# Patient Record
Sex: FEMALE | Race: ASIAN | Employment: OTHER | URBAN - METROPOLITAN AREA
[De-identification: names, ages, dates, MRNs, and addresses within clinical notes are randomized per-mention and may not be internally consistent; named-entity substitution may affect disease eponyms.]

---

## 2018-06-07 ENCOUNTER — TRANSFERRED RECORDS (OUTPATIENT)
Dept: HEALTH INFORMATION MANAGEMENT | Facility: CLINIC | Age: 78
End: 2018-06-07

## 2018-07-17 ENCOUNTER — TRANSFERRED RECORDS (OUTPATIENT)
Dept: HEALTH INFORMATION MANAGEMENT | Facility: CLINIC | Age: 78
End: 2018-07-17

## 2018-08-16 NOTE — PROGRESS NOTES
SUBJECTIVE:   Alena Coronel is a 77 year old female who presents to clinic today for the following health issues:    Patient presents with:  Abdominal Pain: x6 months   Bowel Problems  Health Maintenance: DUE: GAD7, PHQ9, MICROALBUMIN, BMP, FALL RISK, LIPID, DEXA , ADP           Problem list and histories reviewed & adjusted, as indicated.  Additional history: as documented    Patient Active Problem List   Diagnosis     Generalized osteoarthrosis, unspecified site     Irritable bowel syndrome     Adjustment disorder with mixed anxiety and depressed mood     Insomnia     Eczema     Hypertension goal BP (blood pressure) < 140/90     Hyperlipidemia LDL goal <130     Chronic neck pain     Hypokalemia     Osteoporosis     Chest pain     Idiopathic thrombocytopenia (H)     RUQ abdominal pain     Immune to hepatitis A     Dysphagia     GERD (gastroesophageal reflux disease)     Advanced directives, counseling/discussion     Macular drusen     PVD (posterior vitreous detachment)     Venous stasis     Anxiety     Cataract     TMJ (temporomandibular joint syndrome)     Past Surgical History:   Procedure Laterality Date     BIOPSY OF BREAST, INCISIONAL  1985     C APPENDECTOMY  1975     C LIGATE FALLOPIAN TUBE  1975     COLONOSCOPY  11/14/2008       Social History   Substance Use Topics     Smoking status: Never Smoker     Smokeless tobacco: Never Used      Comment: smoke free household.     Alcohol use No      Comment: 1/3 cup of red wine before bedtime     Family History   Problem Relation Age of Onset     GASTROINTESTINAL DISEASE Father      Stomach/Liver Problems     Neurologic Disorder Sister      SANCHEZ     Glaucoma No family hx of      Macular Degeneration No family hx of          Current Outpatient Prescriptions   Medication Sig Dispense Refill     Alendronate Sodium 70 MG TBEF Take by mouth every 7 days       ASMANEX  MCG/ACT inhaler 200 mcg  12     ATORVASTATIN CALCIUM PO Take 20 mg by mouth every morning        Biotin 2500 MCG CAPS Take 5,000 mcg/day by mouth       Calcium Carbonate-Vit D-Min (CALCIUM 1200 PO) Take 1,200 mg by mouth. 1 tab daily       Cholecalciferol (VITAMIN D) 1000 UNITS capsule Take 1 capsule by mouth daily.       CVS OMEGA-3 KRILL  MG CAPS Take  by mouth.       Flaxseed, Linseed, POWD Take 1-2 tsp by mouth daily.       fluocinonide (LIDEX) 0.05 % ointment Apply topically 2 times daily       TRAZODONE HCL PO Take 100 mg by mouth At Bedtime       [DISCONTINUED] OMEGA-3 KRILL OIL PO Take 1 capsule by mouth       Allergies   Allergen Reactions     Penicillins Rash     BP Readings from Last 3 Encounters:   08/21/18 120/70   01/09/14 129/74   08/22/13 132/62    Wt Readings from Last 3 Encounters:   08/21/18 133 lb 9.6 oz (60.6 kg)   01/09/14 136 lb 8 oz (61.9 kg)   08/22/13 136 lb (61.7 kg)                  Labs reviewed in EPIC    Reviewed and updated as needed this visit by clinical staff  Tobacco  Allergies  Meds  Med Hx  Surg Hx  Fam Hx  Soc Hx      Reviewed and updated as needed this visit by Provider         ROS:  This 77 year old female is here today because she has abdomen pain and bloating after eating. She has had 6 pregnancies. Has 5 living adult children. She had colonoscopy 6/7/18 that revealed on adenomatous polyp. Will need another colonoscopy in 5 years. She had esophogram done 7/17/18 which revealed GERD to the level of the mid thoracic esophagus. She was advised to use Maalox, which does help a little. But most of her discomfort is in her abdomen. She finds that there are many foods she can't tolerate eating anymore, such as fried foods.  They just don't sit well in her stomach. All other review of systems are negative  Personal, family, and social history reviewed with patient and revised.         OBJECTIVE:     /70  Pulse 58  Temp 97.6  F (36.4  C) (Oral)  Resp 20  Wt 133 lb 9.6 oz (60.6 kg)  SpO2 99%  BMI 28.91 kg/m2  Body mass index is 28.91  kg/(m^2).  GENERAL: healthy, alert and no distress  NECK: no adenopathy, no asymmetry, masses, or scars and thyroid normal to palpation  RESP: lungs clear to auscultation - no rales, rhonchi or wheezes  CV: regular rate and rhythm, normal S1 S2, no S3 or S4, no murmur, click or rub, no peripheral edema and peripheral pulses strong  ABDOMEN: soft, no hepatosplenomegaly, no masses and bowel sounds normal, she is diffusely tender over her RUQ and her mid abdomen area. Makes me wonder about possible gallstones.   MS: no gross musculoskeletal defects noted, no edema    Diagnostic Test Results:  none     ASSESSMENT/PLAN:              1. Abdominal pain, generalized  As above   - US Abdomen Complete; Future    2. Flatulence, eructation, and gas pain  As above   - US Abdomen Complete; Future    Return to clinic if no improvement     PERLA ASH MD  [facility]

## 2018-08-21 ENCOUNTER — OFFICE VISIT (OUTPATIENT)
Dept: FAMILY MEDICINE | Facility: CLINIC | Age: 78
End: 2018-08-21
Payer: COMMERCIAL

## 2018-08-21 VITALS
WEIGHT: 133.6 LBS | RESPIRATION RATE: 20 BRPM | SYSTOLIC BLOOD PRESSURE: 120 MMHG | BODY MASS INDEX: 28.91 KG/M2 | HEART RATE: 58 BPM | OXYGEN SATURATION: 99 % | DIASTOLIC BLOOD PRESSURE: 70 MMHG | TEMPERATURE: 97.6 F

## 2018-08-21 DIAGNOSIS — R14.2 FLATULENCE, ERUCTATION, AND GAS PAIN: ICD-10-CM

## 2018-08-21 DIAGNOSIS — R14.1 FLATULENCE, ERUCTATION, AND GAS PAIN: ICD-10-CM

## 2018-08-21 DIAGNOSIS — R10.84 ABDOMINAL PAIN, GENERALIZED: Primary | ICD-10-CM

## 2018-08-21 DIAGNOSIS — R14.3 FLATULENCE, ERUCTATION, AND GAS PAIN: ICD-10-CM

## 2018-08-21 PROCEDURE — 99213 OFFICE O/P EST LOW 20 MIN: CPT | Performed by: FAMILY MEDICINE

## 2018-08-21 RX ORDER — MOMETASONE FUROATE 200 UG/1
200 AEROSOL RESPIRATORY (INHALATION)
Refills: 12 | COMMUNITY
Start: 2018-01-18 | End: 2018-11-28

## 2018-08-21 NOTE — PATIENT INSTRUCTIONS
[facility]    If you have any questions regarding to your visit please contact your care team:       Team Purple:   Clinic Hours Telephone Number   Dr. Kristen Hilliard   7am-7pm  Monday - Thursday   7am-5pm  Fridays  [phone]  (Appointment scheduling available 24/7)    Questions about your recent visit?   Team Line:  [phone]   Urgent Care - [place] and [facility] - 11am-9pm Monday-Friday Saturday-Sunday- 9am-5pm   [place] - 5pm-9pm Monday-Friday Saturday-Sunday- 9am-5pm  [phone] - [place]  [phone] [facility]       What options do I have for a visit other than an office visit? We offer electronic visits (e-visits) and telephone visits, when medically appropriate.  Please check with your medical insurance to see if these types of visits are covered, as you will be responsible for any charges that are not paid by your insurance.      You can use [employer] (secure electronic communication) to access to your chart, send your primary care provider a message, or make an appointment. Ask a team member how to get started.     For a price quote for your services, please call our Consumer Price Line at [phone] or our Imaging Cost estimation line at [phone] (for imaging tests).

## 2018-08-21 NOTE — MR AVS SNAPSHOT
After Visit Summary   8/21/2018    Alena Coronel    MRN: [id number]           Patient Information     Date Of Birth          1940        Visit Information        Provider Department      8/21/2018 12:00 PM Kristen Monzon MD [facility]        Today's Diagnoses     Abdominal pain, generalized    -  1    Flatulence, eructation, and gas pain          Care Instructions    [facility]    If you have any questions regarding to your visit please contact your care team:       Team Purple:   Clinic Hours Telephone Number   Dr. Kristen Hilliard   7am-7pm  Monday - Thursday   7am-5pm  Fridays  [phone]  (Appointment scheduling available 24/7)    Questions about your recent visit?   Team Line:  [phone]   Urgent Care - [place] and [facility] - 11am-9pm Monday-Friday Saturday-Sunday- 9am-5pm   [place] - 5pm-9pm Monday-Friday Saturday-Sunday- 9am-5pm  [phone] - [place]  [phone] - [place]       What options do I have for a visit other than an office visit? We offer electronic visits (e-visits) and telephone visits, when medically appropriate.  Please check with your medical insurance to see if these types of visits are covered, as you will be responsible for any charges that are not paid by your insurance.      You can use [email or web address] (secure electronic communication) to access to your chart, send your primary care provider a message, or make an appointment. Ask a team member how to get started.     For a price quote for your services, please call our Consumer Price Line at [phone] or our Imaging Cost estimation line at [phone] (for imaging tests).              Follow-ups after your visit        Your next 10 appointments already scheduled     Sep 20, 2018  8:20 AM CDT   (Arrive by 8:05 AM)   New Patient Visit with Ling Luna MD   [facility] ([facility]    [address]  [address] [place] [address] [phone]              Future tests that were ordered for you today     Open Future Orders        Priority Expected Expires Ordered    US Abdomen Complete Routine  8/21/2019 8/21/2018            Who to contact     If you have questions or need follow up information about today's clinic visit or your schedule please contact [facility] directly at [phone].  Normal or non-critical lab and imaging results will be communicated to you by QuadWranglehart, letter or phone within 4 business days after the clinic has received the results. If you do not hear from us within 7 days, please contact the clinic through MPSTOR or phone. If you have a critical or abnormal lab result, we will notify you by phone as soon as possible.  Submit refill requests through MPSTOR or call your pharmacy and they will forward the refill request to us. Please allow 3 business days for your refill to be completed.          Additional Information About Your Visit        [employer] Information     MPSTOR gives you secure access to your electronic health record. If you see a primary care provider, you can also send messages to your care team and make appointments. If you have questions, please call your primary care clinic.  If you do not have a primary care provider, please call [phone] and they will assist you.        Care EveryWhere ID     This is your Care EveryWhere ID. This could be used by other organizations to access your [facility] medical records  [id number]        Your Vitals Were     Pulse Temperature Respirations Pulse Oximetry BMI (Body Mass Index)       58 97.6  F (36.4  C) (Oral) 20 99% 28.91 kg/m2        Blood Pressure from Last 3 Encounters:   08/21/18 120/70   01/09/14 129/74   08/22/13 132/62    Weight from Last 3 Encounters:   08/21/18 133 lb 9.6 oz (60.6 kg)   01/09/14 136 lb 8 oz (61.9 kg)   08/22/13 136 lb (61.7 kg)                Primary Care Provider Office Phone # Fax #    Kristen Monzon [phone] [address] [address]        Equal Access to Services     ALISTAIR DURAN : Edwina tish stewart ghulamo Sojulyali, waaxda luqadaha, qaybta kaalmada adeegyada, francis carpio laCassiechava huynh. So [facility] [phone].    ATENCIÓN: Si habla español, tiene a robles disposición servicios gratuitos de asistencia lingüística. Llame al [phone].    We comply with applicable federal civil rights laws and [place] laws. We do not discriminate on the basis of race, color, national origin, age, disability, sex, sexual orientation, or gender identity.            Thank you!     Thank you for choosing [facility]  for your care. Our goal is always to provide you with excellent care. Hearing back from our patients is one way we can continue to improve our services. Please take a few minutes to complete the written survey that you may receive in the mail after your visit with us. Thank you!             Your Updated Medication List - Protect others around you: Learn how to safely use, store and throw away your medicines at www.disposemymeds.org.          This list is accurate as of 8/21/18 12:20 PM.  Always use your most recent med list.                   Brand Name Dispense Instructions for use Diagnosis    Alendronate Sodium 70 MG Tbef      Take by mouth every 7 days        ASMANEX  MCG/ACT inhaler   Generic drug:  mometasone furoate      200 mcg        ATORVASTATIN CALCIUM PO      Take 20 mg by mouth every morning        Biotin 2500 MCG Caps      Take 5,000 mcg/day by mouth        CALCIUM 1200 PO      Take 1,200 mg by mouth. 1 tab daily        CVS OMEGA-3 KRILL  MG Caps      Take  by mouth.        Flaxseed (Linseed) Powd      Take 1-2 tsp by mouth daily.        LIDEX 0.05 % ointment   Generic drug:  fluocinonide      Apply topically 2 times daily        TRAZODONE HCL PO      Take  100 mg by mouth At Bedtime        vitamin D 1000 units capsule      Take 1 capsule by mouth daily.

## 2018-08-22 ENCOUNTER — RADIANT APPOINTMENT (OUTPATIENT)
Dept: ULTRASOUND IMAGING | Facility: CLINIC | Age: 78
End: 2018-08-22
Attending: FAMILY MEDICINE
Payer: COMMERCIAL

## 2018-08-22 DIAGNOSIS — R14.2 FLATULENCE, ERUCTATION, AND GAS PAIN: ICD-10-CM

## 2018-08-22 DIAGNOSIS — R14.3 FLATULENCE, ERUCTATION, AND GAS PAIN: ICD-10-CM

## 2018-08-22 DIAGNOSIS — R14.1 FLATULENCE, ERUCTATION, AND GAS PAIN: ICD-10-CM

## 2018-08-22 DIAGNOSIS — R10.84 ABDOMINAL PAIN, GENERALIZED: ICD-10-CM

## 2018-08-22 PROCEDURE — 76700 US EXAM ABDOM COMPLETE: CPT

## 2018-08-23 NOTE — PROGRESS NOTES
Results for orders placed or performed in visit on 08/22/18   US Abdomen Complete    Narrative    ULTRASOUND ABDOMEN COMPLETE  8/22/2018 8:00 AM    HISTORY: Abdominal pain.    COMPARISON: Abdominal ultrasound performed 5/1/2013.    FINDINGS: The liver is unremarkable, without evidence for hepatic mass  or fatty infiltration. The gallbladder is unremarkable, without  evidence of cholelithiasis. Patient is nontender over the gallbladder.  There is mild intra- and extrahepatic biliary dilatation. Common duct  measures up to 1.1 cm in diameter. The pancreatic duct appears mildly  dilated, measuring up to 0.5 cm in diameter where seen. Pancreas is  partially obscured by overlying bowel gas, but appears otherwise  unremarkable where seen. Unremarkable spleen. Small simple cyst in the  upper pole of the right kidney measures 1.6 cm. Both kidneys are  otherwise unremarkable. No hydronephrosis. Abdominal aorta and IVC are  segmentally seen, and are of normal caliber where visualized.      Impression    IMPRESSION:   1. There is mild intra- and extrahepatic biliary dilatation, with no  definite cause identified. Consider CT or MRI for further  characterization.  2. The pancreatic duct also appears mildly dilated at 0.5 cm.   3. Biliary and pancreatic ductal dilatation had a similar appearance  on the prior ultrasound of 5/1/2013.     YULISSA RUIZ MD    will order cat scan of abdomen and pelvis.     PERLA ASH M.D.

## 2018-08-30 ENCOUNTER — RADIANT APPOINTMENT (OUTPATIENT)
Dept: CT IMAGING | Facility: CLINIC | Age: 78
End: 2018-08-30
Attending: FAMILY MEDICINE
Payer: COMMERCIAL

## 2018-08-30 DIAGNOSIS — R14.1 FLATULENCE, ERUCTATION, AND GAS PAIN: ICD-10-CM

## 2018-08-30 DIAGNOSIS — R14.3 FLATULENCE, ERUCTATION, AND GAS PAIN: ICD-10-CM

## 2018-08-30 DIAGNOSIS — R14.2 FLATULENCE, ERUCTATION, AND GAS PAIN: ICD-10-CM

## 2018-08-30 DIAGNOSIS — R10.84 ABDOMINAL PAIN, GENERALIZED: ICD-10-CM

## 2018-08-30 LAB
CREAT BLD-MCNC: 1 MG/DL (ref 0.5–1.2)
GFR SERPL CREATININE-BSD FRML MDRD: 55 ML/MIN/{1.73_M2}
GFRB: 54

## 2018-08-30 PROCEDURE — 74177 CT ABD & PELVIS W/CONTRAST: CPT | Mod: TC

## 2018-08-30 PROCEDURE — 82565 ASSAY OF CREATININE: CPT

## 2018-08-30 RX ORDER — IOPAMIDOL 755 MG/ML
500 INJECTION, SOLUTION INTRAVASCULAR ONCE
Status: COMPLETED | OUTPATIENT
Start: 2018-08-30 | End: 2018-08-30

## 2018-08-30 RX ADMIN — IOPAMIDOL 65 ML: 755 INJECTION, SOLUTION INTRAVASCULAR at 08:08

## 2018-08-30 RX ADMIN — Medication 66 ML: at 08:08

## 2018-09-20 ENCOUNTER — OFFICE VISIT (OUTPATIENT)
Dept: FAMILY MEDICINE | Facility: CLINIC | Age: 78
End: 2018-09-20
Payer: COMMERCIAL

## 2018-09-20 VITALS
TEMPERATURE: 97.5 F | HEART RATE: 58 BPM | OXYGEN SATURATION: 98 % | SYSTOLIC BLOOD PRESSURE: 126 MMHG | WEIGHT: 132.2 LBS | HEIGHT: 58 IN | RESPIRATION RATE: 18 BRPM | DIASTOLIC BLOOD PRESSURE: 70 MMHG | BODY MASS INDEX: 27.75 KG/M2

## 2018-09-20 DIAGNOSIS — L65.9 HAIR LOSS: ICD-10-CM

## 2018-09-20 DIAGNOSIS — I10 HYPERTENSION GOAL BP (BLOOD PRESSURE) < 140/90: Primary | ICD-10-CM

## 2018-09-20 DIAGNOSIS — Z78.0 ASYMPTOMATIC POSTMENOPAUSAL STATUS: ICD-10-CM

## 2018-09-20 DIAGNOSIS — E78.5 HYPERLIPIDEMIA LDL GOAL <130: ICD-10-CM

## 2018-09-20 PROCEDURE — 99214 OFFICE O/P EST MOD 30 MIN: CPT | Performed by: FAMILY MEDICINE

## 2018-09-20 RX ORDER — ASHW RT/MAG BRK/EPMD/THEAN/PHO 200-150 MG
1400 TABLET ORAL DAILY
COMMUNITY
End: 2019-09-19

## 2018-09-20 RX ORDER — AMPICILLIN TRIHYDRATE 250 MG
200 CAPSULE ORAL DAILY
COMMUNITY
End: 2019-09-19

## 2018-09-20 ASSESSMENT — PAIN SCALES - GENERAL: PAINLEVEL: MODERATE PAIN (5)

## 2018-09-20 ASSESSMENT — ANXIETY QUESTIONNAIRES
3. WORRYING TOO MUCH ABOUT DIFFERENT THINGS: SEVERAL DAYS
5. BEING SO RESTLESS THAT IT IS HARD TO SIT STILL: NOT AT ALL
1. FEELING NERVOUS, ANXIOUS, OR ON EDGE: SEVERAL DAYS
7. FEELING AFRAID AS IF SOMETHING AWFUL MIGHT HAPPEN: SEVERAL DAYS
6. BECOMING EASILY ANNOYED OR IRRITABLE: SEVERAL DAYS
2. NOT BEING ABLE TO STOP OR CONTROL WORRYING: SEVERAL DAYS
GAD7 TOTAL SCORE: 5
IF YOU CHECKED OFF ANY PROBLEMS ON THIS QUESTIONNAIRE, HOW DIFFICULT HAVE THESE PROBLEMS MADE IT FOR YOU TO DO YOUR WORK, TAKE CARE OF THINGS AT HOME, OR GET ALONG WITH OTHER PEOPLE: SOMEWHAT DIFFICULT

## 2018-09-20 ASSESSMENT — PATIENT HEALTH QUESTIONNAIRE - PHQ9: 5. POOR APPETITE OR OVEREATING: NOT AT ALL

## 2018-09-20 NOTE — MR AVS SNAPSHOT
After Visit Summary   9/20/2018    Alena Coronel    MRN: [id number]           Patient Information     Date Of Birth          1940        Visit Information        Provider Department      9/20/2018 1:45 PM Kristen Monzon MD [facility]        Today's Diagnoses     Hypertension goal BP (blood pressure) < 140/90    -  1    Hyperlipidemia LDL goal <130        Asymptomatic postmenopausal status        Hair loss          Care Instructions    [facility]    If you have any questions regarding to your visit please contact your care team:       Team Purple:   Clinic Hours Telephone Number   Dr. Kristen Hilliard   7am-7pm  Monday - Thursday   7am-5pm  Fridays  [phone]  (Appointment scheduling available 24/7)   Urgent Care - [place] - 11am-9pm Monday-Friday Saturday-Sunday- 9am-5pm   [place] - 5pm-9pm Monday-Friday Saturday-Sunday- 9am-5pm  [phone] - [place]  [phone] - [place]       What options do I have for a visit other than an office visit? We offer electronic visits (e-visits) and telephone visits, when medically appropriate.  Please check with your medical insurance to see if these types of visits are covered, as you will be responsible for any charges that are not paid by your insurance.      You can use Iddiction (secure electronic communication) to access to your chart, send your primary care provider a message, or make an appointment. Ask a team member how to get started.     For a price quote for your services, please call our Consumer Price Line at [phone] or our Imaging Cost estimation line at [phone] (for imaging tests).    [facility]    If you have any questions regarding to your visit please contact your care team:       Team Purple:   Clinic Hours Telephone Number   Dr. Kristen Hilliard   7am-7pm   Monday - Thursday   7am-5pm  Fridays  [phone]  (Appointment scheduling available 24/7)   Urgent Care - [place] and [place] - 11am-9pm Monday-Friday Saturday-Sunday- 9am-5pm   [place] - 5pm-9pm Monday-Friday Saturday-Sunday- 9am-5pm  [phone] - [place]  [phone] - [place]       What options do I have for a visit other than an office visit? We offer electronic visits (e-visits) and telephone visits, when medically appropriate.  Please check with your medical insurance to see if these types of visits are covered, as you will be responsible for any charges that are not paid by your insurance.      You can use Actimize (secure electronic communication) to access to your chart, send your primary care provider a message, or make an appointment. Ask a team member how to get started.     For a price quote for your services, please call our Consumer Price Line at [phone] or our Imaging Cost estimation line at [phone] (for imaging tests).      CHRISTIANNE Coley            Follow-ups after your visit        Future tests that were ordered for you today     Open Future Orders        Priority Expected Expires Ordered    TSH with free T4 reflex Routine 11/20/2018 9/20/2019 9/20/2018    DEXA HIP/PELVIS/SPINE - Future Routine  11/20/2018 9/20/2018    BASIC METABOLIC PANEL Routine  11/20/2018 9/20/2018    Lipid panel reflex to direct LDL Fasting Routine  11/20/2018 9/20/2018            Who to contact     If you have questions or need follow up information about today's clinic visit or your schedule please contact [facility] directly at [phone].  Normal or non-critical lab and imaging results will be communicated to you by MyChart, letter or phone within 4 business days after the clinic has received the results. If you do not hear from us within 7 days, please contact the clinic through HunterOnhart or phone. If you have a critical or abnormal lab result, we will notify you  "by phone as soon as possible.  Submit refill requests through [employer] or call your pharmacy and they will forward the refill request to us. Please allow 3 business days for your refill to be completed.          Additional Information About Your Visit        Digital Payment Technologieshart Information     [employer] gives you secure access to your electronic health record. If you see a primary care provider, you can also send messages to your care team and make appointments. If you have questions, please call your primary care clinic.  If you do not have a primary care provider, please call [phone] and they will assist you.        Care EveryWhere ID     This is your Care EveryWhere ID. This could be used by other organizations to access your [place] medical records  [id number]        Your Vitals Were     Pulse Temperature Respirations Height Pulse Oximetry BMI (Body Mass Index)    58 97.5  F (36.4  C) (Oral) 18 4' 10.47\" (1.485 m) 98% 27.19 kg/m2       Blood Pressure from Last 3 Encounters:   09/20/18 126/70   08/21/18 120/70   01/09/14 129/74    Weight from Last 3 Encounters:   09/20/18 132 lb 3.2 oz (60 kg)   08/21/18 133 lb 9.6 oz (60.6 kg)   01/09/14 136 lb 8 oz (61.9 kg)               Primary Care Provider Office Phone # Fax #    Kristen Monzon [phone] [address] [address]        Equal Access to Services     [facility] ROGER AH: Hadii tish ku hadasho Soomaali, waaxda luqadaha, qaybta kaalmada ortega, francis mckoy . So [facility] [phone].    ATENCIÓN: Si habla edel, tiene a robels disposición servicios gratuitos de asistencia lingüística. Isela al [phone].    We comply with applicable federal civil rights laws and [place] laws. We do not discriminate on the basis of race, color, national origin, age, disability, sex, sexual orientation, or gender identity.            Thank you!     Thank you for choosing [facility]  for your care. " Our goal is always to provide you with excellent care. Hearing back from our patients is one way we can continue to improve our services. Please take a few minutes to complete the written survey that you may receive in the mail after your visit with us. Thank you!             Your Updated Medication List - Protect others around you: Learn how to safely use, store and throw away your medicines at www.disposemymeds.org.          This list is accurate as of 9/20/18  2:24 PM.  Always use your most recent med list.                   Brand Name Dispense Instructions for use Diagnosis    Alendronate Sodium 70 MG Tbef      Take by mouth every 7 days        ASMANEX  MCG/ACT inhaler   Generic drug:  mometasone furoate      200 mcg        ATORVASTATIN CALCIUM PO      Take 20 mg by mouth every morning        Biotin 2500 MCG Caps      Take 5,000 mcg/day by mouth        CALCIUM 1200 PO      Take 1,200 mg by mouth. 1 tab daily        CoQ10 200 MG Caps      Take 200 mg by mouth daily        CVS NATURAL FISH OIL 1000 MG Caps      Take 1,400 mg by mouth daily        CVS OMEGA-3 KRILL  MG Caps      Take  by mouth.        Flaxseed (Linseed) Powd      Take 1-2 tsp by mouth daily.        LIDEX 0.05 % ointment   Generic drug:  fluocinonide      Apply topically 2 times daily        TRAZODONE HCL PO      Take 100 mg by mouth At Bedtime        vitamin D 1000 units capsule      Take 1 capsule by mouth daily.

## 2018-09-20 NOTE — PATIENT INSTRUCTIONS
[facility]    If you have any questions regarding to your visit please contact your care team:       Team Purple:   Clinic Hours Telephone Number   Dr. Kristen Hilliard   7am-7pm  Monday - Thursday 7am-5pm  Fridays  [phone]  (Appointment scheduling available 24/7)   Urgent Care - [place] - 11am-9pm Monday-Friday Saturday-Sunday- 9am-5pm   [place] - 5pm-9pm Monday-Friday Saturday-Sunday- 9am-5pm  [phone] - [place]  [phone] - [place]       What options do I have for a visit other than an office visit? We offer electronic visits (e-visits) and telephone visits, when medically appropriate.  Please check with your medical insurance to see if these types of visits are covered, as you will be responsible for any charges that are not paid by your insurance.      You can use [employer] (secure electronic communication) to access to your chart, send your primary care provider a message, or make an appointment. Ask a team member how to get started.     For a price quote for your services, please call our Consumer Price Line at [phone] or our Imaging Cost estimation line at [phone] (for imaging tests).    [facility]    If you have any questions regarding to your visit please contact your care team:       Team Purple:   Clinic Hours Telephone Number   Dr. Kristen Hilliard   7am-7pm  Monday - Thursday   7am-5pm  Fridays  [phone]  (Appointment scheduling available 24/7)   Urgent Care - [place] - 11am-9pm Monday-Friday Saturday-Sunday- 9am-5pm   [place] - 5pm-9pm Monday-Friday Saturday-Sunday- 9am-5pm  [phone] - [place]  [phone] - [place]       What options do I have for a visit other than an office visit? We offer electronic visits (e-visits) and telephone visits, when medically appropriate.  Please  check with your medical insurance to see if these types of visits are covered, as you will be responsible for any charges that are not paid by your insurance.      You can use VastPark (secure electronic communication) to access to your chart, send your primary care provider a message, or make an appointment. Ask a team member how to get started.     For a price quote for your services, please call our Consumer Price Line at [phone] or our Imaging Cost estimation line at [phone] (for imaging tests).      CHRISTIANNE Coley

## 2018-09-21 ENCOUNTER — RADIANT APPOINTMENT (OUTPATIENT)
Dept: BONE DENSITY | Facility: CLINIC | Age: 78
End: 2018-09-21
Attending: FAMILY MEDICINE
Payer: COMMERCIAL

## 2018-09-21 DIAGNOSIS — L65.9 HAIR LOSS: ICD-10-CM

## 2018-09-21 DIAGNOSIS — I10 HYPERTENSION GOAL BP (BLOOD PRESSURE) < 140/90: ICD-10-CM

## 2018-09-21 DIAGNOSIS — E78.5 HYPERLIPIDEMIA LDL GOAL <130: ICD-10-CM

## 2018-09-21 DIAGNOSIS — Z78.0 ASYMPTOMATIC POSTMENOPAUSAL STATUS: ICD-10-CM

## 2018-09-21 LAB
ANION GAP SERPL CALCULATED.3IONS-SCNC: 8 MMOL/L (ref 3–14)
BUN SERPL-MCNC: 11 MG/DL (ref 7–30)
CALCIUM SERPL-MCNC: 8.8 MG/DL (ref 8.5–10.1)
CHLORIDE SERPL-SCNC: 107 MMOL/L (ref 94–109)
CHOLEST SERPL-MCNC: 144 MG/DL
CO2 SERPL-SCNC: 25 MMOL/L (ref 20–32)
CREAT SERPL-MCNC: 0.87 MG/DL (ref 0.52–1.04)
GFR SERPL CREATININE-BSD FRML MDRD: 63 ML/MIN/1.7M2
GLUCOSE SERPL-MCNC: 88 MG/DL (ref 70–99)
HDLC SERPL-MCNC: 47 MG/DL
LDLC SERPL CALC-MCNC: 77 MG/DL
NONHDLC SERPL-MCNC: 97 MG/DL
POTASSIUM SERPL-SCNC: 3.9 MMOL/L (ref 3.4–5.3)
SODIUM SERPL-SCNC: 140 MMOL/L (ref 133–144)
TRIGL SERPL-MCNC: 98 MG/DL
TSH SERPL DL<=0.005 MIU/L-ACNC: 0.8 MU/L (ref 0.4–4)

## 2018-09-21 PROCEDURE — 77085 DXA BONE DENSITY AXL VRT FX: CPT | Performed by: INTERNAL MEDICINE

## 2018-09-21 PROCEDURE — 84443 ASSAY THYROID STIM HORMONE: CPT | Performed by: FAMILY MEDICINE

## 2018-09-21 PROCEDURE — 36415 COLL VENOUS BLD VENIPUNCTURE: CPT | Performed by: FAMILY MEDICINE

## 2018-09-21 PROCEDURE — 80061 LIPID PANEL: CPT | Performed by: FAMILY MEDICINE

## 2018-09-21 PROCEDURE — 80048 BASIC METABOLIC PNL TOTAL CA: CPT | Performed by: FAMILY MEDICINE

## 2018-09-21 ASSESSMENT — ANXIETY QUESTIONNAIRES: GAD7 TOTAL SCORE: 5

## 2018-09-21 ASSESSMENT — PATIENT HEALTH QUESTIONNAIRE - PHQ9: SUM OF ALL RESPONSES TO PHQ QUESTIONS 1-9: 12

## 2018-10-29 ENCOUNTER — OFFICE VISIT (OUTPATIENT)
Dept: INTERNAL MEDICINE | Facility: CLINIC | Age: 78
End: 2018-10-29
Payer: COMMERCIAL

## 2018-10-29 VITALS
TEMPERATURE: 97.9 F | SYSTOLIC BLOOD PRESSURE: 128 MMHG | HEART RATE: 62 BPM | DIASTOLIC BLOOD PRESSURE: 74 MMHG | OXYGEN SATURATION: 98 % | RESPIRATION RATE: 20 BRPM | WEIGHT: 135.2 LBS | BODY MASS INDEX: 27.81 KG/M2

## 2018-10-29 DIAGNOSIS — F43.22 ADJUSTMENT DISORDER WITH ANXIOUS MOOD: ICD-10-CM

## 2018-10-29 DIAGNOSIS — E55.9 VITAMIN D DEFICIENCY: ICD-10-CM

## 2018-10-29 DIAGNOSIS — L29.9 CHRONIC PRURITUS: ICD-10-CM

## 2018-10-29 DIAGNOSIS — G47.09 OTHER INSOMNIA: ICD-10-CM

## 2018-10-29 DIAGNOSIS — L30.9 ECZEMA, UNSPECIFIED TYPE: Primary | ICD-10-CM

## 2018-10-29 RX ORDER — TRIAMCINOLONE ACETONIDE 5 MG/G
OINTMENT TOPICAL
Qty: 30 G | Refills: 0 | Status: SHIPPED | OUTPATIENT
Start: 2018-10-29

## 2018-10-29 ASSESSMENT — ENCOUNTER SYMPTOMS
FATIGUE: 1
RECTAL PAIN: 0
DISTURBANCES IN COORDINATION: 0
NAUSEA: 0
WHEEZING: 1
POSTURAL DYSPNEA: 0
HEADACHES: 0
PARALYSIS: 0
EYE PAIN: 0
POLYPHAGIA: 0
NIGHT SWEATS: 0
HEMATURIA: 0
MEMORY LOSS: 1
NAIL CHANGES: 0
HEMOPTYSIS: 0
LOSS OF CONSCIOUSNESS: 0
DYSURIA: 0
FEVER: 1
COUGH: 1
HALLUCINATIONS: 0
SEIZURES: 0
NECK PAIN: 1
ARTHRALGIAS: 1
ABDOMINAL PAIN: 1
POLYDIPSIA: 0
SHORTNESS OF BREATH: 1
POOR WOUND HEALING: 0
BLOOD IN STOOL: 0
NUMBNESS: 1
SPUTUM PRODUCTION: 1
MYALGIAS: 0
SPEECH CHANGE: 0
VOMITING: 0
BACK PAIN: 1
CONSTIPATION: 1
EYE WATERING: 1
DOUBLE VISION: 0
HEARTBURN: 1
DIZZINESS: 0
TINGLING: 1
ALTERED TEMPERATURE REGULATION: 0
COUGH DISTURBING SLEEP: 1
DIARRHEA: 0
SKIN CHANGES: 1

## 2018-10-29 ASSESSMENT — PAIN SCALES - GENERAL: PAINLEVEL: SEVERE PAIN (7)

## 2018-10-29 NOTE — MR AVS SNAPSHOT
After Visit Summary   10/29/2018    Alena Coronel    MRN: [id number]           Patient Information     Date Of Birth          1940        Visit Information        Provider Department      10/29/2018 2:40 PM Ling Luna MD [facility]        Today's Diagnoses     Eczema, unspecified type    -  1    Chronic pruritus        Vitamin D deficiency        Adjustment disorder with anxious mood        Other insomnia          Care Instructions    Primary Care Center Medication Refill Request Information:  * Please contact your pharmacy regarding ANY request for medication refills.  ** [facility] Prescription Fax = [phone]  * Please allow 3 business days for routine medication refills.  * Please allow 5 business days for controlled substance medication refills.     Primary Care Center Test Result notification information:  *You will be notified with in 7-10 days of your appointment day regarding the results of your test.  If you are on MyChart you will be notified as soon as the provider has reviewed the results and signed off on them.    [facility]: [phone]       Insomnia  Insomnia is repeated difficulty going to sleep or staying asleep, or both. Whether you have insomnia is not defined by a specific amount of sleep. Different people need different amounts of sleep, and you may need more or less sleep at different times of your life.  There are 3 major types of insomnia:  short-term, chronic, and  other.   Short-term, or acute insomnia lasts less than 3 months.  The symptoms are temporary and can be linked directly to a stressor, such as the death of a loved one, financial problems, or a new physical problem.  Short-term insomnia stops when the stressor resolves or the person adapts to its presence.  Chronic insomnia occurs at least 3 times a week and lasts longer than 3 months.  Chronic insomnia can occur when either the cause of the sleeping problem is not clear, or  the insomnia does not get better when the stressor is resolved. A number of other criteria are also used to make the diagnosis of chronic insomnia.    Other insomnia  is the third type of insomnia-related sleep disorders.  This description applies to people who have problems getting to sleep or staying asleep, but do not meet all of the factors that describe either short-term or chronic insomnia.    Many things cause insomnia. Different people may have different causes. It can be from an underlying medical or psychological condition, or lifestyle. It can also be primary insomnia, which means no cause can be found.  Causes of insomnia include:    Chronic medical problems- heart disease, gastrointestinal problems, hormonal changes, breathing problems    Anxiety    Stress    Depression    Pain    Work schedule    Sleep apnea    Illegal drugs    Certain medicines  Many different medidcines can affect your sleep, such as stimulants, caffeine, alcohol, some decongestants, and diet pills. Other medicines may include some types of blood pressure pills, steroids, asthma medicines, antihistamines, antidepressants, seizure medicines and statins. Not all of these will affect your sleep, and they shouldn t be stopped without talking to your doctor.  Symptoms of insomnia can include:    Lying awake for long periods at night before falling asleep    Waking up several times during the night    Waking up early in the morning and not being able to get back to sleep    Feeling tired and not refreshed by sleep    Not being able to function properly during the day and finding it hard to concentrate    Irritability    Tiredness and fatigue during the day  Home care    Review your medicines with your doctor or pharmacist to find out if they can cause insomnia. Not all medicines will affect your sleep, but they shouldn't be stopped without reviewing them with your doctor. There may be serious side effects and consequences from suddenly  stopping your medicines. Not taking them may cause strokes, heart attacks, and many other problems.    Caffeine, smoking and alcohol also affect sleep. Limit your daily use and do not use these before bedtime. Alcohol may make you sleepy at first, but as its effects wear off, you may awaken a few hours later and have trouble returning to sleep.    Do not exercise, eat or drink large amounts of liquid within 2 hours of your bedtime.    Improve your sleep habits. Have a fixed bed and wake-up time. Try to keep noise, light and heat in your bedroom at a comfortable level. Try using earplugs or eyeshades if needed.     Avoid watching TV in bed.    If you do not fall asleep within 30 minutes, try to relax by reading or listening to soft music.    Limit daytime napping to one 30 minute period, early in the day.    Get regular exercise. Find other ways to lessen your stress level.    If a medicine was prescribed to help reset your sleep patterns, take it as directed. Sleeping pills are intended for short-term use, only. If taken for too long, the effect wears off while the risk of physical addiction and psychological dependence increases.  Sleep diary  If the cause isn t obvious and it is not improving, try keeping a  sleep diary  for a couple of weeks. Include in it:    The time you go to bed    How long it takes to fall asleep    How many times you wake up    What time you wake up    Your meal times and what you eat    What time you drink alcohol    Your exercise habits and times  Follow-up care  Follow up with your healthcare provider, or as advised. If X-rays or CT scans were done, you will be notified if there is a change in the reading, especially if it affects treatment.  Call 911  Call 911 if any of these occur:    Trouble breathing    Confusion or trouble waking    Fainting or loss of consciousness    Rapid heart rate    New chest, arm, shoulder, neck or upper back pain    Trouble with speech or vision, weakness of  an arm or leg    Trouble walking or talking, loss of balance, numbness or weakness in one side of your body, facial droop  When to seek medical advice  Call your healthcare provider right away if any of these occur:    Extreme restlessness or irritability    Confusion or hallucinations (seeing or hearing things that are not there)    Anxiety, depression    Several days without sleeping  Date Last Reviewed: 11/19/2015 2000-2017 [employer]. [address] [place] [address], [place] [address]. All rights reserved. This information is not intended as a substitute for professional medical care. Always follow your healthcare professional's instructions.    Health Psychology (Dr. Jace Watts) [phone] ([facility] [place], 3rd Floor S, D&T)   Health Psychology (Dr. Aide Moore) [phone]   Health Psychology (Dr. Bashir Beltran) [phone]   Health Psychology (Dr. Rtia King) [phone]   Health Psychology (Dr. Ingris May) [phone]                  Follow-ups after your visit        Additional Services     PSYCHOLOGY ([facility] HEALTH PSYCHOLOGY) REFERRAL       Your provider has referred you to:  [facility]    Please be aware that coverage of these services is subject to the terms and limitations of your health insurance plan.  Call member services at your health plan with any benefit or coverage questions.      Please bring the following to your appointment:    >>   Any x-rays, CTs or MRIs which have been performed.  Contact the facility where they were done to arrange for  prior to your scheduled appointment.   >>   List of current medications   >>   This referral request   >>   Any documents/labs given to you for this referral                  Follow-up notes from your care team     Return in about 4 weeks (around 11/26/2018) for Physical Exam.      Future tests that were ordered for you today     Open Future Orders        Priority Expected Expires Ordered    Vitamin D Deficiency Routine  10/29/2018 10/29/2019 10/29/2018            Who to contact     Please call your clinic at [phone] to:    Ask questions about your health    Make or cancel appointments    Discuss your medicines    Learn about your test results    Speak to your doctor            Additional Information About Your Visit        MyChart Information     [employer] gives you secure access to your electronic health record. If you see a primary care provider, you can also send messages to your care team and make appointments. If you have questions, please call your primary care clinic.  If you do not have a primary care provider, please call [phone] and they will assist you.      [employer] is an electronic gateway that provides easy, online access to your medical records. With [employer], you can request a clinic appointment, read your test results, renew a prescription or communicate with your care team.     To access your existing account, please contact your [facility] or call [phone] for assistance.        Care EveryWhere ID     This is your Care EveryWhere ID. This could be used by other organizations to access your [facility] medical records  [id number]        Your Vitals Were     Pulse Temperature Respirations Pulse Oximetry BMI (Body Mass Index)       62 97.9  F (36.6  C) (Oral) 20 98% 27.81 kg/m2        Blood Pressure from Last 3 Encounters:   10/29/18 128/74   09/20/18 126/70   08/21/18 120/70    Weight from Last 3 Encounters:   10/29/18 61.3 kg (135 lb 3.2 oz)   09/20/18 60 kg (132 lb 3.2 oz)   08/21/18 60.6 kg (133 lb 9.6 oz)              We Performed the Following     PSYCHOLOGY ([facility] HEALTH PSYCHOLOGY) REFERRAL          Today's Medication Changes          These changes are accurate as of 10/29/18  3:40 PM.  If you have any questions, ask your nurse or doctor.               Start taking these medicines.        Dose/Directions    triamcinolone 0.5 % Oint ointment   Commonly known as:  KENALOG    Used for:  Eczema, unspecified type   Started by:  Ling Luna MD        Apply to affected area twice a day, when rash is active   Quantity:  30 g   Refills:  0            Where to get your medicines      These medications were sent to [facility] - [place], [place] - [address] AT [facility] [address]  [address] [facility] [address]     Phone:  [phone]     triamcinolone 0.5 % Oint ointment                Primary Care Provider Office Phone # Fax #    Kristen Monzon [phone] [address] [facility] [address]        Equal Access to Services     ALISTAIR DURAN : Hadii tish parmaro Sola, waaxda luqadaha, qaybta kaalmada adeegyada, francis huynh. So [facility] [phone].    ATENCIÓN: Si habla español, tiene a robles disposición servicios gratuitos de asistencia lingüística. [facility] [phone].    We comply with applicable federal civil rights laws and [place] laws. We do not discriminate on the basis of race, color, national origin, age, disability, sex, sexual orientation, or gender identity.            Thank you!     Thank you for choosing [facility]  for your care. Our goal is always to provide you with excellent care. Hearing back from our patients is one way we can continue to improve our services. Please take a few minutes to complete the written survey that you may receive in the mail after your visit with us. Thank you!             Your Updated Medication List - Protect others around you: Learn how to safely use, store and throw away your medicines at www.disposemymeds.org.          This list is accurate as of 10/29/18  3:40 PM.  Always use your most recent med list.                   Brand Name Dispense Instructions for use Diagnosis    Alendronate Sodium 70 MG Tbef      Take by mouth every 7 days        ASMANEX  MCG/ACT inhaler   Generic drug:  mometasone  furoate      200 mcg        ATORVASTATIN CALCIUM PO      Take 20 mg by mouth every morning        Biotin 2500 MCG Caps      Take 5,000 mcg/day by mouth        CALCIUM 1200 PO      Take 1,000 Units by mouth 1 tab daily        CoQ10 200 MG Caps      Take 200 mg by mouth daily        [employer] NATURAL FISH OIL 1000 MG Caps      Take 1,400 mg by mouth daily        [employer]S OMEGA-3 KRILL  MG Caps      Take  by mouth.        Flaxseed (Linseed) Powd      Take 1-2 tsp by mouth daily.        LIDEX 0.05 % ointment   Generic drug:  fluocinonide      Apply topically 2 times daily        TRAZODONE HCL PO      Take 100 mg by mouth At Bedtime        triamcinolone 0.5 % Oint ointment    KENALOG    30 g    Apply to affected area twice a day, when rash is active    Eczema, unspecified type       TYLENOL PO      Take 325 mg by mouth every 4 hours as needed for mild pain or fever        VENTOLIN IN      2-4 puffs every 4-6 hours as needed.Shannon Dao LPN 3:39 PM on 10/29/2018        vitamin D 1000 units capsule      Take 5,000 Units by mouth daily

## 2018-10-29 NOTE — PATIENT INSTRUCTIONS
[facility] Medication Refill Request Information:  * Please contact your pharmacy regarding ANY request for medication refills.  ** [facility] Prescription Fax = [phone]  * Please allow 3 business days for routine medication refills.  * Please allow 5 business days for controlled substance medication refills.     [facility] Test Result notification information:  *You will be notified with in 7-10 days of your appointment day regarding the results of your test.  If you are on MyChart you will be notified as soon as the provider has reviewed the results and signed off on them.    [facility]: [phone]       Insomnia  Insomnia is repeated difficulty going to sleep or staying asleep, or both. Whether you have insomnia is not defined by a specific amount of sleep. Different people need different amounts of sleep, and you may need more or less sleep at different times of your life.  There are 3 major types of insomnia:  short-term, chronic, and  other.   Short-term, or acute insomnia lasts less than 3 months.  The symptoms are temporary and can be linked directly to a stressor, such as the death of a loved one, financial problems, or a new physical problem.  Short-term insomnia stops when the stressor resolves or the person adapts to its presence.  Chronic insomnia occurs at least 3 times a week and lasts longer than 3 months.  Chronic insomnia can occur when either the cause of the sleeping problem is not clear, or the insomnia does not get better when the stressor is resolved. A number of other criteria are also used to make the diagnosis of chronic insomnia.    Other insomnia  is the third type of insomnia-related sleep disorders.  This description applies to people who have problems getting to sleep or staying asleep, but do not meet all of the factors that describe either short-term or chronic insomnia.    Many things cause insomnia. Different people may have different causes. It can be  from an underlying medical or psychological condition, or lifestyle. It can also be primary insomnia, which means no cause can be found.  Causes of insomnia include:    Chronic medical problems- heart disease, gastrointestinal problems, hormonal changes, breathing problems    Anxiety    Stress    Depression    Pain    Work schedule    Sleep apnea    Illegal drugs    Certain medicines  Many different medidcines can affect your sleep, such as stimulants, caffeine, alcohol, some decongestants, and diet pills. Other medicines may include some types of blood pressure pills, steroids, asthma medicines, antihistamines, antidepressants, seizure medicines and statins. Not all of these will affect your sleep, and they shouldn t be stopped without talking to your doctor.  Symptoms of insomnia can include:    Lying awake for long periods at night before falling asleep    Waking up several times during the night    Waking up early in the morning and not being able to get back to sleep    Feeling tired and not refreshed by sleep    Not being able to function properly during the day and finding it hard to concentrate    Irritability    Tiredness and fatigue during the day  Home care    Review your medicines with your doctor or pharmacist to find out if they can cause insomnia. Not all medicines will affect your sleep, but they shouldn't be stopped without reviewing them with your doctor. There may be serious side effects and consequences from suddenly stopping your medicines. Not taking them may cause strokes, heart attacks, and many other problems.    Caffeine, smoking and alcohol also affect sleep. Limit your daily use and do not use these before bedtime. Alcohol may make you sleepy at first, but as its effects wear off, you may awaken a few hours later and have trouble returning to sleep.    Do not exercise, eat or drink large amounts of liquid within 2 hours of your bedtime.    Improve your sleep habits. Have a fixed bed and  wake-up time. Try to keep noise, light and heat in your bedroom at a comfortable level. Try using earplugs or eyeshades if needed.     Avoid watching TV in bed.    If you do not fall asleep within 30 minutes, try to relax by reading or listening to soft music.    Limit daytime napping to one 30 minute period, early in the day.    Get regular exercise. Find other ways to lessen your stress level.    If a medicine was prescribed to help reset your sleep patterns, take it as directed. Sleeping pills are intended for short-term use, only. If taken for too long, the effect wears off while the risk of physical addiction and psychological dependence increases.  Sleep diary  If the cause isn t obvious and it is not improving, try keeping a  sleep diary  for a couple of weeks. Include in it:    The time you go to bed    How long it takes to fall asleep    How many times you wake up    What time you wake up    Your meal times and what you eat    What time you drink alcohol    Your exercise habits and times  Follow-up care  Follow up with your healthcare provider, or as advised. If X-rays or CT scans were done, you will be notified if there is a change in the reading, especially if it affects treatment.  Call 911  Call 911 if any of these occur:    Trouble breathing    Confusion or trouble waking    Fainting or loss of consciousness    Rapid heart rate    New chest, arm, shoulder, neck or upper back pain    Trouble with speech or vision, weakness of an arm or leg    Trouble walking or talking, loss of balance, numbness or weakness in one side of your body, facial droop  When to seek medical advice  Call your healthcare provider right away if any of these occur:    Extreme restlessness or irritability    Confusion or hallucinations (seeing or hearing things that are not there)    Anxiety, depression    Several days without sleeping  Date Last Reviewed: 11/19/2015 2000-2017 [employer]. [address] [facility],  [place] [address]. All rights reserved. This information is not intended as a substitute for professional medical care. Always follow your healthcare professional's instructions.    Health Psychology (Dr. Jace Watts) [phone] ([facility] [place], 3rd Floor S, D&T)   Health Psychology (Dr. Aide Moore) [phone]   Health Psychology (Dr. Bashir Beltran) [phone]   Health Psychology (Dr. Rita King) [phone]   Health Psychology (Dr. Ingris May) [phone]

## 2018-10-29 NOTE — PROGRESS NOTES
[facility]  Primary Care Center   Ling Luna MD  10/29/2018      Chief Complaint:   Establish Care and Tingling       History of Present Illness:   Alena Coronel is a 78 year old female with a history of hypothyroidism, hyperlipidemia, GERD, and insomnia  who presents alone for evaluation of tingling and to establish care. She was previously seen at [facility] in [place], [place]. She has some paperwork with her today containing some of her medical records. Today we reviewed her entire past medical history and updated her EHR accordingly.    Bones: Her bone density T-score in 2008 was -2.5, indicative of osteoporosis. Her most recent dexa scan was completed on 9/21 which showed osteopenia at the lumbar spine (-2.2). She has been exercising on the treadmill in addition to biking to improve her bone density. She notes she has been eating lots of spinach and kale as well. She does not drink lots of milk because it upsets her stomach. She is taking 1200 mg of calcium daily and 6000 units of Vitamin D3 daily. We discussed how to interpret T-scores in detail.    Anxiety: When she lays down to sleep at night, she will begin to doze off and then wake up suddenly. She also notes having stress and anxiety over caring for her  who has Parkinson's disease. She is treating this by taking 100 mg Trazodone but notes this is not totally effective for managing her sleep. She is open to meeting with a health psychologist to discuss this.     Eczema: She gets intermittent flares of eczema and usually uses a gel to help alleviate her itching. She has not tried to use ointment to treat this in the past.    Arthritis: She reports feels very stiff in her neck when she wakes up in the morning which sometimes extends up to her head. She denies headaches associated with this. She also has very bad arthritis in her hands.     Pain: She notes having lots of aching in her back. She describes this as a throbbing pain which  sometimes radiates down her legs to her feet.     Heart palpitations: She notes she has experienced heart palpitations in the past during the night while she sleeps. She reports that she is no longer experiencing this.     Stomach: feels like a squeezing inside when she is eating sticky food such as sticky rice. She notes the food seems to get stuck in her throat. She reports that she is unable to eat certain foods such as pickles, vegetables, and some meats such as chicken and beef.       Other concerns discussed:  1. Reviewed blood test results   2. Mammogram - outstanding   3. Frequent cough   4. Hair loss - has been thinning over past 10 years      Review of Systems:   Pertinent items are noted in HPI or as in patient entered ROS below, remainder of complete ROS is negative.  Answers for HPI/ROS submitted by the patient on 10/29/2018   General Symptoms: Yes  Skin Symptoms: Yes  HENT Symptoms: No  EYE SYMPTOMS: Yes  HEART SYMPTOMS: No  LUNG SYMPTOMS: Yes  INTESTINAL SYMPTOMS: Yes  URINARY SYMPTOMS: Yes  GYNECOLOGIC SYMPTOMS: No  BREAST SYMPTOMS: No  SKELETAL SYMPTOMS: Yes  BLOOD SYMPTOMS: No  NERVOUS SYSTEM SYMPTOMS: Yes  MENTAL HEALTH SYMPTOMS: Yes  Fever: Yes  Fatigue: Yes  Night sweats: No  Excessive hunger: No  Excessive thirst: No  Feeling hot or cold when others believe the temperature is normal: No  Loss of height: No  Surgical site pain: No  Hallucinations: No  Changes in hair: Yes  Changes in moles/birth marks: Yes  Itching: Yes  Rashes: Yes  Changes in nails: No  Acne: Yes  Hair in places you don't want it: No  Warts: No  Non-healing sores: No  Scarring: No  Flaking of skin: No  Color changes of hands/feet in cold : No  Sun sensitivity: No  Skin thickening: Yes  Eye pain: No  Vision loss: No  Dry eyes: Yes  Watery eyes: Yes  Double vision: No  Spots: No  Cough: Yes  Sputum or phlegm: Yes  Coughing up blood: No  Difficulty breating or shortness of breath: Yes  Wheezing: Yes  Nighttime Cough:  Yes  Difficulty breathing when lying flat: No  Heart burn or indigestion: Yes  Nausea: No  Vomiting: No  Abdominal pain: Yes  Constipation: Yes  Diarrhea: No  Blood in stool: No  Black stools: No  Rectal or Anal pain: No  Vomit with blood: No  Change in stools: No  Trouble holding urine or incontinence: Yes  Pain or burning: No  Increased frequency of urination: Yes  Blood in urine: No  Decreased frequency of urination: No  Back pain: Yes  Muscle aches: No  Neck pain: Yes  Joint pain: Yes  Trouble with coordination: No  Dizziness or trouble with balance: No  Fainting or black-out spells: No  Memory loss: Yes  Headache: No  Seizures: No  Speech problems: No  Tingling: Yes  Paralysis: No  Numbness: Yes      Active Medications:     Acetaminophen (TYLENOL PO), Take 325 mg by mouth every 4 hours as needed for mild pain or fever, Disp: , Rfl:      Albuterol (VENTOLIN IN), 2-4 puffs every 4-6 hours as needed.Shannon Dao LPN 3:39 PM on 10/29/2018, Disp: , Rfl:      Alendronate Sodium 70 MG TBEF, Take by mouth every 7 days, Disp: , Rfl:      ASMANEX  MCG/ACT inhaler, 200 mcg, Disp: , Rfl: 12     ATORVASTATIN CALCIUM PO, Take 20 mg by mouth every morning, Disp: , Rfl:      Biotin 2500 MCG CAPS, Take 5,000 mcg/day by mouth, Disp: , Rfl:      Calcium Carbonate-Vit D-Min (CALCIUM 1200 PO), Take 1,000 Units by mouth 1 tab daily, Disp: , Rfl:      Cholecalciferol (VITAMIN D) 1000 UNITS capsule, Take 5,000 Units by mouth daily , Disp: , Rfl:      Coenzyme Q10 (COQ10) 200 MG CAPS, Take 200 mg by mouth daily, Disp: , Rfl:      CVS OMEGA-3 KRILL  MG CAPS, Take  by mouth., Disp: , Rfl:      Flaxseed, Linseed, POWD, Take 1-2 tsp by mouth daily., Disp: , Rfl:      fluocinonide (LIDEX) 0.05 % ointment, Apply topically 2 times daily, Disp: , Rfl:      Omega-3 Fatty Acids (CVS NATURAL FISH OIL) 1000 MG CAPS, Take 1,400 mg by mouth daily, Disp: , Rfl:      TRAZODONE HCL PO, Take 100 mg by mouth At Bedtime, Disp: , Rfl:       triamcinolone (KENALOG) 0.5 % OINT ointment, Apply to affected area twice a day, when rash is active, Disp: 30 g, Rfl: 0       Allergies:   Penicillins     Past Medical History:  Adjustment disorder with mixed anxiety and depressed mood  Chronic cough  Depression  Anxiety  Chronic neck pain  Lumbago  Gastroesophageal reflux disease (GERD)  Hyperlipidemia  Hypertension  Hypokalemia  Insomnia  Intestinal disaccharidase deficiencies and disaccharide malabsorption   Localized osteoarthrosis, hand  Osteopenia  Osteoporosis  Irritable bowel syndrome   Eczema  Idiopathic thrombocytopenia  Urinary incontinence  Dysphagia  Macular drusen  Posterior vitreous detachment  Venous stasis  Cataract  Temporomandibular joint syndrome (TMJ)    Chronic pruritus   Digestive problems  Low back pain with bilateral sciatica   Palpitations      Past Surgical History:  Biopsy of breast, incisional -   Appendectomy -   Ligate fallopian tube -     Family History:   Gastrointestinal disease - father   in [place], presented as bad headache - sister (Roosevelt)   in childbirth - mother  Mental health - sister (Denilson), brother (Mendy)  Vision loss - sister (Iva)  Diabetes - sister (Eva)     Social History:   The patient is  with 5 children (first child is ), a nonsmoker, and consumes a glass of wine after eating dinner twice a week. She is originally from [facility]. She has been living here since . She is retired. 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.     Physical Exam:   /74 (BP Location: Right arm, Patient Position: Sitting, Cuff Size: Adult Regular)  Pulse 62  Temp 97.9  F (36.6  C) (Oral)  Resp 20  Wt 61.3 kg (135 lb 3.2 oz)  SpO2 98%  BMI 27.81 kg/m2   Physical Examination: deferred      Assessment and Plan:  Eczema, unspecified type, Chronic pruritus   She has intermittent flares of eczema for which she has been treating with a gel. Advised her to discontinue  use of the gel and instead use Kenalog ointment twice daily when the rash is active.   - triamcinolone (KENALOG) 0.5 % OINT ointment  Dispense: 30 g; Refill: 0    Vitamin D deficiency  She is currently taking 6000 units of Vitamin D daily. Will check her level today. We may need to adjust the dosage of her Vitamin D supplement. Pending results will follow up as needed.   - Vitamin D Deficiency    Adjustment disorder with anxious mood  She has been experiencing anxiety associated with caring for her . This anxious feeling will wake her up at night. She is currently treating this with meditation and 100 mg of Trazodone. She does not believe the Trazodone is helping her sleep. I advised her to follow up with one of our health psychologists to further evaluate her anxiety and also to address the stress associated with caring for her .   - PSYCHOLOGY ([facility] HEALTH PSYCHOLOGY) REFERRAL    Other insomnia  See above.   - PSYCHOLOGY ([facility] HEALTH PSYCHOLOGY) REFERRAL       Follow-up: Return in about 4 weeks (around 11/26/2018) for Physical Exam.       Total time spent 45  minutes.  More than 50% of the time spent with Ms. Coronel on counseling / coordinating her care        Scribe Disclosure:   I, Iris Mcduffie, am serving as a scribe to document services personally performed by Ling Luna MD at this visit, based upon the provider's statements to me. All documentation has been reviewed by the aforementioned provider prior to being entered into the official medical record.     Portions of this medical record were completed by a scribe. UPON MY REVIEW AND AUTHENTICATION BY ELECTRONIC SIGNATURE, this confirms (a) I performed the applicable clinical services, and (b) the record is accurate.        Ling Luna M.D.  Internal Medicine  Primary Care Center   pager [phone]

## 2018-10-29 NOTE — NURSING NOTE
Chief Complaint   Patient presents with     Establish Care     establish care/provider     Tingling     tingling in hands, arms and neck   Shannon Dao LPN 2:36 PM on 10/29/2018    Will bring copy of Health Directive/living will to clinic to have scanned into chart.Shannon Dao LPN 2:38 PM on 10/29/2018  Rooming Note  Health Maintenance   Health Maintenance Due   Topic Date Due     MICROALBUMIN Q1 YEAR  02/19/2014     ADVANCE DIRECTIVE PLANNING Q5 YRS  01/19/2017    All health maintenance items discussed and pended.  Shannon Dao LPN 2:41 PM on 10/29/2018  Rooming Note  Blood Pressure   BP Readings from Last 1 Encounters:   10/29/18 167/72    Single BP recheck started, 2:42 PM (4 minutes)  Shannon Dao LPN 2:42 PM on 10/29/2018

## 2018-10-30 LAB — DEPRECATED CALCIDIOL+CALCIFEROL SERPL-MC: 67 UG/L (ref 20–75)

## 2018-11-06 ENCOUNTER — TRANSFERRED RECORDS (OUTPATIENT)
Dept: HEALTH INFORMATION MANAGEMENT | Facility: CLINIC | Age: 78
End: 2018-11-06

## 2018-11-06 DIAGNOSIS — M81.0 OSTEOPOROSIS, UNSPECIFIED OSTEOPOROSIS TYPE, UNSPECIFIED PATHOLOGICAL FRACTURE PRESENCE: Primary | ICD-10-CM

## 2018-11-28 ENCOUNTER — OFFICE VISIT (OUTPATIENT)
Dept: INTERNAL MEDICINE | Facility: CLINIC | Age: 78
End: 2018-11-28
Payer: COMMERCIAL

## 2018-11-28 VITALS
DIASTOLIC BLOOD PRESSURE: 81 MMHG | WEIGHT: 132 LBS | BODY MASS INDEX: 27.71 KG/M2 | OXYGEN SATURATION: 99 % | HEIGHT: 58 IN | SYSTOLIC BLOOD PRESSURE: 164 MMHG | HEART RATE: 61 BPM

## 2018-11-28 DIAGNOSIS — Z00.00 PREVENTATIVE HEALTH CARE: ICD-10-CM

## 2018-11-28 DIAGNOSIS — M94.9 DISORDER OF BONE AND CARTILAGE: ICD-10-CM

## 2018-11-28 DIAGNOSIS — R09.81 CONGESTION OF PARANASAL SINUS: ICD-10-CM

## 2018-11-28 DIAGNOSIS — I10 HYPERTENSION GOAL BP (BLOOD PRESSURE) < 140/90: ICD-10-CM

## 2018-11-28 DIAGNOSIS — J45.30 MILD PERSISTENT ASTHMA WITHOUT COMPLICATION: ICD-10-CM

## 2018-11-28 DIAGNOSIS — E78.5 HYPERLIPIDEMIA LDL GOAL <130: ICD-10-CM

## 2018-11-28 DIAGNOSIS — K21.9 GASTROESOPHAGEAL REFLUX DISEASE WITHOUT ESOPHAGITIS: ICD-10-CM

## 2018-11-28 DIAGNOSIS — D69.3 IDIOPATHIC THROMBOCYTOPENIA (H): ICD-10-CM

## 2018-11-28 DIAGNOSIS — Z23 NEED FOR VACCINATION: ICD-10-CM

## 2018-11-28 DIAGNOSIS — F43.23 ADJUSTMENT DISORDER WITH MIXED ANXIETY AND DEPRESSED MOOD: ICD-10-CM

## 2018-11-28 DIAGNOSIS — Z00.00 ROUTINE HISTORY AND PHYSICAL EXAMINATION OF ADULT: Primary | ICD-10-CM

## 2018-11-28 DIAGNOSIS — M89.9 DISORDER OF BONE AND CARTILAGE: ICD-10-CM

## 2018-11-28 DIAGNOSIS — I10 BENIGN ESSENTIAL HYPERTENSION: ICD-10-CM

## 2018-11-28 LAB
ERYTHROCYTE [DISTWIDTH] IN BLOOD BY AUTOMATED COUNT: 14.3 % (ref 10–15)
HBA1C MFR BLD: 5.7 % (ref 0–5.6)
HCT VFR BLD AUTO: 40.7 % (ref 35–47)
HGB BLD-MCNC: 12.7 G/DL (ref 11.7–15.7)
MCH RBC QN AUTO: 26.3 PG (ref 26.5–33)
MCHC RBC AUTO-ENTMCNC: 31.2 G/DL (ref 31.5–36.5)
MCV RBC AUTO: 84 FL (ref 78–100)
PLATELET # BLD AUTO: 261 10E9/L (ref 150–450)
RBC # BLD AUTO: 4.82 10E12/L (ref 3.8–5.2)
WBC # BLD AUTO: 4.8 10E9/L (ref 4–11)

## 2018-11-28 RX ORDER — OMEGA-3 FATTY ACIDS/FISH OIL 300-1000MG
200 CAPSULE ORAL EVERY 4 HOURS PRN
COMMUNITY
End: 2019-09-19

## 2018-11-28 RX ORDER — ATORVASTATIN CALCIUM 10 MG/1
20 TABLET, FILM COATED ORAL DAILY
Qty: 90 TABLET | Refills: 3 | Status: SHIPPED | OUTPATIENT
Start: 2018-11-28 | End: 2018-11-28

## 2018-11-28 RX ORDER — ALBUTEROL SULFATE 90 UG/1
1-2 AEROSOL, METERED RESPIRATORY (INHALATION) EVERY 6 HOURS PRN
Qty: 3 INHALER | Refills: 3 | Status: CANCELLED | OUTPATIENT
Start: 2018-11-28

## 2018-11-28 RX ORDER — ATORVASTATIN CALCIUM 10 MG/1
40 TABLET, FILM COATED ORAL DAILY
Qty: 90 TABLET | Refills: 3 | Status: SHIPPED | OUTPATIENT
Start: 2018-11-28 | End: 2018-12-04

## 2018-11-28 RX ORDER — FLUTICASONE PROPIONATE 50 MCG
1 SPRAY, SUSPENSION (ML) NASAL DAILY
Qty: 1 BOTTLE | Refills: 3 | Status: SHIPPED | OUTPATIENT
Start: 2018-11-28

## 2018-11-28 ASSESSMENT — ENCOUNTER SYMPTOMS
STIFFNESS: 1
DECREASED CONCENTRATION: 1
MYALGIAS: 0
NUMBNESS: 1
HALLUCINATIONS: 0
INCREASED ENERGY: 0
JAUNDICE: 0
NAIL CHANGES: 0
FEVER: 1
DISTURBANCES IN COORDINATION: 0
NECK PAIN: 1
CONSTIPATION: 1
MUSCLE WEAKNESS: 0
ALTERED TEMPERATURE REGULATION: 0
BLOOD IN STOOL: 0
DIFFICULTY URINATING: 0
ABDOMINAL PAIN: 0
BLOATING: 0
PANIC: 0
HEADACHES: 1
EYE IRRITATION: 1
MUSCLE CRAMPS: 0
EYE REDNESS: 0
JOINT SWELLING: 1
BOWEL INCONTINENCE: 0
HEMATURIA: 0
DOUBLE VISION: 0
SKIN CHANGES: 0
RECTAL PAIN: 0
BACK PAIN: 1
MEMORY LOSS: 1
SPEECH CHANGE: 0
VOMITING: 0
EYE PAIN: 0
DIARRHEA: 0
EYE WATERING: 1
DEPRESSION: 1
NERVOUS/ANXIOUS: 1
TREMORS: 0
ARTHRALGIAS: 1
SEIZURES: 0
WEAKNESS: 0
LOSS OF CONSCIOUSNESS: 0
INSOMNIA: 1
DIZZINESS: 0
POLYDIPSIA: 0
FLANK PAIN: 0
TINGLING: 1
DYSURIA: 0
NAUSEA: 0
HEARTBURN: 1
PARALYSIS: 0

## 2018-11-28 ASSESSMENT — ANXIETY QUESTIONNAIRES
1. FEELING NERVOUS, ANXIOUS, OR ON EDGE: SEVERAL DAYS
5. BEING SO RESTLESS THAT IT IS HARD TO SIT STILL: SEVERAL DAYS
IF YOU CHECKED OFF ANY PROBLEMS ON THIS QUESTIONNAIRE, HOW DIFFICULT HAVE THESE PROBLEMS MADE IT FOR YOU TO DO YOUR WORK, TAKE CARE OF THINGS AT HOME, OR GET ALONG WITH OTHER PEOPLE: SOMEWHAT DIFFICULT
3. WORRYING TOO MUCH ABOUT DIFFERENT THINGS: SEVERAL DAYS
6. BECOMING EASILY ANNOYED OR IRRITABLE: MORE THAN HALF THE DAYS
7. FEELING AFRAID AS IF SOMETHING AWFUL MIGHT HAPPEN: SEVERAL DAYS
2. NOT BEING ABLE TO STOP OR CONTROL WORRYING: MORE THAN HALF THE DAYS
GAD7 TOTAL SCORE: 10

## 2018-11-28 ASSESSMENT — ACTIVITIES OF DAILY LIVING (ADL)
IN_THE_PAST_7_DAYS,_DID_YOU_NEED_HELP_FROM_OTHERS_TO_TAKE_CARE_OF_THINGS_SUCH_AS_LAUNDRY_AND_HOUSEKEEPING,_BANKING,_SHOPPING,_USING_THE_TELEPHONE,_FOOD_PREPARATION,_TRANSPORTATION,_OR_TAKING_YOUR_OWN_MEDICATIONS?: N
IN_THE_PAST_7_DAYS,_DID_YOU_NEED_HELP_FROM_OTHERS_TO_PERFORM_EVERYDAY_ACTIVITIES_SUCH_AS_EATING,_GETTING_DRESSED,_GROOMING,_BATHING,_WALKING,_OR_USING_THE_TOILET: N

## 2018-11-28 ASSESSMENT — PATIENT HEALTH QUESTIONNAIRE - PHQ9
5. POOR APPETITE OR OVEREATING: MORE THAN HALF THE DAYS
SUM OF ALL RESPONSES TO PHQ QUESTIONS 1-9: 7

## 2018-11-28 ASSESSMENT — PAIN SCALES - GENERAL: PAINLEVEL: MODERATE PAIN (4)

## 2018-11-28 NOTE — PROGRESS NOTES
[facility]  Primary Care Center   Ling Luna MD  11/28/2018      Chief Complaint:   No chief complaint on file.       History of Present Illness:   Alena Coronel is a 78 year old female with a history of hypothyroidism, hyperlipidemia, GERD, and isomnia who presents for evaluation of .    Having pain in shoulder to R neck, and in her back. Taking ibuprofen on occasion. Two nights ago having some tingling the past two nights.    Accidentally checked sleep apnea but does not have this.    Using asthma medications as needed, generally twice a week. Sometimes with wheezing.  [ ] BCBS suggested     No falls.  Today has high blood pressure, and says she has a headache.  Question about pneumonia vaccine.    Using fluticasone nasal spray as needed.    Colonoscopy up to date.  DEXA up to date.  Cholesterol levels good    Last visit ***/***/18  Bone density  Anxiety - due to caring for her , currently on 100 mg Trazodone, follow-up with psychology   Eczema - using gel but told to discontinue, instead use Kenalog ointment 2x daiy   Arthritis  Pain  Heart palpitations  stomachpain     The 10-year ASCVD risk score (Media DC Jr, et al., 2013) is: 29.1%    Values used to calculate the score:      Age: 78 years      Sex: Female      Is Non- : No      Diabetic: No      Tobacco smoker: No      Systolic Blood Pressure: 173 mmHg      Is BP treated: No      HDL Cholesterol: 47 mg/dL      Total Cholesterol: 144 mg/dL      Other concerns discussed:  ***     Review of Systems:   Pertinent items are noted in HPI, remainder of complete ROS is negative.      Active Medications:     Current Outpatient Prescriptions:      Acetaminophen (TYLENOL PO), Take 325 mg by mouth every 4 hours as needed for mild pain or fever, Disp: , Rfl:      Albuterol (VENTOLIN IN), 2-4 puffs every 4-6 hours as needed.Shannon Dao LPN 3:39 PM on 10/29/2018, Disp: , Rfl:      Alendronate Sodium 70 MG TBEF, Take 70 mg by mouth  every 7 days, Disp: 12 tablet, Rfl: 3     ASMANEX  MCG/ACT inhaler, 200 mcg, Disp: , Rfl: 12     ATORVASTATIN CALCIUM PO, Take 20 mg by mouth every morning, Disp: , Rfl:      Biotin 2500 MCG CAPS, Take 5,000 mcg/day by mouth, Disp: , Rfl:      Calcium Carbonate-Vit D-Min (CALCIUM 1200 PO), Take 1,000 Units by mouth 1 tab daily, Disp: , Rfl:      Cholecalciferol (VITAMIN D) 1000 UNITS capsule, Take 5,000 Units by mouth daily , Disp: , Rfl:      Coenzyme Q10 (COQ10) 200 MG CAPS, Take 200 mg by mouth daily, Disp: , Rfl:      CVS OMEGA-3 KRILL  MG CAPS, Take  by mouth., Disp: , Rfl:      Flaxseed, Linseed, POWD, Take 1-2 tsp by mouth daily., Disp: , Rfl:      fluocinonide (LIDEX) 0.05 % ointment, Apply topically 2 times daily, Disp: , Rfl:      Omega-3 Fatty Acids (CVS NATURAL FISH OIL) 1000 MG CAPS, Take 1,400 mg by mouth daily, Disp: , Rfl:      TRAZODONE HCL PO, Take 100 mg by mouth At Bedtime, Disp: , Rfl:      triamcinolone (KENALOG) 0.5 % OINT ointment, Apply to affected area twice a day, when rash is active, Disp: 30 g, Rfl: 0      Taking alendronate and atorvastatin, and trazodone.    Allergies:   Penicillins; Shellfish-derived products; and Sulfacetamide      Past Medical History:  Palpitations  Low back pain  Digestive problems  Chronic pruritis  TMJ - temporomandibular joint syndrome  Cataract  Venous stasis  Posterior vitreous detachment  Macular drusen  Urinary incontinence  Idiopathic thrombocytopenia  Eczema  IBS  Osteoporosis  Osteoarthrosis,hand  Intestinal disaccharidase deficiencies  Insomnia  Hypokalemia  Hypertension  Hyperlipidemia  GERD  Chronic neck pain  Anxiety and depression  Chronic cough   Adjustment disorder      Past Surgical History:  Breast biopsy  Appendectomy  Ligate fallopian tube    Family History:   Mother:  during childbirth  Father: GI disease  Sister:  in [place], schizophrenia, paranoia, diabetes   Brother: mental illness       Social History:   The patient is   with 5 children, a nonsmoker, and consumes alcohol.    Only rare wine every 2 or 3 weeks.  Nervous about daughters wedding and honeymoon. And taking care of . Sometimes children are loud.  From [place].       Physical Exam:   There were no vitals taken for this visit.   Physical Examination:  General:  Pleasant, alert, no acute distress  Eyes:  Pupils 2-3 mm, sclera white, EOM's full.    Ears:  TM's normal, EAC's patent, clear of cerumen  Nose:  Nasal passages clear, turbinates not swollen  Throat/Mouth:  No pharyngeal erythema or exudate, oral mucosa and tongue moist, no suspicious oral lesions  Neck:  Full AROM, supple, thyroid smooth, symmetric, not enlarged, no nodules  Lungs:  Clear to auscultation throughout, no wheezes, rhonchi or rales.  C/V:  Regular rate and rhythm, no murmurs, rubs or gallops.  No JVD, no carotid bruits.  No peripheral edema.   Abdomen:  Not distended.  Bowel sounds active.  No tenderness, no hepatosplenomegaly or masses.  No CVA tenderness or masses.  Lymph:  No cervical lymph nodes.  Neuro:  Alert and oriented, face symmetric. No tremor.  Gait steady.    M/S:   No joint deformities noted.  No joint swelling.  Skin:   No rashes or jaundice.  Normal moisture, good skin turgor.  Psych:  Broad range affect.  Not psychomotor slowed.  No signs of anxiety or agitation.      Assessment and Plan:  There are no diagnoses linked to this encounter.     [ ] hoping to check in on trazodone with psychiatris  [ ] Can order more fluticasone   [ ] no A1C on record  [ ] advanced directive    Follow-up: Data Unavailable         Scribe Disclosure:   Lissett WHITTINGTON, am serving as a scribe to document services personally performed by Ling Luna MD at this visit, based upon the provider's statements to me. All documentation has been reviewed by the aforementioned provider prior to being entered into the official medical record.     Portions of this medical record were completed by a  scribe. UPON MY REVIEW AND AUTHENTICATION BY ELECTRONIC SIGNATURE, this confirms (a) I performed the applicable clinical services, and (b) the record is accurate.

## 2018-11-28 NOTE — MR AVS SNAPSHOT
After Visit Summary   11/28/2018    Alena Coronel    MRN: [id number]           Patient Information     Date Of Birth          1940        Visit Information        Provider Department      11/28/2018 9:30 AM Ling Luna MD [facility]        Today's Diagnoses     Routine history and physical examination of adult    -  1    Adjustment disorder with mixed anxiety and depressed mood        Hypertension goal BP (blood pressure) < 140/90        Hyperlipidemia LDL goal <130        Disorder of bone and cartilage        Idiopathic thrombocytopenia (H)        Gastroesophageal reflux disease without esophagitis        Need for vaccination        Mild persistent asthma without complication        Congestion of paranasal sinus        Preventative health care        Benign essential hypertension          Care Instructions    Primary Care Center Medication Refill Request Information:  * Please contact your pharmacy regarding ANY request for medication refills.  ** [facility] Prescription Fax = [phone]  * Please allow 3 business days for routine medication refills.  * Please allow 5 business days for controlled substance medication refills.     Primary Care Center Test Result notification information:  *You will be notified with in 7-10 days of your appointment day regarding the results of your test.  If you are on MyChart you will be notified as soon as the provider has reviewed the results and signed off on them.    [facility]: [phone]       Here is some advice regarding bone health:  1.  Make sure you are getting enough calcium and vitamin D.    Total diet plus supplement recommended intake for calcium is:    1200 mg daily (in divided doses)  Total diet plus supplement recommended intake for Vitamin D is:    5000 units daily (once daily)  2.    Do not take more than the recommended amount of Vitamin D without consulting your provider. Vitamin D toxicity can cause  non-specific symptoms such as anorexia, weight loss, polyuria, and heart arrhythmias. More seriously, it can also raise blood levels of calcium which leads to vascular and tissue calcification, with subsequent damage to the heart, blood vessels, and kidneys.  3.  Do not take more than the recommended amount of Calcium without consulting your provider.  Excessively high levels of calcium in the blood known as hypercalcemia can cause renal insufficiency, vascular and soft tissue calcification, hypercalciuria (high levels of calcium in the urine) and kidney stones.  4.  Calcium Carbonate should be taken with food.  Calcium Citrate can be taken with or without food.  5.  Physical activity is important. Consider activities such as walking, water workouts or   eric chi . Include resistance type exercises.  6.  In order for your skin to manufacture enough Vitamin D, you need some exposure to the sun.  Recommendation is approximately 5-30 minutes of sun exposure between 10 AM and 3 PM at least twice a week to the face, arms, legs, or back without sunscreen.  Do not use tanning beds.  7.  Decrease your risk of falls.  Wear sensible shoes. Review your medications with your provider. See additional recommendations below.   8.  See tables below regarding the content of calcium and Vitamin D in various foods.      Reduce your risk of falls:  Remove home hazards  Take a look around your home. Your living room, kitchen, bedroom, bathroom, hallways and stairways may be filled with hazards. To make your home safer:   Remove boxes, newspapers, electrical cords and phone cords from walkways.   Move coffee tables, magazine racks and plant stands from high-traffic areas.   Secure loose rugs with double-faced tape, tacks or a slip-resistant backing -- or remove loose rugs from your home.   Repair loose, wooden floorboards and carpeting right away.   Store clothing, dishes, food and other necessities within easy reach.   Immediately clean  spilled liquids, grease or food.   Use nonskid floor wax.   Use nonslip mats in your bathtub or shower.   Light up your living space  Keep your home brightly lit to avoid tripping on objects that are hard to see. Also:   Place night lights in your bedroom, bathroom and hallways.   Place a lamp within reach of your bed for middle-of-the-night needs.   Make clear paths to light switches that aren't near room entrances. Consider trading traditional switches for glow-in-the-dark or illuminated switches.   Turn on the lights before going up or down stairs.   Store flashlights in easy-to-find places in case of power outages.   Use assistive devices  Your doctor might recommend using a cane or walker to keep you steady. Other assistive devices can help, too. For example:   Hand rails for both sides of stairways   Nonslip treads for bare-wood steps   A raised toilet seat or one with armrests   Grab bars for the shower or tub   A sturdy plastic seat for the shower or tub -- plus a hand-held shower nozzle for bathing while sitting down   Source: http://www.[facility]/health/fall-prevention/      Calcium Content of Foods:     Food Milligrams (mg)  per serving     Yogurt, plain, low fat, 8 ounces 415    Orange juice, calcium-fortified, 6 ounces 375    Yogurt, fruit, low fat, 8 ounces 338-384    Mozzarella, part skim, 1.5 ounces 333    Sardines, canned in oil, with bones, 3 ounces 325    Cheddar cheese, 1.5 ounces 307    Milk, nonfat, 8 ounces 299    Milk, reduced-fat (2% milk fat), 8 ounces 293    Milk, buttermilk, 8 ounces 282-350    Milk, whole (3.25% milk fat), 8 ounces 276    Tofu, firm, made with calcium sulfate,   cup 253    [place], pink, canned, solids with bone, 3 ounces 181    Cottage cheese, 1% milk fat, 1 cup 138    Tofu, soft, made with calcium sulfate,   cup 138    Instant breakfast drink, various flavors and brands, powder prepared with water, 8 ounces 105-250    Frozen yogurt, vanilla, soft serve,   cup  103   "  Ready-to-eat cereal, calcium-fortified, 1 cup  100-1,000    Turnip greens, fresh, boiled,   cup  99    Kale, fresh, cooked, 1 cup  94    Kale, raw, chopped, 1 cup  90    Ice cream, vanilla,   cup  84    Soy beverage, calcium-fortified, 8 ounces       Chinese cabbage, bok snyder, raw, shredded, 1 cup  74    Bread, white, 1 slice  73    Pudding, chocolate, ready to eat, refrigerated, 4 ounces  55    Tortilla, corn, ready-to-bake/baeza, one 6\" diameter 46    Tortilla, flour, ready-to-bake/baeza, one 6\" diameter  32    Sour cream, reduced fat, cultured, 2 tablespoons  31    Bread, whole-wheat, 1 slice  30    Broccoli, raw,   cup  21    Cheese, cream, regular, 1 tablespoon  14    Source:  http://ods.od.nih.gov/factsheets    Selected Food Sources of Vitamin D [ Food IUs per serving]:    Cod liver oil, 1 tablespoon 1,360  Swordfish, cooked, 3 ounces 566  [place] (sockeye), cooked, 3 ounces 447  Tuna fish, canned in water, drained, 3 ounces 154   Orange juice fortified with vitamin D, 1 cup (check product labels, as amount of added vitamin D varies) 137   Milk, nonfat, reduced fat, and whole, vitamin D-fortified, 1 cup 115-124   Yogurt, fortified with 20% of the DV for vitamin D, 6 ounces (more heavily fortified yogurts provide more of the DV) 80  Margarine, fortified, 1 tablespoon 60   Sardines, canned in oil, drained, 2 sardines 46  Liver, beef, cooked, 3 ounces 42  Egg, 1 large (vitamin D is found in yolk) 41  Ready-to-eat cereal, fortified with 10% of the DV for vitamin D, 0.75-1 cup (more heavily fortified cereals might provide more of the DV) 40   Cheese, Swiss, 1 ounce.  Source:  Http://ods.od.nih.gov/factsheets                Follow-ups after your visit        Additional Services     MENTAL HEALTH REFERRAL  - Adult; Psychiatry and Medication Management; Psychiatry; [facility]: Psychiatry Clinic [phone]; We will contact you to schedule the appointment or please call with any questions       All scheduling is subject " to the client's specific insurance plan & benefits, provider/location availability, and provider clinical specialities.  Please arrive 15 minutes early for your first appointment and bring your completed paperwork.    Please be aware that coverage of these services is subject to the terms and limitations of your health insurance plan.  Call member services at your health plan with any benefit or coverage questions.                            Your next 10 appointments already scheduled     Jan 03, 2019  7:40 AM CST   (Arrive by 7:25 AM)   Return Visit with Ling Luna MD   [facility] ([facility])    [address] [place] [address]  [address] [facility] [address] [phone]              Who to contact     Please call your clinic at [phone] to:    Ask questions about your health    Make or cancel appointments    Discuss your medicines    Learn about your test results    Speak to your doctor            Additional Information About Your Visit        [facility] Information     [facility] [employer] gives you secure access to your electronic health record. If you see a primary care provider, you can also send messages to your care team and make appointments. If you have questions, please call your primary care clinic.  If you do not have a primary care provider, please call [phone] and they will assist you.      [employer] is an electronic gateway that provides easy, online access to your medical records. With [employer], you can request a clinic appointment, read your test results, renew a prescription or communicate with your care team.     To access your existing account, please contact your [facility] or call [phone] for assistance.        Care EveryWhere ID     This is your Care EveryWhere ID. This could be used by other organizations to access your [place] medical records  [id number]        Your Vitals Were     Pulse Height Pulse Oximetry BMI  "(Body Mass Index)          61 1.485 m (4' 10.47\") 99% 27.15 kg/m2         Blood Pressure from Last 3 Encounters:   11/28/18 164/81   10/29/18 128/74   09/20/18 126/70    Weight from Last 3 Encounters:   11/28/18 59.9 kg (132 lb)   10/29/18 61.3 kg (135 lb 3.2 oz)   09/20/18 60 kg (132 lb 3.2 oz)              We Performed the Following     MENTAL HEALTH REFERRAL  - Adult; Psychiatry and Medication Management; Psychiatry; [facility]: Psychiatry Clinic [phone]; We will contact you to schedule the appointment or please call with any questions          Today's Medication Changes          These changes are accurate as of 11/28/18  6:51 PM.  If you have any questions, ask your nurse or doctor.               Start taking these medicines.        Dose/Directions    albuterol 108 (90 Base) MCG/ACT inhaler   Commonly known as:  PROAIR RESPICLICK   Used for:  Mild persistent asthma without complication   Started by:  Ling Luna MD        Dose:  2 puff   Inhale 2 puffs into the lungs every 6 hours as needed for shortness of breath / dyspnea or wheezing   Quantity:  1 Inhaler   Refills:  1       atorvastatin 10 MG tablet   Commonly known as:  LIPITOR   Used for:  Hyperlipidemia LDL goal <130   Started by:  Ling Luna MD        Dose:  40 mg   Take 4 tablets (40 mg) by mouth daily   Quantity:  90 tablet   Refills:  3       Blood Pressure Monitor Kit   Used for:  Benign essential hypertension   Started by:  Ling Luna MD        Dose:  1 Device   1 Device daily   Quantity:  1 kit   Refills:  1       fluticasone 50 MCG/ACT nasal spray   Commonly known as:  FLONASE   Used for:  Congestion of paranasal sinus   Started by:  Ling Luna MD        Dose:  1 spray   Spray 1 spray into both nostrils daily   Quantity:  1 Bottle   Refills:  3         These medicines have changed or have updated prescriptions.        Dose/Directions    mometasone furoate 200 MCG/ACT inhaler   Commonly known as:  ASMANEX HFA   This " may have changed:    - how much to take  - how to take this  - when to take this   Used for:  Mild persistent asthma without complication   Changed by:  Ling Luna MD        Dose:  2 puff   Inhale 2 puffs into the lungs 2 times daily   Quantity:  3 Inhaler   Refills:  3            Where to get your medicines      These medications were sent to [id number] - [place], [place] - [address] [place], [place] [address]E NE AT [facility] [address]  [address] [facility] [facility] [address]     Phone:  [phone]     albuterol 108 (90 Base) MCG/ACT inhaler    atorvastatin 10 MG tablet    Blood Pressure Monitor Kit    fluticasone 50 MCG/ACT nasal spray    mometasone furoate 200 MCG/ACT inhaler                Primary Care Provider Office Phone # Fax #    Kristen Monzon [phone] [address] [facility] [address]        Equal Access to Services     ALISTAIR DURAN AH: Hadii tish ku hadasho Soomaali, waaxda luqadaha, qaybta kaalmada adeegyada, waxay gigiin hayherbn mor mckoy . So [facility] [phone].    ATENCIÓN: Si habla español, tiene a robles disposición servicios gratuitos de asistencia lingüística. Llame al [phone].    We comply with applicable federal civil rights laws and [place] laws. We do not discriminate on the basis of race, color, national origin, age, disability, sex, sexual orientation, or gender identity.            Thank you!     Thank you for choosing [facility]  for your care. Our goal is always to provide you with excellent care. Hearing back from our patients is one way we can continue to improve our services. Please take a few minutes to complete the written survey that you may receive in the mail after your visit with us. Thank you!             Your Updated Medication List - Protect others around you: Learn how to safely use, store and throw away your medicines at www.disposemymeds.org.          This list is accurate as of 11/28/18   6:51 PM.  Always use your most recent med list.                   Brand Name Dispense Instructions for use Diagnosis    albuterol 108 (90 Base) MCG/ACT inhaler    PROAIR RESPICLICK    1 Inhaler    Inhale 2 puffs into the lungs every 6 hours as needed for shortness of breath / dyspnea or wheezing    Mild persistent asthma without complication       Alendronate Sodium 70 MG Tbef     12 tablet    Take 70 mg by mouth every 7 days    Osteoporosis, unspecified osteoporosis type, unspecified pathological fracture presence       atorvastatin 10 MG tablet    LIPITOR    90 tablet    Take 4 tablets (40 mg) by mouth daily    Hyperlipidemia LDL goal <130       Biotin 2500 MCG Caps      Take 5,000 mcg/day by mouth        Blood Pressure Monitor Kit     1 kit    1 Device daily    Benign essential hypertension       CALCIUM 1200 PO      Take 1,000 Units by mouth 1 tab daily        CoQ10 200 MG Caps      Take 200 mg by mouth daily        [employer] NATURAL FISH OIL 1000 MG Caps      Take 1,400 mg by mouth daily        [employer] OMEGA-3 KRILL  MG Caps      Take  by mouth.        Flaxseed (Linseed) Powd      Take 1-2 tsp by mouth daily.        fluticasone 50 MCG/ACT nasal spray    FLONASE    1 Bottle    Spray 1 spray into both nostrils daily    Congestion of paranasal sinus       ibuprofen 200 MG capsule    ADVIL/MOTRIN     Take 200 mg by mouth every 4 hours as needed for fever        LIDEX 0.05 % external ointment   Generic drug:  fluocinonide      Apply topically 2 times daily        mometasone furoate 200 MCG/ACT inhaler    ASMANEX HFA    3 Inhaler    Inhale 2 puffs into the lungs 2 times daily    Mild persistent asthma without complication       TRAZODONE HCL PO      Take 100 mg by mouth At Bedtime        triamcinolone 0.5 % external ointment    KENALOG    30 g    Apply to affected area twice a day, when rash is active    Eczema, unspecified type       TYLENOL PO      Take 325 mg by mouth every 4 hours as needed for mild pain or fever         VENTOLIN IN      2-4 puffs every 4-6 hours as needed.Shannon Dao LPN 3:39 PM on 10/29/2018        vitamin D 1000 units capsule      Take 5,000 Units by mouth daily

## 2018-11-28 NOTE — PROGRESS NOTES
[facility]  Primary Care Center   Ling Luna MD  11/28/2018      Chief Complaint:   Pain and Fever. Routine physical     History of Present Illness:   Alena Coronel is a 78 year old female with a history of hypothyroidism, hyperlipidemia, GERD, and isomnia who presents for evaluation of pain and fever.  According to scheduling, she requested a routine physical .    Overall doing well. Her daughter was  this year, and she has been taking care of her  with Parkinson's.     Today has high blood pressure and says has slight headache. Also having pain in shoulder to R neck, and in her back. Taking ibuprofen on occasion. The last two nights having some tingling in arms when trying to sleep, but no weakness or other changes.    Had what she thinks was a fever 2 nights ago, resolved.  No focal symptoms indicating infection.    Does have anxiety in general and regarding her children and taking care of her  who has Parkinson's. Managed decently as she meditates, goes to [facility], exercises and maintains connections with friends by talking on the phone.    Using asthma medications as needed, generally twice a week. Sometimes with wheezing which will respond to albuterol. [employer] suggested different medication for cost purposes.     Using fluticasone nasal spray as needed for congestion.    Routine health care maintenance reviewed.  Colonoscopy up to date. DEXA up to date (consistent with osteopenia). On alendronate, taking consistently. No falls. Cholesterol levels good in September.    Review of Systems:   Pertinent items are noted in HPI, remainder of complete ROS is negative.      Active Medications:     Current Outpatient Prescriptions:      Acetaminophen (TYLENOL PO), Take 325 mg by mouth every 4 hours as needed for mild pain or fever, Disp: , Rfl:      Albuterol (VENTOLIN IN), 2-4 puffs every 4-6 hours as needed.Shannon Dao LPN 3:39 PM on 10/29/2018, Disp: , Rfl:      Alendronate  Sodium 70 MG TBEF, Take 70 mg by mouth every 7 days, Disp: 12 tablet, Rfl: 3     ASMANEX  MCG/ACT inhaler, 200 mcg, Disp: , Rfl: 12     ATORVASTATIN CALCIUM PO, Take 20 mg by mouth every morning, Disp: , Rfl:      Calcium Carbonate-Vit D-Min (CALCIUM 1200 PO), Take 1,000 Units by mouth 1 tab daily, Disp: , Rfl:      [employer] OMEGA-3 KRILL  MG CAPS, Take  by mouth., Disp: , Rfl:      Flaxseed, Linseed, POWD, Take 1-2 tsp by mouth daily., Disp: , Rfl:      fluocinonide (LIDEX) 0.05 % ointment, Apply topically 2 times daily, Disp: , Rfl:      ibuprofen (ADVIL/MOTRIN) 200 MG capsule, Take 200 mg by mouth every 4 hours as needed for fever, Disp: , Rfl:      Omega-3 Fatty Acids ([facility] NATURAL FISH OIL) 1000 MG CAPS, Take 1,400 mg by mouth daily, Disp: , Rfl:      TRAZODONE HCL PO, Take 100 mg by mouth At Bedtime, Disp: , Rfl:      triamcinolone (KENALOG) 0.5 % OINT ointment, Apply to affected area twice a day, when rash is active, Disp: 30 g, Rfl: 0     Biotin 2500 MCG CAPS, Take 5,000 mcg/day by mouth, Disp: , Rfl:      Cholecalciferol (VITAMIN D) 1000 UNITS capsule, Take 5,000 Units by mouth daily , Disp: , Rfl:      Coenzyme Q10 (COQ10) 200 MG CAPS, Take 200 mg by mouth daily, Disp: , Rfl:       Taking alendronate and atorvastatin, and trazodone.    Allergies:   Penicillins; Shellfish-derived products; and Sulfacetamide      Past Medical History:  Palpitations  Low back pain  Digestive problems  Chronic pruritis  TMJ - temporomandibular joint syndrome  Cataract  Venous stasis  Posterior vitreous detachment  Macular drusen  Urinary incontinence  Idiopathic thrombocytopenia  Eczema  IBS  Osteoporosis  Osteoarthrosis,hand  Intestinal disaccharidase deficiencies  Insomnia  Hypokalemia  Hypertension  Hyperlipidemia  GERD  Chronic neck pain  Anxiety and depression  Chronic cough   Adjustment disorder      Past Surgical History:  Breast biopsy  Appendectomy  Ligate fallopian tube    Family History:   Mother:   "during childbirth  Father: GI disease  Sister:  in [place], schizophrenia, paranoia, diabetes   Brother: mental illness       Social History:   The patient is  with 5 children, a nonsmoker, and consumes alcohol.  Only rare wine every 2 or 3 weeks.  Nervous about daughters wedding and honeymoon. And taking care of . Sometimes children are loud.  From [facility].       Physical Exam:   /81 (BP Location: Right arm, Patient Position: Sitting)  Pulse 61  Ht 1.485 m (4' 10.47\")  Wt 59.9 kg (132 lb)  SpO2 99%  BMI 27.15 kg/m2   Physical Examination:  General:  Pleasant, alert, no acute distress  Eyes:  Pupils 2-3 mm, sclera white, EOM's full.    Ears:  TM's normal, EAC's patent, clear of cerumen  Nose:  Nasal passages clear, turbinates not swollen  Throat/Mouth:  No pharyngeal erythema or exudate, oral mucosa and tongue moist, no suspicious oral lesions  Neck:  Full AROM, supple, thyroid smooth, symmetric, not enlarged, no nodules  Lungs:  Clear to auscultation throughout, no wheezes, rhonchi or rales.  C/V:  Regular rate and rhythm, no murmurs, rubs or gallops.  No JVD, no carotid bruits.  No peripheral edema.   Abdomen:  Not distended.  Bowel sounds active.  No tenderness, no hepatosplenomegaly or masses.  No CVA tenderness or masses.  Lymph:  No cervical lymph nodes.  Neuro:  Alert and oriented, face symmetric. No tremor.  Gait steady.    M/S:   No joint deformities noted.  No joint swelling.  Skin:   No rashes or jaundice.  Normal moisture, good skin turgor.  Psych:  Broad range affect.  Not psychomotor slowed.  No signs of anxiety or agitation.      Assessment and Plan:    Routine physical    Adjustment disorder with mixed anxiety and depressed mood  PHQ-9 and DAVY filled out today. Currently managing well with exercise, meditation, support from friends but would like to speak with psychiatrist if possible.   - Referral to psychiatrist   - Trazodone    Hypertension goal BP (blood pressure) < " 140/90  Hypertensive today at 164 systolic, with intermittent high and normal pressures in recent months. She says she was nervous filling out the forms, so we may have her recheck in one month.   - Return for blood pressure check in one month    Hyperlipidemia LDL goal <130  The 10-year ASCVD risk score ([place] ANG Jr, et al., 2013) is: 26.6%.  - atorvastatin (LIPITOR) 10 MG tablet  Dispense: 90 tablet; Refill: 3  - aspirin (ASA) 81 MG tablet  Dispense: 90 tablet; Refill: 3    Disorder of bone and cartilage  Last DEXA Sept 2018 with osteopenia.   - Continue alendronate   - Continue with getting sufficient calcium ni diet    Gastroesophageal reflux disease without esophagitis  Some systems but overall controlled with diet and exercise.    Mild persistent asthma without complication  Using rescue inhaler around twice per week currently.  - albuterol (VENTOLIN HFA) 108 (90 Base) MCG/ACT inhaler  Dispense: 3 Inhaler; Refill: 3  - mometasone furoate (ASMANEX HFA) 200 MCG/ACT inhaler  Dispense: 3 Inhaler; Refill: 3     Follow-up: Data Unavailable   Answers for HPI/ROS submitted by the patient on 11/28/2018   General Symptoms: Yes  Skin Symptoms: Yes  HENT Symptoms: No  EYE SYMPTOMS: Yes  HEART SYMPTOMS: No  LUNG SYMPTOMS: No  INTESTINAL SYMPTOMS: Yes  URINARY SYMPTOMS: Yes  GYNECOLOGIC SYMPTOMS: No  BREAST SYMPTOMS: No  SKELETAL SYMPTOMS: Yes  BLOOD SYMPTOMS: No  NERVOUS SYSTEM SYMPTOMS: Yes  MENTAL HEALTH SYMPTOMS: Yes  Fever: Yes  Excessive thirst: No  Feeling hot or cold when others believe the temperature is normal: No  Loss of height: No  Post-operative complications: No  Surgical site pain: No  Hallucinations: No  Change in or Loss of Energy: No  Hyperactivity: No  Confusion: Yes  Changes in hair: Yes  Changes in moles/birth marks: No  Itching: Yes  Rashes: No  Changes in nails: No  Acne: No  Hair in places you don't want it: No  Change in facial hair: No  Warts: No  Scarring: No  Flaking of skin: No  Color changes of  hands/feet in cold : No  Eye pain: No  Vision loss: No  Dry eyes: Yes  Watery eyes: Yes  Double vision: No  Flashing of lights: No  Spots: No  Floaters: No  Redness: No  Crossed eyes: No  Tunnel Vision: No  Yellowing of eyes: No  Eye irritation: Yes  Heart burn or indigestion: Yes  Nausea: No  Vomiting: No  Abdominal pain: No  Bloating: No  Constipation: Yes  Diarrhea: No  Blood in stool: No  Black stools: No  Rectal or Anal pain: No  Fecal incontinence: No  Yellowing of skin or eyes: No  Vomit with blood: No  Change in stools: No  Trouble holding urine or incontinence: Yes  Pain or burning: No  Trouble starting or stopping: No  Increased frequency of urination: Yes  Blood in urine: No  Decreased frequency of urination: No  Frequent nighttime urination: No  Flank pain: No  Difficulty emptying bladder: No  Back pain: Yes  Muscle aches: No  Neck pain: Yes  Swollen joints: Yes  Joint pain: Yes  Bone pain: No  Muscle cramps: No  Muscle weakness: No  Joint stiffness: Yes  Bone fracture: No  Trouble with coordination: No  Dizziness or trouble with balance: No  Fainting or black-out spells: No  Memory loss: Yes  Headache: Yes  Seizures: No  Speech problems: No  Tingling: Yes  Tremor: No  Weakness: No  Difficulty walking: No  Paralysis: No  Numbness: Yes  Nervous or Anxious: Yes  Depression: Yes  Trouble sleeping: Yes  Trouble thinking or concentrating: Yes  Mood changes: No  Panic attacks: No    Signed, Dr. Puma Mckenzie, PGYI IM resident, patient seen and discussed with Dr. Luna    Teaching Physician Note:  I was present during the visit and the patient was seen and examined by me.   I discussed the case with the resident and agree with the note as documented by the resident with the following exceptions:  None.    Ling Luna M.D.  Internal Medicine   pager [phone]

## 2018-11-28 NOTE — NURSING NOTE
Chief Complaint   Patient presents with     Pain     right shoulder pain with radiation to neck ongoing per pt     Fever     patient reported fever 2 nights ago, did not take temperature at that time      Belem Dickerson at 9:31 AM on 11/28/2018.

## 2018-11-28 NOTE — PATIENT INSTRUCTIONS
[facility] Medication Refill Request Information:  * Please contact your pharmacy regarding ANY request for medication refills.  ** [facility] Prescription Fax = [phone]  * Please allow 3 business days for routine medication refills.  * Please allow 5 business days for controlled substance medication refills.     [facility] Test Result notification information:  *You will be notified with in 7-10 days of your appointment day regarding the results of your test.  If you are on MyChart you will be notified as soon as the provider has reviewed the results and signed off on them.    [facility]: [phone]       Here is some advice regarding bone health:  1.  Make sure you are getting enough calcium and vitamin D.    Total diet plus supplement recommended intake for calcium is:    1200 mg daily (in divided doses)  Total diet plus supplement recommended intake for Vitamin D is:    5000 units daily (once daily)  2.    Do not take more than the recommended amount of Vitamin D without consulting your provider. Vitamin D toxicity can cause non-specific symptoms such as anorexia, weight loss, polyuria, and heart arrhythmias. More seriously, it can also raise blood levels of calcium which leads to vascular and tissue calcification, with subsequent damage to the heart, blood vessels, and kidneys.  3.  Do not take more than the recommended amount of Calcium without consulting your provider.  Excessively high levels of calcium in the blood known as hypercalcemia can cause renal insufficiency, vascular and soft tissue calcification, hypercalciuria (high levels of calcium in the urine) and kidney stones.  4.  Calcium Carbonate should be taken with food.  Calcium Citrate can be taken with or without food.  5.  Physical activity is important. Consider activities such as walking, water workouts or   eric chi . Include resistance type exercises.  6.  In order for your skin to manufacture enough Vitamin D, you  need some exposure to the sun.  Recommendation is approximately 5-30 minutes of sun exposure between 10 AM and 3 PM at least twice a week to the face, arms, legs, or back without sunscreen.  Do not use tanning beds.  7.  Decrease your risk of falls.  Wear sensible shoes. Review your medications with your provider. See additional recommendations below.   8.  See tables below regarding the content of calcium and Vitamin D in various foods.      Reduce your risk of falls:  Remove home hazards  Take a look around your home. Your living room, kitchen, bedroom, bathroom, hallways and stairways may be filled with hazards. To make your home safer:   Remove boxes, newspapers, electrical cords and phone cords from walkways.   Move coffee tables, magazine racks and plant stands from high-traffic areas.   Secure loose rugs with double-faced tape, tacks or a slip-resistant backing -- or remove loose rugs from your home.   Repair loose, wooden floorboards and carpeting right away.   Store clothing, dishes, food and other necessities within easy reach.   Immediately clean spilled liquids, grease or food.   Use nonskid floor wax.   Use nonslip mats in your bathtub or shower.   Light up your living space  Keep your home brightly lit to avoid tripping on objects that are hard to see. Also:   Place night lights in your bedroom, bathroom and hallways.   Place a lamp within reach of your bed for middle-of-the-night needs.   Make clear paths to light switches that aren't near room entrances. Consider trading traditional switches for glow-in-the-dark or illuminated switches.   Turn on the lights before going up or down stairs.   Store flashlights in easy-to-find places in case of power outages.   Use assistive devices  Your doctor might recommend using a cane or walker to keep you steady. Other assistive devices can help, too. For example:   Hand rails for both sides of stairways   Nonslip treads for bare-wood steps   A raised toilet seat  "or one with armrests   Grab bars for the shower or tub   A sturdy plastic seat for the shower or tub -- plus a hand-held shower nozzle for bathing while sitting down   Source: http://www.[facility]/health/fall-prevention/      Calcium Content of Foods:     Food Milligrams (mg)  per serving     Yogurt, plain, low fat, 8 ounces 415    Orange juice, calcium-fortified, 6 ounces 375    Yogurt, fruit, low fat, 8 ounces 338-384    Mozzarella, part skim, 1.5 ounces 333    Sardines, canned in oil, with bones, 3 ounces 325    Cheddar cheese, 1.5 ounces 307    Milk, nonfat, 8 ounces 299    Milk, reduced-fat (2% milk fat), 8 ounces 293    Milk, buttermilk, 8 ounces 282-350    Milk, whole (3.25% milk fat), 8 ounces 276    Tofu, firm, made with calcium sulfate,   cup 253    Lafferty, pink, canned, solids with bone, 3 ounces 181    Cottage cheese, 1% milk fat, 1 cup 138    Tofu, soft, made with calcium sulfate,   cup 138    Instant breakfast drink, various flavors and brands, powder prepared with water, 8 ounces 105-250    Frozen yogurt, vanilla, soft serve,   cup  103    Ready-to-eat cereal, calcium-fortified, 1 cup  100-1,000    Turnip greens, fresh, boiled,   cup  99    Kale, fresh, cooked, 1 cup  94    Kale, raw, chopped, 1 cup  90    Ice cream, vanilla,   cup  84    Soy beverage, calcium-fortified, 8 ounces       Chinese cabbage, bok snyder, raw, shredded, 1 cup  74    Bread, white, 1 slice  73    Pudding, chocolate, ready to eat, refrigerated, 4 ounces  55    Tortilla, corn, ready-to-bake/baeza, one 6\" diameter 46    Tortilla, flour, ready-to-bake/baeza, one 6\" diameter  32    Sour cream, reduced fat, cultured, 2 tablespoons  31    Bread, whole-wheat, 1 slice  30    Broccoli, raw,   cup  21    Cheese, cream, regular, 1 tablespoon  14    Source:  http://ods.od.nih.gov/factsheets    Selected Food Sources of Vitamin D [ Food IUs per serving]:    Cod liver oil, 1 tablespoon 1,360  Swordfish, cooked, 3 ounces 566  Lafferty " (sockeye), cooked, 3 ounces 447  Tuna fish, canned in water, drained, 3 ounces 154   Orange juice fortified with vitamin D, 1 cup (check product labels, as amount of added vitamin D varies) 137   Milk, nonfat, reduced fat, and whole, vitamin D-fortified, 1 cup 115-124   Yogurt, fortified with 20% of the DV for vitamin D, 6 ounces (more heavily fortified yogurts provide more of the DV) 80  Margarine, fortified, 1 tablespoon 60   Sardines, canned in oil, drained, 2 sardines 46  Liver, beef, cooked, 3 ounces 42  Egg, 1 large (vitamin D is found in yolk) 41  Ready-to-eat cereal, fortified with 10% of the DV for vitamin D, 0.75-1 cup (more heavily fortified cereals might provide more of the DV) 40   Cheese, Swiss, 1 ounce.  Source:  Http://ods.od.nih.gov/factsheets

## 2018-11-28 NOTE — LETTER
My Asthma Action Plan  Name: Alena Coronel   YOB: 1940  Date: 11/28/2018   My doctor: Ling Luna MD   My clinic: [facility]        My Control Medicine: asthmanex (mometasone)  My Rescue Medicine: albuterol   My Asthma Severity:mild, persitent  Avoid your asthma triggers: Potential triggers for asthma may include allergies (trees, plants, mold, pets, dust),  upper respiratory tract infections, exposure cold air, exposure to irritants (smoke, perfumes, exhaust fumes) and exercise.                   GREEN ZONE   Good Control    I feel good    No cough or wheeze    Can work, sleep and play without asthma symptoms       Take your asthma control medicine every day.     1. If exercise triggers your asthma, take your rescue medication    15 minutes before exercise or sports, and    During exercise if you have asthma symptoms  2. Spacer to use with inhaler: If you have a spacer, make sure to use it with your inhaler             YELLOW ZONE Getting Worse  I have ANY of these:    I do not feel good    Cough or wheeze    Chest feels tight    Wake up at night   1. Keep taking your Green Zone medications  2. Start taking your rescue medicine:    every 20 minutes for up to 1 hour. Then every 4 hours for 24-48 hours.  3. If you stay in the Yellow Zone for more than 12-24 hours, contact your doctor.  4. If you do not return to the Green Zone in 12-24 hours or you get worse, start taking your oral steroid medicine if prescribed by your provider.           RED ZONE Medical Alert - Get Help  I have ANY of these:    I feel awful    Medicine is not helping    Breathing getting harder    Trouble walking or talking    Nose opens wide to breathe       1. Take your rescue medicine NOW  2. If your provider has prescribed an oral steroid medicine, start taking it NOW  3. Call your doctor NOW  4. If you are still in the Red Zone after 20 minutes and you have not reached your doctor:    Take your  rescue medicine again and    Call 911 or go to the emergency room right away    See your regular doctor within 2 weeks of an Emergency Room or Urgent Care visit for follow-up treatment.          Annual Reminders:  Meet with Asthma Educator,  Flu Shot in the Fall, consider Pneumonia Vaccination for patients with asthma (aged 19 and older).    Pharmacy:    [id number] [place] [facility] - [place], [place] - [address]  [id number] - [place], [place] - [address] AT [place] [address] [address]                       Asthma Triggers  How To Control Things That Make Your Asthma Worse    Triggers are things that make your asthma worse.  Look at the list below to help you find your triggers and what you can do about them.  You can help prevent asthma flare-ups by staying away from your triggers.      Trigger                                                          What you can do   Cigarette Smoke  Tobacco smoke can make asthma worse. Do not allow smoking in your home, car or around you.  Be sure no one smokes at a child s day care or school.  If you smoke, ask your health care provider for ways to help you quit.  Ask family members to quit too.  Ask your health care provider for a referral to Quit Plan to help you quit smoking, or call 0-126-127-PLAN.     Colds, Flu, Bronchitis  These are common triggers of asthma. Wash your hands often.  Don t touch your eyes, nose or mouth.  Get a flu shot every year.     Dust Mites  These are tiny bugs that live in cloth or carpet. They are too small to see. Wash sheets and blankets in hot water every week.   Encase pillows and mattress in dust mite proof covers.  Avoid having carpet if you can. If you have carpet, vacuum weekly.   Use a dust mask and HEPA vacuum.   Pollen and Outdoor Mold  Some people are allergic to trees, grass, or weed pollen, or molds. Try to keep your windows closed.  Limit time out doors when pollen  count is high.   Ask you health care provider about taking medicine during allergy season.     Animal Dander  Some people are allergic to skin flakes, urine or saliva from pets with fur or feathers. Keep pets with fur or feathers out of your home.    If you can t keep the pet outdoors, then keep the pet out of your bedroom.  Keep the bedroom door closed.  Keep pets off cloth furniture and away from stuffed toys.     Mice, Rats, and Cockroaches  Some people are allergic to the waste from these pests.   Cover food and garbage.  Clean up spills and food crumbs.  Store grease in the refrigerator.   Keep food out of the bedroom.   Indoor Mold  This can be a trigger if your home has high moisture. Fix leaking faucets, pipes, or other sources of water.   Clean moldy surfaces.  Dehumidify basement if it is damp and smelly.   Smoke, Strong Odors, and Sprays  These can reduce air quality. Stay away from strong odors and sprays, such as perfume, powder, hair spray, paints, smoke incense, paint, cleaning products, candles and new carpet.   Exercise or Sports  Some people with asthma have this trigger. Be active!  Ask your doctor about taking medicine before sports or exercise to prevent symptoms.    Warm up for 5-10 minutes before and after sports or exercise.     Other Triggers of Asthma  Cold air:  Cover your nose and mouth with a scarf.  Sometimes laughing or crying can be a trigger.  Some medicines and food can trigger asthma.

## 2018-11-29 ASSESSMENT — ANXIETY QUESTIONNAIRES: GAD7 TOTAL SCORE: 10

## 2018-11-30 ENCOUNTER — TELEPHONE (OUTPATIENT)
Dept: INTERNAL MEDICINE | Facility: CLINIC | Age: 78
End: 2018-11-30

## 2018-11-30 DIAGNOSIS — E78.5 HYPERLIPIDEMIA LDL GOAL <130: ICD-10-CM

## 2018-11-30 NOTE — TELEPHONE ENCOUNTER
Prior Authorization Retail Medication Request    Medication/Dose: Atorvastatin 10 mg tablets  ICD code (if different than what is on RX):  E78.5  Previously Tried and Failed:  unknown  Rationale:  none    Insurance Name:  [facility]  Insurance ID:  [id number]

## 2018-11-30 NOTE — TELEPHONE ENCOUNTER
Sending message to provider/nurse to verify dose on more recent rx - it reads 10mg 4 tablets (40mg), Qty of 90 tabs. Which equals a 22.5 days supply. If 40mg is correct, would a 40mg tab be ok? Or keep it 10mg tabs?

## 2018-12-04 RX ORDER — ATORVASTATIN CALCIUM 40 MG/1
40 TABLET, FILM COATED ORAL DAILY
Qty: 90 TABLET | Refills: 3 | Status: SHIPPED | OUTPATIENT
Start: 2018-12-04 | End: 2019-12-03

## 2018-12-04 NOTE — TELEPHONE ENCOUNTER
I d/c'd her 10mg tabs today and sent another rx for 40 mg tabs, take one daily, #90 with 3 refills.  SB

## 2018-12-08 ENCOUNTER — TELEPHONE (OUTPATIENT)
Dept: PSYCHIATRY | Facility: CLINIC | Age: 78
End: 2018-12-08

## 2018-12-08 NOTE — TELEPHONE ENCOUNTER
PSYCHIATRY CLINIC PHONE INTAKE     SERVICES REQUESTED / INTERESTED IN          Med Management    Presenting Problem and Brief History                              What would you like to be seen for? (brief description):  Patient was diagnosed with anxiety and depression in 1964 after her daughter  at 15 months old. Patient reports she is afraid something will happen to her in the future and that she thinks too much. She has been taking trazodone 100mg at night (sometimes half pill, sometimes more). She says she tries to forget the anxiety but it comes up all the time. Patient is afraid trazodone is affecting her blood and would like to discuss other options.  Have you received a mental health diagnosis? Yes   Which one (s): Adjustment disorder with anxiety and depression  Is there any history of developmental delay?  No   Are you currently seeing a mental health provider?  No            Who / month last seen:  N/A  Do you have mental health records elsewhere?  N/A  Will you sign a release so we can obtain them?  N/A    Have you ever been hospitalized for psychiatric reasons?  No  Describe:      Do you have current thoughts of self-harm?  No    Do you currently have thoughts of harming others?  No       Substance Use History     Do you have any history of alcohol / illicit drug use?  No  Describe:    Have you ever received treatment for this?  No    Describe:       Social History     Does the patient have a guardian?  No    Name / number:   Have you had an ACT team in last 12 months?  No  Describe:    Do you have any current or past legal issues?  No  Describe:    OK to leave a detailed voicemail?  Yes    Medical/ Surgical History                                   Patient Active Problem List   Diagnosis     Generalized osteoarthrosis, unspecified site     Irritable bowel syndrome     Adjustment disorder with mixed anxiety and depressed mood     Insomnia     Eczema     Hypertension goal BP (blood pressure) <  140/90     Hyperlipidemia LDL goal <130     Chronic neck pain     Hypokalemia     Osteoporosis     Chest pain     Idiopathic thrombocytopenia (H)     RUQ abdominal pain     Immune to hepatitis A     Dysphagia     GERD (gastroesophageal reflux disease)     Advanced directives, counseling/discussion     Macular drusen     PVD (posterior vitreous detachment)     Venous stasis     Anxiety     Cataract     TMJ (temporomandibular joint syndrome)     Urinary incontinence     Chronic cough     Digestive problems     Lumbago     Low back pain with bilateral sciatica     Palpitations     Chronic pruritus     Disorder of bone and cartilage          Medications             Current Outpatient Prescriptions   Medication Sig Dispense Refill     Acetaminophen (TYLENOL PO) Take 325 mg by mouth every 4 hours as needed for mild pain or fever       albuterol (PROAIR RESPICLICK) 108 (90 Base) MCG/ACT inhaler Inhale 2 puffs into the lungs every 6 hours as needed for shortness of breath / dyspnea or wheezing 1 Inhaler 1     Albuterol (VENTOLIN IN) 2-4 puffs every 4-6 hours as needed.Shannon Dao LPN 3:39 PM on 10/29/2018       Alendronate Sodium 70 MG TBEF Take 70 mg by mouth every 7 days 12 tablet 3     atorvastatin (LIPITOR) 40 MG tablet Take 1 tablet (40 mg) by mouth daily 90 tablet 3     Biotin 2500 MCG CAPS Take 5,000 mcg/day by mouth       Blood Pressure Monitor KIT 1 Device daily 1 kit 1     Calcium Carbonate-Vit D-Min (CALCIUM 1200 PO) Take 1,000 Units by mouth 1 tab daily       Cholecalciferol (VITAMIN D) 1000 UNITS capsule Take 5,000 Units by mouth daily        Coenzyme Q10 (COQ10) 200 MG CAPS Take 200 mg by mouth daily       [employer] OMEGA-3 KRILL  MG CAPS Take  by mouth.       Flaxseed, Linseed, POWD Take 1-2 tsp by mouth daily.       fluocinonide (LIDEX) 0.05 % ointment Apply topically 2 times daily       fluticasone (FLONASE) 50 MCG/ACT nasal spray Spray 1 spray into both nostrils daily 1 Bottle 3     ibuprofen  (ADVIL/MOTRIN) 200 MG capsule Take 200 mg by mouth every 4 hours as needed for fever       mometasone furoate (ASMANEX HFA) 200 MCG/ACT inhaler Inhale 2 puffs into the lungs 2 times daily 3 Inhaler 3     Omega-3 Fatty Acids ([employer] NATURAL FISH OIL) 1000 MG CAPS Take 1,400 mg by mouth daily       TRAZODONE HCL PO Take 100 mg by mouth At Bedtime       triamcinolone (KENALOG) 0.5 % OINT ointment Apply to affected area twice a day, when rash is active 30 g 0         DISPOSITION      Completed phone screen with patient. Scheduled AGE with YAZAN Downey CNP.    Aleta Krause,

## 2018-12-10 ENCOUNTER — HOSPITAL ENCOUNTER (EMERGENCY)
Facility: CLINIC | Age: 78
Discharge: HOME | End: 2018-12-11
Attending: EMERGENCY MEDICINE | Admitting: EMERGENCY MEDICINE
Payer: MEDICARE

## 2018-12-10 DIAGNOSIS — G44.219 EPISODIC TENSION-TYPE HEADACHE, NOT INTRACTABLE: ICD-10-CM

## 2018-12-10 DIAGNOSIS — I10 HYPERTENSION, UNSPECIFIED TYPE: ICD-10-CM

## 2018-12-10 PROCEDURE — 96374 THER/PROPH/DIAG INJ IV PUSH: CPT

## 2018-12-10 PROCEDURE — 93010 ELECTROCARDIOGRAM REPORT: CPT | Mod: Z6 | Performed by: EMERGENCY MEDICINE

## 2018-12-10 PROCEDURE — 99285 EMERGENCY DEPT VISIT HI MDM: CPT | Mod: 25 | Performed by: EMERGENCY MEDICINE

## 2018-12-10 PROCEDURE — 93005 ELECTROCARDIOGRAM TRACING: CPT

## 2018-12-10 PROCEDURE — 99285 EMERGENCY DEPT VISIT HI MDM: CPT | Mod: 25

## 2018-12-10 RX ORDER — LORAZEPAM 2 MG/ML
0.5 INJECTION INTRAMUSCULAR ONCE
Status: COMPLETED | OUTPATIENT
Start: 2018-12-10 | End: 2018-12-11

## 2018-12-10 ASSESSMENT — ENCOUNTER SYMPTOMS
SHORTNESS OF BREATH: 0
HEADACHES: 1
NAUSEA: 0

## 2018-12-10 ASSESSMENT — MIFFLIN-ST. JEOR: SCORE: 962.63

## 2018-12-10 NOTE — ED AVS SNAPSHOT
[facility], [place], Emergency Department  [address] [place] [id number]  Phone:  [phone]                                    Alena Coronel   MRN: [id number]    Department:  [facility], Emergency Department   Date of Visit:  12/10/2018           After Visit Summary Signature Page    I have received my discharge instructions, and my questions have been answered. I have discussed any challenges I see with this plan with the nurse or doctor.    ..........................................................................................................................................  Patient/Patient Representative Signature      ..........................................................................................................................................  Patient Representative Print Name and Relationship to Patient    ..................................................               ................................................  Date                                   Time    ..........................................................................................................................................  Reviewed by Signature/Title    ...................................................              ..............................................  Date                                               Time          22EPIC Rev 08/18

## 2018-12-11 ENCOUNTER — APPOINTMENT (OUTPATIENT)
Dept: CT IMAGING | Facility: CLINIC | Age: 78
End: 2018-12-11
Attending: EMERGENCY MEDICINE
Payer: MEDICARE

## 2018-12-11 ENCOUNTER — APPOINTMENT (OUTPATIENT)
Dept: GENERAL RADIOLOGY | Facility: CLINIC | Age: 78
End: 2018-12-11
Attending: EMERGENCY MEDICINE
Payer: MEDICARE

## 2018-12-11 ENCOUNTER — OFFICE VISIT (OUTPATIENT)
Dept: INTERNAL MEDICINE | Facility: CLINIC | Age: 78
End: 2018-12-11
Payer: COMMERCIAL

## 2018-12-11 VITALS
TEMPERATURE: 97.5 F | BODY MASS INDEX: 28.48 KG/M2 | OXYGEN SATURATION: 98 % | RESPIRATION RATE: 21 BRPM | WEIGHT: 132 LBS | HEIGHT: 57 IN | DIASTOLIC BLOOD PRESSURE: 70 MMHG | HEART RATE: 70 BPM | SYSTOLIC BLOOD PRESSURE: 169 MMHG

## 2018-12-11 VITALS
BODY MASS INDEX: 28.54 KG/M2 | RESPIRATION RATE: 18 BRPM | HEART RATE: 66 BPM | WEIGHT: 131.9 LBS | SYSTOLIC BLOOD PRESSURE: 132 MMHG | DIASTOLIC BLOOD PRESSURE: 78 MMHG | TEMPERATURE: 97.9 F

## 2018-12-11 DIAGNOSIS — I10 BENIGN ESSENTIAL HYPERTENSION: Primary | ICD-10-CM

## 2018-12-11 DIAGNOSIS — K62.9 ANUS PROBLEMS: ICD-10-CM

## 2018-12-11 DIAGNOSIS — K21.9 GASTROESOPHAGEAL REFLUX DISEASE WITHOUT ESOPHAGITIS: ICD-10-CM

## 2018-12-11 DIAGNOSIS — R19.8 ANAL DISCHARGE: ICD-10-CM

## 2018-12-11 LAB
ALBUMIN SERPL-MCNC: 3.4 G/DL (ref 3.4–5)
ALBUMIN UR-MCNC: NEGATIVE MG/DL
ALP SERPL-CCNC: 44 U/L (ref 40–150)
ALT SERPL W P-5'-P-CCNC: 22 U/L (ref 0–50)
ANION GAP SERPL CALCULATED.3IONS-SCNC: 6 MMOL/L (ref 3–14)
APPEARANCE UR: CLEAR
AST SERPL W P-5'-P-CCNC: 24 U/L (ref 0–45)
BASOPHILS # BLD AUTO: 0 10E9/L (ref 0–0.2)
BASOPHILS NFR BLD AUTO: 0.5 %
BILIRUB SERPL-MCNC: 0.2 MG/DL (ref 0.2–1.3)
BILIRUB UR QL STRIP: NEGATIVE
BUN SERPL-MCNC: 10 MG/DL (ref 7–30)
CALCIUM SERPL-MCNC: 8.8 MG/DL (ref 8.5–10.1)
CHLORIDE SERPL-SCNC: 105 MMOL/L (ref 94–109)
CO2 SERPL-SCNC: 26 MMOL/L (ref 20–32)
COLOR UR AUTO: ABNORMAL
CREAT SERPL-MCNC: 0.94 MG/DL (ref 0.52–1.04)
DIFFERENTIAL METHOD BLD: NORMAL
EOSINOPHIL # BLD AUTO: 0.2 10E9/L (ref 0–0.7)
EOSINOPHIL NFR BLD AUTO: 2.5 %
ERYTHROCYTE [DISTWIDTH] IN BLOOD BY AUTOMATED COUNT: 13.9 % (ref 10–15)
GFR SERPL CREATININE-BSD FRML MDRD: 58 ML/MIN/1.7M2
GLUCOSE SERPL-MCNC: 102 MG/DL (ref 70–99)
GLUCOSE UR STRIP-MCNC: NEGATIVE MG/DL
HCT VFR BLD AUTO: 38.4 % (ref 35–47)
HGB BLD-MCNC: 12.6 G/DL (ref 11.7–15.7)
HGB UR QL STRIP: NEGATIVE
IMM GRANULOCYTES # BLD: 0 10E9/L (ref 0–0.4)
IMM GRANULOCYTES NFR BLD: 0.2 %
INTERPRETATION ECG - MUSE: NORMAL
KETONES UR STRIP-MCNC: NEGATIVE MG/DL
LEUKOCYTE ESTERASE UR QL STRIP: NEGATIVE
LYMPHOCYTES # BLD AUTO: 1.9 10E9/L (ref 0.8–5.3)
LYMPHOCYTES NFR BLD AUTO: 30.4 %
MCH RBC QN AUTO: 27.8 PG (ref 26.5–33)
MCHC RBC AUTO-ENTMCNC: 32.8 G/DL (ref 31.5–36.5)
MCV RBC AUTO: 85 FL (ref 78–100)
MONOCYTES # BLD AUTO: 0.5 10E9/L (ref 0–1.3)
MONOCYTES NFR BLD AUTO: 7.7 %
NEUTROPHILS # BLD AUTO: 3.7 10E9/L (ref 1.6–8.3)
NEUTROPHILS NFR BLD AUTO: 58.7 %
NITRATE UR QL: NEGATIVE
NRBC # BLD AUTO: 0 10*3/UL
NRBC BLD AUTO-RTO: 0 /100
PH UR STRIP: 6.5 PH (ref 5–7)
PLATELET # BLD AUTO: 234 10E9/L (ref 150–450)
POTASSIUM SERPL-SCNC: 3.6 MMOL/L (ref 3.4–5.3)
PROT SERPL-MCNC: 7.8 G/DL (ref 6.8–8.8)
RBC # BLD AUTO: 4.54 10E12/L (ref 3.8–5.2)
SODIUM SERPL-SCNC: 137 MMOL/L (ref 133–144)
SOURCE: ABNORMAL
SP GR UR STRIP: 1 (ref 1–1.03)
TROPONIN I SERPL-MCNC: 0.02 UG/L (ref 0–0.04)
TSH SERPL DL<=0.005 MIU/L-ACNC: 3.32 MU/L (ref 0.4–4)
UROBILINOGEN UR STRIP-MCNC: NORMAL MG/DL (ref 0–2)
WBC # BLD AUTO: 6.3 10E9/L (ref 4–11)

## 2018-12-11 PROCEDURE — 71045 X-RAY EXAM CHEST 1 VIEW: CPT

## 2018-12-11 PROCEDURE — 70450 CT HEAD/BRAIN W/O DYE: CPT

## 2018-12-11 PROCEDURE — 84484 ASSAY OF TROPONIN QUANT: CPT | Performed by: EMERGENCY MEDICINE

## 2018-12-11 PROCEDURE — 96374 THER/PROPH/DIAG INJ IV PUSH: CPT

## 2018-12-11 PROCEDURE — 81003 URINALYSIS AUTO W/O SCOPE: CPT | Performed by: EMERGENCY MEDICINE

## 2018-12-11 PROCEDURE — 84443 ASSAY THYROID STIM HORMONE: CPT | Performed by: EMERGENCY MEDICINE

## 2018-12-11 PROCEDURE — 25000128 H RX IP 250 OP 636: Performed by: EMERGENCY MEDICINE

## 2018-12-11 PROCEDURE — 80053 COMPREHEN METABOLIC PANEL: CPT | Performed by: EMERGENCY MEDICINE

## 2018-12-11 PROCEDURE — 85025 COMPLETE CBC W/AUTO DIFF WBC: CPT | Performed by: EMERGENCY MEDICINE

## 2018-12-11 RX ORDER — METOPROLOL SUCCINATE 25 MG/1
25 TABLET, EXTENDED RELEASE ORAL DAILY
Qty: 90 TABLET | Refills: 3 | Status: SHIPPED | OUTPATIENT
Start: 2018-12-11 | End: 2019-08-09

## 2018-12-11 RX ORDER — ESOMEPRAZOLE MAGNESIUM 40 MG/1
40 CAPSULE, DELAYED RELEASE ORAL
Qty: 90 CAPSULE | Refills: 3 | Status: SHIPPED | OUTPATIENT
Start: 2018-12-11 | End: 2019-09-19

## 2018-12-11 RX ADMIN — LORAZEPAM 0.5 MG: 2 INJECTION, SOLUTION INTRAMUSCULAR; INTRAVENOUS at 00:39

## 2018-12-11 ASSESSMENT — PAIN SCALES - GENERAL: PAINLEVEL: NO PAIN (0)

## 2018-12-11 NOTE — ED TRIAGE NOTES
"Patient presents to triage c/o hypertension and a \"foggy\" feeling in her head. Denies a headache but unable to describe the sensation she is feeling. Blood pressure at home was 200/70. BP now 189/78 at triage. Patient was taken off of hydrochlorothiazide approximately one year ago.  "

## 2018-12-11 NOTE — NURSING NOTE
Chief Complaint   Patient presents with     ER F/U     Patient is here for ER follow up; hypertension       Osiel Coker CMA (AAMA) at 1:21 PM on 12/11/2018

## 2018-12-11 NOTE — ED PROVIDER NOTES
"  History     Chief Complaint   Patient presents with     Hypertension     HPI  Alena Coronel is a 78 year old female with a history of HLD, HTN, hypokalemia, anxiety, and palpitations who presents for evaluation of a headache. Patient reports onset of a diffuse, throbbing headache around 10 PM tonight. She denies chest pain but states her heart feels \"weak\". Patient also denies shortness of breath or nausea. Patient checked her blood pressure tonight and was hypertensive, 200/70, PTA. She admits she stopped taking her HTN medications for the past year. Patient has taken some trazodone tonight, but nothing for her pain.    Past Medical History:   Diagnosis Date     Adjustment disorder with mixed anxiety and depressed mood      Anxiety      Chronic cough      Chronic neck pain 2010     Chronic pruritus      Digestive problems      Dysphagia      GERD (gastroesophageal reflux disease)      Hyperlipidemia LDL goal <130 2010     Hypertension goal BP (blood pressure) < 140/90      Hypokalemia      Insomnia      Intestinal disaccharidase deficiencies and disaccharide malabsorption      Localized osteoarthrosis not specified whether primary or secondary, hand      Low back pain with bilateral sciatica      Osteopenia      Osteoporosis, unspecified      Palpitations      Urinary incontinence        Past Surgical History:   Procedure Laterality Date     BIOPSY OF BREAST, INCISIONAL       C APPENDECTOMY       C LIGATE FALLOPIAN TUBE       COLONOSCOPY  2008       Family History   Problem Relation Age of Onset     Other - See Comments Mother          during childbirth     Gastrointestinal Disease Father         Stomach/Liver Problems     Other - See Comments Sister          in [facility], presented as bad headache     Mental Illness Sister         ?schizophrenia, paranoid     Vision Loss Sister      Mental Illness Brother      Diabetes Sister      Glaucoma No family hx of      Macular " "Degeneration No family hx of        Social History     Tobacco Use     Smoking status: Never Smoker     Smokeless tobacco: Never Used     Tobacco comment: smoke free household.   Substance Use Topics     Alcohol use: Yes     Comment: up to 3 glasses after dinner every week       No current facility-administered medications for this encounter.      Current Outpatient Medications   Medication     Acetaminophen (TYLENOL PO)     albuterol (PROAIR RESPICLICK) 108 (90 Base) MCG/ACT inhaler     Albuterol (VENTOLIN IN)     Alendronate Sodium 70 MG TBEF     atorvastatin (LIPITOR) 40 MG tablet     Biotin 2500 MCG CAPS     Blood Pressure Monitor KIT     Calcium Carbonate-Vit D-Min (CALCIUM 1200 PO)     Cholecalciferol (VITAMIN D) 1000 UNITS capsule     Coenzyme Q10 (COQ10) 200 MG CAPS     CVS OMEGA-3 KRILL  MG CAPS     Flaxseed, Linseed, POWD     fluocinonide (LIDEX) 0.05 % ointment     fluticasone (FLONASE) 50 MCG/ACT nasal spray     ibuprofen (ADVIL/MOTRIN) 200 MG capsule     mometasone furoate (ASMANEX HFA) 200 MCG/ACT inhaler     Omega-3 Fatty Acids ([employer]S NATURAL FISH OIL) 1000 MG CAPS     TRAZODONE HCL PO     triamcinolone (KENALOG) 0.5 % OINT ointment        Allergies   Allergen Reactions     Penicillins Rash     Shellfish-Derived Products Itching and Rash     Sulfacetamide Rash       I have reviewed the Medications, Allergies, Past Medical and Surgical History, and Social History in the Epic system.    Review of Systems   Respiratory: Negative for shortness of breath.    Cardiovascular: Negative for chest pain.   Gastrointestinal: Negative for nausea.   Neurological: Positive for headaches.   All other systems reviewed and are negative.      Physical Exam   BP: 189/78  Pulse: 70  Heart Rate: 55  Temp: 97.5  F (36.4  C)  Resp: 18  Height: 144.8 cm (4' 9\")  Weight: 59.9 kg (132 lb)  SpO2: 96 %      Physical Exam   Constitutional: She is oriented to person, place, and time. She is cooperative.  Non-toxic appearance. " She does not have a sickly appearance. She does not appear ill. No distress.   HENT:   Head: Normocephalic and atraumatic.   Eyes: Conjunctivae and EOM are normal. Pupils are equal, round, and reactive to light. Right eye exhibits normal extraocular motion. Left eye exhibits normal extraocular motion. Right pupil is reactive. Left pupil is reactive.   Neck: Normal range of motion. Neck supple. No Brudzinski's sign and no Kernig's sign noted.   Cardiovascular: Normal rate and regular rhythm. Exam reveals no gallop and no friction rub.   No murmur heard.  Pulmonary/Chest: Effort normal and breath sounds normal. No respiratory distress.   Abdominal: Soft. There is no tenderness.   Musculoskeletal: She exhibits no edema or tenderness.   Neurological: She is alert and oriented to person, place, and time. She is not disoriented. No cranial nerve deficit. GCS eye subscore is 4. GCS verbal subscore is 5. GCS motor subscore is 6.   Skin: Skin is warm.   Psychiatric: She has a normal mood and affect. Her behavior is normal. Judgment and thought content normal.   Nursing note and vitals reviewed.      ED Course   Dictation Disclaimer: These note that this medical chart was completed with an electronic voice recognition dictation system ([email or web address]).  Even though efforts have been made to review and edit this chart, it is possible that some warts may have been transcribed by error in my dictation.  This is simply due to normal deficiencies and variance that may happen while using a voice recognition dictation.    Based on history physical examination and presentation I plan to evaluate for evidence of endorgan damage due to hypertension.  Clinically I feel that her headache is not significant and does not appear like something that would need lumbar puncture and/or MRI I do not suspect that this patient has subarachnoid hemorrhage.  Once the workup is completed patient will be disposition patient and daughter agree  ED Course as of  Dec 11 0142   Tue Dec 11, 2018   0130 Troponin I ES: 0.020   0131 Troponin I ES: 0.020     Procedures   None  11:43 PM  The patient was seen and examined by Dr. Randall in Room 19.          EKG Interpretation:      Interpreted by Jose Randall  Time reviewed: 0200 hrs.  Symptoms at time of EKG: None   Rhythm: sinus bradycardia  Rate: 59 bpm  Axis: Normal  Ectopy: none  Conduction: normal  ST Segments/ T Waves: No acute ischemic changes  Q Waves: none  Comparison to prior: Unchanged from 17 February 2004    Clinical Impression: no acute changes                Critical Care time:  none             Labs Ordered and Resulted from Time of ED Arrival Up to the Time of Departure from the ED   COMPREHENSIVE METABOLIC PANEL - Abnormal; Notable for the following components:       Result Value    Glucose 102 (*)     GFR Estimate 58 (*)     All other components within normal limits   UA MACROSCOPIC WITH REFLEX TO MICRO AND CULTURE - Abnormal; Notable for the following components:    Specific Gravity Urine 1.002 (*)     All other components within normal limits   CBC WITH PLATELETS DIFFERENTIAL   TROPONIN I   TSH   PERIPHERAL IV CATHETER   CARDIAC CONTINUOUS MONITORING       Assessments & Plan (with Medical Decision Making)   78-year-old female with a past medical history of hypertension comes in for evaluation of high blood pressure.  Patient says that she had stopped taking blood pressure medicine due to the fact that the blood pressure was controlled and is been about a years that she is taking her medicine however today she noted the blood pressure was high and she began to get a mild headache have some mild dizziness.  Physical exam was reassuring except for the fact that she did have a high blood pressure and she was bradycardic.  Evaluation of previous EKGs show that patient does have a baseline bradycardia that goes anywhere from the 50 bpm to about 60 bpm.  Initial labs as well as chest x-ray and CT scan are all  within normal limits based on this I plan to discharge this patient to home with follow-up I will patient follow-up with her primary care doctor to determine whether she needs to be restarted on antihypertensives or not.  Patient categorically denies any chest pain or shortness of breath, and to discharged home with follow-up.  Patient and daughter agree    I have reviewed the nursing notes.    I have reviewed the findings, diagnosis, plan and need for follow up with the patient.    Current Discharge Medication List          Final diagnoses:   Hypertension, unspecified type   Episodic tension-type headache, not intractable   I, Pipo Arambula, am serving as a trained medical scribe to document services personally performed by Jose Randall MD, based on the provider's statements to me.   IJose MD, was physically present and have reviewed and verified the accuracy of this note documented by Pipo Arambula.      12/10/2018   [facility], [place], EMERGENCY DEPARTMENT     Jose Randall MD  12/11/18 [id number]

## 2018-12-11 NOTE — DISCHARGE INSTRUCTIONS
It is very important that you follow-up with your primary care doctor in 2 days to make sure that you are getting better and to see if any other test of treatments will be needed.  Please avoid any activity that causes pain.  If fevers nausea vomiting worse symptoms, or if any other concerns develop, please return to the emergency department immediately.  When patients present with symptoms like years follow-up is very important due to the fact that often times a single evaluation in the ER is not sufficient to accurately diagnose the cause of your symptoms.    Please make sure to follow-up with your primary care doctor to see if you need to be restarted on high blood pressure medications please also make sure to repeat your blood pressure this once a day and to keep track of the number so that you may take it to your primary care doctor's office to help your doctor decide whether you need medication for high blood pressure or not.    Please make an appointment to follow up with [facility] (phone: [phone] as soon as possible even if entirely better.

## 2018-12-19 NOTE — PROGRESS NOTES
Alena was seen today for er f/u and gastrointestinal problem.  Total time spent 25 minutes.  More than 50% of the time spent with Ms. Coronel on counseling / coordinating her care and reviewing ER notes with the patient in [facility].   She was seen for a headache and noted to be hypertensive after stopping her usual medications.  She started metoprolol with a good response; I refilled a 90 day supply with three refills today.  She also needs a refill of PPI.    Meanwhile, she reports ongoing problems with anal discharge, a clear to white mucous both before and after bowel movements.  No rectal pain or tenesmus, no change in stool pattern.  Had a recent colonoscopy, 6 months ago which showed a few adenomatous polyps and will need 5 year follow up.    /78 (BP Location: Left arm, Patient Position: Sitting, Cuff Size: Adult Regular)   Pulse 66   Temp 97.9  F (36.6  C) (Oral)   Resp 18   Wt 59.8 kg (131 lb 14.4 oz)   Breastfeeding? No   BMI 28.54 kg/m     Cor RRR without m/r/g  Lungs CTA  Ext no edema      A/P    Benign essential hypertension  -     metoprolol succinate ER (TOPROL-XL) 25 MG 24 hr tablet; Take 1 tablet (25 mg) by mouth daily    Gastroesophageal reflux disease without esophagitis  -     esomeprazole (NEXIUM) 40 MG DR capsule; Take 1 capsule (40 mg) by mouth every morning (before breakfast) Take 30-60 minutes before eating.    Anal discharge  Anus problems  -     GASTROENTEROLOGY ADULT REFERRAL    RTC as previously scheduled with Dr. Cheryl Clemente MD

## 2018-12-28 ENCOUNTER — DOCUMENTATION ONLY (OUTPATIENT)
Dept: INTERNAL MEDICINE | Facility: CLINIC | Age: 78
End: 2018-12-28

## 2019-01-03 ENCOUNTER — OFFICE VISIT (OUTPATIENT)
Dept: INTERNAL MEDICINE | Facility: CLINIC | Age: 79
End: 2019-01-03
Payer: COMMERCIAL

## 2019-01-03 VITALS
BODY MASS INDEX: 28.69 KG/M2 | WEIGHT: 132.6 LBS | OXYGEN SATURATION: 98 % | HEART RATE: 55 BPM | DIASTOLIC BLOOD PRESSURE: 66 MMHG | SYSTOLIC BLOOD PRESSURE: 117 MMHG

## 2019-01-03 DIAGNOSIS — K58.2 IRRITABLE BOWEL SYNDROME WITH BOTH CONSTIPATION AND DIARRHEA: Primary | ICD-10-CM

## 2019-01-03 DIAGNOSIS — F41.9 ANXIETY: ICD-10-CM

## 2019-01-03 DIAGNOSIS — R68.89 MULTIPLE SOMATIC COMPLAINTS: ICD-10-CM

## 2019-01-03 DIAGNOSIS — R21 RASH: ICD-10-CM

## 2019-01-03 RX ORDER — MUPIROCIN 20 MG/G
OINTMENT TOPICAL 3 TIMES DAILY
Qty: 30 G | Refills: 1 | Status: SHIPPED | OUTPATIENT
Start: 2019-01-03 | End: 2019-01-10

## 2019-01-03 NOTE — PROGRESS NOTES
CC: Here for f/u    HPI:  Reviewed ED and Dr. Clemente's notes with patient  Some language barrier today  Now back on medication for hypertension  Did not take esomeprazole every other but stopped it and started taking it every other day due to stools too soft  Also used some Maalox  Still having some upper abdominal pain, has upcoming GI appt in March 4, but does not want to wait that long--see additional history below  Not taking anything else for pain besides PPI which helps some  No vomiting, bu thas some nausea (minor), no weight loss, appetite fine, no dysphagia, gets full easily, no fevers  BM's does have alternating soft versus constipation, releived by BM, colonoscopy up to date, was prescribed Metamucil in the past, stopped because she gets mucus, no blood in bowel movement.  Does have Miralax and it helps with abd pain, avoids using too much because it can cause soft stools, discussed titrating it according to BM consistency  Does have dairy products and I advised avoiding them to see if that helps  Otherwise has stir baeza, several vegetables and fruits white rice (not much), meats--occasionally has beef, fish, seafood (not much), has some snacks including carrot containing carrots, almonds  Has oatmeal every day with umeric powder  Plenty of fluids  Worried about colon   Also has small rash on lower back and gluteal cleft, offand on for a few months, itchy, , not responding to fluocinonide.  Will try bactroban.  Worried about report of bone cyst, but no evidence in our records.  Report says it is benign.  Reassured  Appointment pending in psychiatry on 2/12/18, has not made an appt with psychology yet.  Neck pain relieved by massage and coining    Saw Dr. Clemente 12/11/18 for GI complaints and hypertension  Was seen in ED 12/10/18 for hypertension.  Patient had stopped her medications prior to presentation. No evidence of end organ damage.  Refilled/restarted metoprolol  Refilled PPI for GERD  Anal  discharge, colonoscopy 6 months ago with few adenomatous polyps  Referred to GI    Saw me 11/28/18 for routine physical  Referred to psychiatrist for adjustment disorder with anxiety and depressed mood, also refilled trazodone  Advised f/u one month for recheck BP  Started atorvastatin and ASA  Continued alendronate  Advised reflux precautions  Refilled asthma rx's    Established care with me 10/29/18  Tingling  Addressed eczema and chronic pruritus  Referred to psychology regarding adjustment disorder with anxious mood, insomnia, continued trazodone  F/U 4 weeks    Patient Active Problem List   Diagnosis     Generalized osteoarthrosis, unspecified site     Irritable bowel syndrome     Adjustment disorder with mixed anxiety and depressed mood     Insomnia     Eczema     Hypertension goal BP (blood pressure) < 140/90     Hyperlipidemia LDL goal <130     Chronic neck pain     Hypokalemia     Osteoporosis     Chest pain     Idiopathic thrombocytopenia (H)     RUQ abdominal pain     Immune to hepatitis A     Dysphagia     GERD (gastroesophageal reflux disease)     Advanced directives, counseling/discussion     Macular drusen     PVD (posterior vitreous detachment)     Venous stasis     Anxiety     Cataract     TMJ (temporomandibular joint syndrome)     Urinary incontinence     Chronic cough     Digestive problems     Lumbago     Low back pain with bilateral sciatica     Palpitations     Chronic pruritus     Disorder of bone and cartilage     Current Outpatient Medications   Medication Sig Dispense Refill     Acetaminophen (TYLENOL PO) Take 325 mg by mouth every 4 hours as needed for mild pain or fever       albuterol (PROAIR RESPICLICK) 108 (90 Base) MCG/ACT inhaler Inhale 2 puffs into the lungs every 6 hours as needed for shortness of breath / dyspnea or wheezing 1 Inhaler 1     Albuterol (VENTOLIN IN) 2-4 puffs every 4-6 hours as needed.Shannon Dao LPN 3:39 PM on 10/29/2018       Alendronate Sodium 70 MG TBEF Take  70 mg by mouth every 7 days 12 tablet 3     atorvastatin (LIPITOR) 40 MG tablet Take 1 tablet (40 mg) by mouth daily 90 tablet 3     Biotin 2500 MCG CAPS Take 5,000 mcg/day by mouth       Blood Pressure Monitor KIT 1 Device daily 1 kit 1     Calcium Carbonate-Vit D-Min (CALCIUM 1200 PO) Take 1,000 Units by mouth 1 tab daily       Cholecalciferol (VITAMIN D) 1000 UNITS capsule Take 5,000 Units by mouth daily        Coenzyme Q10 (COQ10) 200 MG CAPS Take 200 mg by mouth daily       [employer] OMEGA-3 KRILL  MG CAPS Take  by mouth.       esomeprazole (NEXIUM) 40 MG DR capsule Take 1 capsule (40 mg) by mouth every morning (before breakfast) Take 30-60 minutes before eating. 90 capsule 3     Flaxseed, Linseed, POWD Take 1-2 tsp by mouth daily.       fluocinonide (LIDEX) 0.05 % ointment Apply topically 2 times daily       fluticasone (FLONASE) 50 MCG/ACT nasal spray Spray 1 spray into both nostrils daily 1 Bottle 3     ibuprofen (ADVIL/MOTRIN) 200 MG capsule Take 200 mg by mouth every 4 hours as needed for fever       metoprolol succinate ER (TOPROL-XL) 25 MG 24 hr tablet Take 1 tablet (25 mg) by mouth daily 90 tablet 3     mometasone furoate (ASMANEX HFA) 200 MCG/ACT inhaler Inhale 2 puffs into the lungs 2 times daily 3 Inhaler 3     mupirocin (BACTROBAN) 2 % external ointment Apply topically 3 times daily for 7 days 30 g 1     Omega-3 Fatty Acids ([facility] NATURAL FISH OIL) 1000 MG CAPS Take 1,400 mg by mouth daily       TRAZODONE HCL PO Take 100 mg by mouth At Bedtime       triamcinolone (KENALOG) 0.5 % OINT ointment Apply to affected area twice a day, when rash is active 30 g 0     Allergies   Allergen Reactions     Penicillins Rash     Shellfish-Derived Products Itching and Rash     Sulfacetamide Rash     /66   Pulse 55   Wt 60.1 kg (132 lb 9.6 oz)   SpO2 98%   BMI 28.69 kg/m    Physical Examination:    General:  Conversant, generally healthy appearing, no acute distress  Head: atraumatic    Abdomen:  Not  distended.  Bowel sounds active.  Minimal, diffuse, deep palpation tenderness w/o rebound or guarding, no hepatosplenomegaly or masses.  No CVA tenderness or masses.  Skin:   Normal temperature., turgor and texture. Scattered brown-pink papules up to 4 mm, no purulence or cellulits, no vesicles, no suspicious lesions,  jaundice or ulcers    Psych:  Alert and oriented to person, place and time. Anxious about multiple somatic complaints.  Not psychomotor slowed.      Alena was seen today for recheck medication, abdominal pain and derm problem.    Diagnoses and all orders for this visit:    Irritable bowel syndrome with both constipation and diarrhea  Info given on IBS.  Avoid dairy products  Keep upcoming GI appt    Rash  -     mupirocin (BACTROBAN) 2 % external ointment; Apply topically 3 times daily for 7 days    Multiple somatic complaints    Anxiety  Keep upcoming psychiatry appt  Previously ordered, but still needs to schedule psychology appt    Total time spent 40 minutes.  More than 50% of the time spent with Ms. Coronel on counseling / coordinating her care    F/U 6 months.    Ling Luna M.D.  Internal Medicine  [facility]   pager [phone]

## 2019-01-03 NOTE — NURSING NOTE
Chief Complaint   Patient presents with     Recheck Medication     pt is here for a follow up regarding blood pressure medication (metoprolol)     Abdominal Pain     Pt c/o of left abdominal pain and rib pain     Derm Problem     rash on right buttock       Krupa Moran, Clinic EMT at 7:22 AM on 1/3/2019

## 2019-01-03 NOTE — PATIENT INSTRUCTIONS
[facility] Phone Number: [phone]   [facility] [place] [facility] Medication Refill Request Information:  * Please contact your pharmacy regarding ANY request for medication refills.  ** [facility] Prescription Fax = [phone]  * Please allow 3 business days for routine medication refills.  * Please allow 5 business days for controlled substance medication refills.     [facility] Test Result notification information:  *You will be notified with in 7-10 days of your appointment day regarding the results of your test.  If you are on MyChart you will be notified as soon as the provider has reviewed the results and signed off on them.    Avoid dairy products  Apply bactroban (mupirocin) to rash, stop flocinonide gel/cream  Here is some general information about irritable bowel syndrome:    Patient information: Irritable bowel syndrome (Beyond the Basics)   Author  Dimas Rich MD  Section   Srinivas Wick MD, PhD  [place]   Valerie Fuchs MD, MPH  All topics are updated as new evidence becomes available and our peer review process is complete.   Literature review current through: May 2015.  This topic last updated: Nov 25, 2013.   IRRITABLE BOWEL SYNDROME OVERVIEW -- Irritable bowel syndrome (IBS) is a chronic condition of the digestive system. Its primary symptoms are abdominal pain and altered bowel habits (eg, constipation and/or diarrhea), but these symptoms have no identifiable cause.  IBS is the most commonly diagnosed gastrointestinal condition and is second only to the common cold as a cause of absence from work. An estimated 10 to 20 percent of people in the general population experience symptoms of IBS, although only about 15 percent of affected people actually seek medical help.  Several treatments and therapies are available for irritable bowel syndrome. These measures help alleviate symptoms, but do not cure the condition. The chronic nature of irritable bowel syndrome and the  "challenge of controlling its symptoms can be frustrating for both patients and healthcare providers.  IRRITABLE BOWEL SYNDROME CAUSES -- There are a number of theories about how and why irritable bowel syndrome (IBS) develops. Despite intensive research, the cause is not clear.  ?One theory suggests that irritable bowel syndrome is caused by abnormal contractions of the colon and intestines (hence the term \"spastic bowel,\" which has sometimes been used to describe irritable bowel syndrome). Vigorous contractions of the intestines can cause severe cramps, providing the rationale for some of the treatments of IBS, such as antispasmodics and fiber (both of which help to regulate the contractions of the colon). However, abnormal contractions do not explain irritable bowel syndrome in all patients, and it is unclear whether the contractions are a symptom or cause of the disorder.  ?Some people develop irritable bowel syndrome after a severe gastrointestinal infection (eg, Salmonella or Campylobacter, or viruses). It is not clear how the infection triggers IBS to develop, and most people with irritable bowel syndrome do not have a history of these infections.  ?People with irritable bowel syndrome who seek medical help are more likely to suffer from anxiety and stress than those who do not seek help. Stress and anxiety are known to affect the intestine; thus, it is likely that anxiety and stress worsen symptoms. However, stress or anxiety is probably not the cause. Some studies have suggested that irritable bowel syndrome is more common in people who have a history of physical, verbal, or sexual abuse.  ?Food intolerances are common in patients with irritable bowel syndrome, raising the possibility that it is caused by food sensitivity or allergy. This theory has been difficult to prove, although it continues to be studied. The best way to detect an association between symptoms of irritable bowel syndrome and food " "sensitivity is to eliminate certain food groups systematically (a process called an elimination diet), which should only be considered for patients in the care of a doctor or nutritionist. Eliminating foods without assistance can lead to omission of important sources of nutrition. In addition, unnecessary dietary restrictions can further worsen a person's quality of life.    A number of foods are known to cause symptoms that mimic or aggravate irritable bowel syndrome, including dairy products (which contain lactose), legumes (such as beans), and cruciferous vegetables (such as broccoli, cauliflower, [place] sprouts, and cabbage). These foods increase intestinal gas, which can cause cramps. Several medications also have effects on the intestines that may contribute to symptoms.  ?Many researchers believe that irritable bowel syndrome is caused by heightened sensitivity of the intestines to normal sensations (so-called \"visceral hyperalgesia\"). This theory proposes that nerves in the bowels are overactive in people with irritable bowel syndrome, so that normal amounts of gas or movement are perceived as excessive and painful. Some patients with severe irritable bowel syndrome feel better when treated with medications that decrease pain perception in the intestine (such as low doses of imipramine or nortriptyline) (see 'Antidepressants' below).  SYMPTOMS OF IRRITABLE BOWEL SYNDROME -- Irritable bowel syndrome (IBS) often begins in young adulthood. Women are twice as likely as men to be diagnosed with irritable bowel syndrome in the [place] and other western countries. In other countries, an equal number of men and women are diagnosed with irritable bowel syndrome. The most common symptom of irritable bowel syndrome is abdominal pain in association with changes in bowel habits (diarrhea and/or constipation).  Abdominal pain -- Abdominal pain is typically crampy and varies in intensity. Some people notice that " "emotional stress and eating worsen the pain, and that having a bowel movement relieves the pain. Some women with irritable bowel syndrome notice an association between pain episodes and their menstrual cycle.  Changes in bowel habits -- Altered bowel habits are a second symptom of irritable bowel syndrome. This can include diarrhea, constipation, or alternating diarrhea and constipation. If diarrhea is the more common pattern, the condition is called diarrhea-predominant irritable bowel syndrome; if constipation is more common, the condition is called constipation-dominant irritable bowel syndrome.  Diarrhea -- A person with irritable bowel syndrome may have frequent loose stools. Bowel movements usually occur during the daytime, and most often in the morning or after meals. Diarrhea is often preceded by a sense of extreme urgency and followed by a feeling of incomplete emptying. About one-half of people with IBS also notice mucous discharge with diarrhea. Diarrhea occurring during sleep is very unusual with IBS. (See \"Patient information: Chronic diarrhea in adults (Beyond the Basics)\".)  Constipation -- The constipation of irritable bowel syndrome can be intermittent and last for days. Stools are often hard and pellet-shaped. You may not feel empty after a bowel movement, even when the rectum is empty. This faulty sensation can lead to straining and sitting on the toilet for prolonged periods of time. (See \"Patient information: Constipation in adults (Beyond the Basics)\".)  Other symptoms -- Other symptoms of irritable bowel syndrome include bloating, gas, and belching.  IRRITABLE BOWEL SYNDROME DIAGNOSIS -- Several intestinal disorders have symptoms that are similar to irritable bowel syndrome. Examples include malabsorption (abnormal absorption of nutrients), inflammatory bowel disease (such as ulcerative colitis and Crohn's disease), celiac disease, and microscopic colitis (uncommon diseases associated with " "intestinal inflammation).  Because there is no single diagnostic test for irritable bowel syndrome, many clinicians compare your symptoms to formal sets of diagnostic criteria. However, these criteria are not accurate in distinguishing irritable bowel syndrome from other conditions in everyone. Thus, a medical history, physical examination, and select tests can help to rule out other medical conditions.  Tests -- Most clinicians order routine blood tests in people with suspected irritable bowel syndrome; these tests are usually normal, but they can help rule out other medical conditions.  Some clinicians also order more invasive tests, such as sigmoidoscopy or colonoscopy, especially in people over age 40 years. (See \"Patient information: Colonoscopy (Beyond the Basics)\" and \"Patient information: Flexible sigmoidoscopy (Beyond the Basics)\".)  IRRITABLE BOWEL SYNDROME TREATMENT -- There are a number of different treatments and therapies for irritable bowel syndrome (IBS) [1]. Treatments are often given to reduce the pain and other symptoms of irritable bowel syndrome, and it may be necessary to try more than one combination of treatments to find the one that is most helpful for you. (See \"Treatment of irritable bowel syndrome in adults\".)  Treatment is usually a long-term process; during this process, it is important to communicate with your healthcare provider about symptoms, concerns, and any stressors or home/work/family problems that develop.  Monitor symptoms -- The first step in treating irritable bowel syndrome is usually to monitor symptoms, daily bowel habits, and any other factors that may affect your bowels. This can help to identify factors that worsen symptoms in some people with IBS, such as lactose or other food intolerances and stress. A daily diary can be helpful (form 1).  Diet changes -- It is reasonable to try eliminating foods that may aggravate irritable bowel syndrome, although this should be " "done with the assistance of a healthcare provider. Eliminating foods without assistance can potentially worsen symptoms or cause new problems if important food groups are omitted.  Lactose -- Many clinicians recommend temporarily eliminating milk products since lactose intolerance is common and can aggravate irritable bowel syndrome or cause symptoms similar to IBS. The greatest concentration of lactose is found in milk and ice cream, although it is present in smaller quantities in yogurt, cottage and other cheeses, and any prepared foods that contain these ingredients (table 1).  All lactose containing products should be eliminated for two weeks. If IBS symptoms improve, it is reasonable to continue avoiding lactose. If symptoms do not improve, you may resume eating lactose-containing foods.   Foods that cause gas -- Many foods are only partially digested in the small intestines. When they reach the colon (large intestine), further digestion takes place, which may cause gas and cramps. Eliminating these foods temporarily is reasonable if gas or bloating is bothersome.  The most common gas-producing foods are legumes (such as beans) and cruciferous vegetables (such as cabbage, [place] sprouts, cauliflower, and broccoli). In addition, some people have trouble with onions, celery, carrots, raisins, bananas, apricots, prunes, sprouts, and wheat. (See \"Patient information: Gas and bloating (Beyond the Basics)\".)  Foods that are easier -- The following table provides a list of foods that may be easier to digest in people with IBS (table 2).  Increasing dietary fiber -- Increasing dietary fiber (either by adding certain foods to the diet or using fiber supplements) may relieve symptoms of IBS, particularly if you have constipation (table 3). By reading the product information panel on the side of the package, you can determine the number of grams of fiber per serving (figure 1). Fiber may also be helpful in some people " "with diarrhea predominant symptoms since it can improve the consistency of stools. (See \"Patient information: High-fiber diet (Beyond the Basics)\".)  A bulk-forming fiber supplement (such as psyllium or methylcellulose) may also be recommended to increase fiber intake since it is difficult to consume enough fiber in the diet. Fiber supplements should be started at a low dose and increased slowly over several weeks to reduce the symptoms of excessive intestinal gas, which can occur in some people when beginning fiber therapy.  Fiber can make some people with irritable bowel syndrome more bloated and uncomfortable. If this happens, it is best to decrease fiber intake and consider other laxative treatments for constipation. (See \"Patient information: Constipation in adults (Beyond the Basics)\".)  Psychosocial therapies -- Stress and anxiety can worsen irritable bowel syndrome in some people. The best approach for reducing stress and anxiety depends upon your situation and the severity of your symptoms. Have an open discussion with your clinician about the possible role that stress and anxiety could be having on your symptoms, and together decide upon the best course of action.  ?Some people benefit from formal counseling, with or without antidepressant or antianxiety medications [2]. Other treatments, such as hypnosis and cognitive behavioral therapy may also be helpful. Hypnosis is a state of altered consciousness that allows you to focus away from your anxiety or stress. Patients who are hypnotized are not sleeping, but are actually in a state of heightened imagination, similar to daydreaming. An expert can hypnotize an individual or you can learn self-hypnosis techniques.    Cognitive behavioral therapy helps you to focus on a particular problem in a limited time period. You learn how your thoughts contribute to anxiety or stress and learn how to change these thoughts.  ?Participation in a support group can also be " "valuable.  ?Many patients find that daily exercise is helpful in maintaining a sense of well-being. Exercise can also have favorable effects on the bowels. (See \"Patient information: Exercise (Beyond the Basics)\".)  Irritable bowel syndrome medications -- Although many drugs are available to treat the symptoms of irritable bowel syndrome, these drugs do not cure the condition. They are primarily used to relieve symptoms. The choice among these medications depends in part upon whether you have diarrhea, constipation, or pain- predominant irritable bowel syndrome.  As a general rule, medications are reserved for people whose symptoms have not adequately responded to more conservative measures such as changes in diet and fiber supplements.  Anticholinergic medications -- Anticholinergic drugs block the nervous system's stimulation of the gastrointestinal tract, helping to reduce severe cramping and irregular contractions of the colon.  Drugs in this category include dicyclomine (Bentyl) and hyoscyamine (Levsin). These drugs may be particularly helpful when taken preventively (ie, before symptoms) and thus are most helpful if you can predict the onset of your symptoms. Common side-effects include dry mouth and eyes and blurred vision.  Antidepressants -- Many tricyclic agents (TCAs) have a pain relieving effect in people with irritable bowel syndrome. The dose of TCAs is typically much lower than that used for treating depression. It is believed that these drugs reduce pain perception when used in low doses, although the exact mechanism of their benefit is unknown.  TCAs commonly used for pain management include amitriptyline, imipramine, desipramine, and nortriptyline. It is common to experience fatigue when starting a TCA; this is not always an undesirable side effect since it can help improve sleep when TCAs are taken in the evening. TCAs are generally started in low doses and increased gradually. Their full effect " "may not be seen for three to four weeks.  TCAs also slow movement of contents through the gastrointestinal tract and may be most helpful in people with diarrhea-predominant irritable bowel syndrome.  Another class of antidepressants, the selective serotonin reuptake inhibitors, may be recommended if you have both irritable bowel syndrome and depression. Common SSRIs include fluoxetine (Prozac), sertraline (Zoloft), paroxetine (Paxil), citalopram (Celexa), and escitalopram (Lexapro) Other antidepressant medications that may be recommended include mirtazapine (Remeron), venlafaxine (Effexor), and duloxetine (Cymbalta). (See \"Patient information: Depression treatment options for adults (Beyond the Basics)\".)  Antidiarrheal drugs -- The drugs loperamide (Imodium) or diphenoxylate with atropine (Lomotil) can help slow the movement of stool through the digestive tract. Loperamide and diphenoxylate/atropine are most helpful if you have diarrhea-predominant irritable bowel syndrome. However, clinicians usually recommend that these drugs should only be used as needed rather than on a continuous basis.  Anti-anxiety drugs -- Anti-anxiety drugs reduce anxiety. Diazepam (Valium), lorazepam (Ativan), and clonazepam (Klonopin) belong to this class of drugs. Anti-anxiety drugs are occasionally prescribed for people with short-term anxiety that is worsening their irritable bowel syndrome symptoms. However, these drugs should only be taken for short periods of time since they can be addictive.  Alosetron -- Alosetron (Lotronex) blocks a hormone that is involved in intestinal contractions and sensations. It is approved to treat women with irritable bowel syndrome whose predominant symptom is diarrhea. However, it was withdrawn from the market soon after its introduction because of concerns related to safety. It was reintroduced and is currently available, although certain prescribing guidelines must be followed. Further information " "is available on the 's web site (www.[email or web address].com).  Lubiprostone -- Lubiprostone (Amitiza) is available for treatment of severe constipation and irritable bowel syndrome in women over 18 years who have not responded to other treatments. It works by increasing intestinal fluid secretion. It is expensive compared to other agents. Further testing is needed to clarify the effectiveness and long-term safety of lubiprostone.  Linaclotide -- Linaclotide (Linzess) has been approved for treatment of constipation and irritable bowel syndrome in persons over 18 years who have not responded to other treatments. It works by increasing intestinal fluid secretion. It is expensive as compared with other agents (except lubiprostone). Further studies are needed to clarify the effectiveness and long-term safety of linaclotide.  Antibiotics -- The role of antibiotics in the treatment of irritable bowel syndrome remains unclear. There are some patients whose irritable bowel syndrome symptoms benefit from antibiotic treatment. However, more research is needed before antibiotics are recommended for treatment of irritable bowel syndrome.  HERBS AND NATURAL THERAPIES FOR IRRITABLE BOWEL SYNDROME -- A number of herbal and natural therapies have been advertised (especially on the internet) for the treatment of irritable bowel syndrome. Unfortunately, there is no evidence supporting their benefit. Although small studies may support some of these therapies, the studies are either too small or have major flaws that make definitive conclusions impossible.  ?Peppermint oil - There is some evidence supporting the use of peppermint oil. Peppermint oil can cause or worsen heartburn.  ?Acidophilus - There is increasing interest in the possible beneficial effects of \"healthy\" bacteria (probiotics) in a variety of intestinal diseases including IBS. Whether supplements containing these bacteria are of any benefit is unproven. (See " "\"Probiotics for gastrointestinal diseases\".)  ?Unproven - Chamomile tea is of unproven benefit in irritable bowel syndrome. Furthermore, chamomile can aggravate allergies in people who tend to be allergic to grasses. Evening primrose oil, a supplement containing gamma linolenic acid, is of unproven benefit. Fennel seeds are of unproven benefit.  ?Potentially unsafe - [place] is of unproven benefit and may be unsafe. [place] oil can cause damage to the nervous system. Comfrey is of unproven benefit and can cause serious liver problems.  IRRITABLE BOWEL SYNDROME PROGNOSIS -- Although irritable bowel syndrome can produce substantial physical discomfort and emotional distress, most people with irritable bowel syndrome do not develop serious long-term health conditions. Furthermore, the vast majority of people with irritable bowel syndrome learn to control their symptoms.  It is important to work with a clinician to monitor symptoms over time. If symptoms change over time, further testing may be recommended. Over time, less than 5 percent of people diagnosed with irritable bowel syndrome will be diagnosed with another gastrointestinal condition.  SUMMARY  ?Irritable bowel syndrome (IBS) is a common gastrointestinal disorder affecting approximately 10 to 20 percent of the population. Although the condition cannot be cured, treatments are available to alleviate symptoms.  ?No single cause of irritable bowel syndrome has been identified, although there are theories that gastrointestinal abnormalities, food intolerance, and psychological issues may be involved. (See 'Irritable bowel syndrome causes' above.)  ?The primary symptoms of irritable bowel syndrome are abdominal pain and changes in bowel habits (eg, diarrhea and/or constipation). Abdominal pain can vary in location and severity. Patients can experience primarily diarrhea, primarily constipation, or an alternating pattern of the two; additional gastrointestinal " symptoms may also occur. (See 'Symptoms of irritable bowel syndrome' above.)  ?There is no single diagnostic test for irritable bowel syndrome, and several other gastrointestinal conditions produce similar symptoms; a patient's history, physical examination, and blood test results are all reviewed to rule out other disorders and establish a diagnosis of IBS. (See 'Irritable bowel syndrome diagnosis' above.)  ?There are many different treatments available to relieve the symptoms of irritable bowel syndrome; these include the monitoring of symptoms and patterns, adjustment of the diet to increase fiber and eliminate foods that can worsen symptoms, psychosocial therapy (since stress may aggravate IBS), and medication. Treatments are often used in combination, and because of the variability of symptoms, different treatments work for different people. (See 'Irritable bowel syndrome treatment' above.)  ?Many herbal and natural therapies have been advertised for the treatment of irritable bowel syndrome; however, these therapies have not been proven effective and they are not recommended. (See 'Herbs and natural therapies for irritable bowel syndrome' above.)  ?Although irritable bowel syndrome can cause pain and stress, the majority of patients are able to control their symptoms and live a normal life without developing serious health problems. (See 'Irritable bowel syndrome prognosis' above.)          Health Psychology (Dr. Jace Watts) [phone] ([facility] [place], 3rd Floor S, D[facility]T)   Health Psychology (Dr. Aide Moore) [phone]   Health Psychology (Dr. Bashir Beltran) [phone]   Health Psychology (Dr. Rita King) [phone]   Health Psychology (Dr. Ingris May) [phone]    [email or web address]

## 2019-01-23 ENCOUNTER — TELEPHONE (OUTPATIENT)
Dept: INTERNAL MEDICINE | Facility: CLINIC | Age: 79
End: 2019-01-23

## 2019-01-23 DIAGNOSIS — M81.0 OSTEOPOROSIS: Primary | ICD-10-CM

## 2019-01-23 DIAGNOSIS — M81.0 OSTEOPOROSIS, UNSPECIFIED OSTEOPOROSIS TYPE, UNSPECIFIED PATHOLOGICAL FRACTURE PRESENCE: ICD-10-CM

## 2019-01-23 RX ORDER — ALENDRONATE SODIUM 70 MG/1
70 TABLET ORAL
Qty: 12 TABLET | Refills: 3 | Status: SHIPPED | OUTPATIENT
Start: 2019-01-23 | End: 2020-01-27

## 2019-01-23 NOTE — TELEPHONE ENCOUNTER
[facility] Call Center    Phone Message    May a detailed message be left on voicemail: yes    Reason for Call: Other: Alendronate Sodium 70 MG TBEF     [id number] - [place], [place] - [address]    Action Taken: Message routed to:  [facility] ([facility]): JOSE ALEJANDRO

## 2019-01-23 NOTE — TELEPHONE ENCOUNTER
[facility] Call Center    Phone Message    May a detailed message be left on voicemail: yes, Natalia the Pharmacist can be contacted at [phone].    Reason for Call: Medication Question or concern regarding medication   Prescription Clarification  Name of Medication: Alendronate Sodium 70 MG TBEF  Prescribing Provider: Ling Mackay   Pharmacy: [facility] #[id number] [address]   What on the order needs clarification? Wanting to know if they meant to prescribe the Alendronate Sodium 70 mg TBEF or if they were wanting to prescribe the Alendronate Sodium 70 mg TABLET. Natalia states Alendronate Sodium 70 mg TBEF is not covered by insurance.        Action Taken: Message routed to:  [facility] ([facility]): JOSE ALEJANDRO

## 2019-02-12 ENCOUNTER — OFFICE VISIT (OUTPATIENT)
Dept: PSYCHIATRY | Facility: CLINIC | Age: 79
End: 2019-02-12
Attending: NURSE PRACTITIONER
Payer: COMMERCIAL

## 2019-02-12 VITALS
WEIGHT: 132 LBS | HEART RATE: 49 BPM | SYSTOLIC BLOOD PRESSURE: 149 MMHG | BODY MASS INDEX: 28.56 KG/M2 | DIASTOLIC BLOOD PRESSURE: 62 MMHG

## 2019-02-12 DIAGNOSIS — F43.23 ADJUSTMENT DISORDER WITH MIXED ANXIETY AND DEPRESSED MOOD: ICD-10-CM

## 2019-02-12 DIAGNOSIS — F41.9 ANXIETY: Primary | ICD-10-CM

## 2019-02-12 PROCEDURE — G0463 HOSPITAL OUTPT CLINIC VISIT: HCPCS | Mod: ZF

## 2019-02-12 ASSESSMENT — PAIN SCALES - GENERAL: PAINLEVEL: NO PAIN (0)

## 2019-02-12 NOTE — PROGRESS NOTES
"  Psychiatry Clinic Medical Diagnostic Assessment               Alena Coronel is a 78 year old female who presents to the clinic to establish psychiatric care.    Therapist: None  PCP: Ling Luna  Other Providers: None  Referred by Cheryl  for evaluation of depression and anxiety.      History was provided by patient who was a limited historian.     Chief Complaint                                                                                                             \" Taking Trazodone for many years.  Might want to try another medication \"     History of Present Illness                                                                                 4, 4       Psych critical item history includes [no critical items] .     Most recent history:  While it appears that Alena was attempting to relay information regarding her psychiatric history, it was difficult to understand due to language issues. Alena endorses she worries too much about \"everything.\"  Her worries include the wellbeing of her family, how others perceive her, using language, and the behavior of her children.  Her children are 55, 52, 51, 49, and 45.   She has difficulty going out to shop due to anxiety and concern about what she read on Facebook about [place] food being poisoned by the Chinese and [place] [employer]. More information is needed regarding her paranoia.  Currently, it appears to be cultural in nature and not delusional. She is currently taking Trazodone 50-100mg to sleep. 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  Her worry is a barrier to her falling asleep.  Alena also reports waking often after she falls asleep which leads to feeling tired the next day.    Alena also reports she \"feels sad inside.\"  Endorses depressed mood, anhedonia, poor appetite, fatigue, feeling bad about herself, and difficulty concentrating.  She does not endorse suicidal ideation currently " "    Pertinent Background: Alena reports she has felt \"sadness\" since she was a child.  Her mother  when she was 21 years and she was left to take care of her siblings.  She did this until  and left the country in .   Alena had a child who  in  at 13 months old.  She believes it may have been due to \"black magic\" after a \"sorceror\" touched her child.  Again, the belief appears to be cultural in nature and not delusional.   There was some difficulty understanding Alena due to language issues, but it appears that she may have experienced and/or withness trauma when she lived in [place] due to the political instability.  Alena reports she continues to think about the death of her child on a daily basis.  Alena and her family moved to the  in .  Her  is sick currently and Aelna is the primary caretaker which contributes to her worry.   She reports taking taking Zoloft several years ago and believes it was helpful in management of worry.  No psychiatric hospitalizations.       Recent Symptoms:   Depression:  depressed mood, anhedonia, low energy, insomnia and poor concentration /memory  Elevated:  none  Psychosis:  none  Anxiety:  excessive worry and nervous/overwhelmed  Panic Attack:  none  Trauma Related:  Endorses no symptoms but lived in [place] during the [place]       Recent Substance Use:  Alcohol- yes, glass of wine a few times per month , Tobacco- no , Caffeine- coffee/ tea [1 cup], Opioids- no    Narcan Kit- N/A , Cannabis- no  and Other Illicit Drugs-none     Substance Use History                                                                 None        Psychiatric History     None      Psychiatric Medication Trials     Zoloft (sertraline)                                                       OR this and delete the other    Drug /  Start Date Dose (mg) Helpful Adverse Effects   DC Reason / Date                          Social/ Family History             "   [per patient report]                                                  1ea, 1ea     FINANCIAL SUPPORT- [employer]  since 2008  CHILDREN- 5 adult children       LIVING SITUATION- Lives with  in house in [place]    LEGAL- None  EARLY HISTORY/ EDUCATION- Grew up in [facility].  Moved to [place] in 1978.  Finished 10th grade  SOCIAL/ SPIRITUAL SUPPORT- self-reliant       TRAUMA HISTORY (self-report)- lived in [place] during wartime  FEELS SAFE AT HOME- Yes  FAMILY HISTORY-  UNKNOWN    Medical / Surgical History                                                                                                                     Patient Active Problem List   Diagnosis     Generalized osteoarthrosis, unspecified site     Irritable bowel syndrome     Adjustment disorder with mixed anxiety and depressed mood     Insomnia     Eczema     Hypertension goal BP (blood pressure) < 140/90     Hyperlipidemia LDL goal <130     Chronic neck pain     Hypokalemia     Osteoporosis     Chest pain     Idiopathic thrombocytopenia (H)     RUQ abdominal pain     Immune to hepatitis A     Dysphagia     GERD (gastroesophageal reflux disease)     Advanced directives, counseling/discussion     Macular drusen     PVD (posterior vitreous detachment)     Venous stasis     Anxiety     Cataract     TMJ (temporomandibular joint syndrome)     Urinary incontinence     Chronic cough     Digestive problems     Lumbago     Low back pain with bilateral sciatica     Palpitations     Chronic pruritus     Disorder of bone and cartilage       Past Surgical History:   Procedure Laterality Date     BIOPSY OF BREAST, INCISIONAL  1985     C APPENDECTOMY  1975     C LIGATE FALLOPIAN TUBE  1975     COLONOSCOPY  11/14/2008        Medical Review of Systems                                                                                                     2, 10     A comprehensive review of systems was performed and is negative other than noted in the  HPI.    Allergy                                Penicillins; Shellfish-derived products; and Sulfacetamide  Current Medications                                                                                                         Current Outpatient Medications   Medication Sig Dispense Refill     Acetaminophen (TYLENOL PO) Take 325 mg by mouth every 4 hours as needed for mild pain or fever       albuterol (PROAIR RESPICLICK) 108 (90 Base) MCG/ACT inhaler Inhale 2 puffs into the lungs every 6 hours as needed for shortness of breath / dyspnea or wheezing 1 Inhaler 1     Albuterol (VENTOLIN IN) 2-4 puffs every 4-6 hours as needed.Shannon Dao LPN 3:39 PM on 10/29/2018       alendronate (FOSAMAX) 70 MG tablet Take 1 tablet (70 mg) by mouth every 7 days 12 tablet 3     Alendronate Sodium 70 MG TBEF Take 70 mg by mouth every 7 days 12 tablet 2     atorvastatin (LIPITOR) 40 MG tablet Take 1 tablet (40 mg) by mouth daily 90 tablet 3     Biotin 2500 MCG CAPS Take 5,000 mcg/day by mouth       Blood Pressure Monitor KIT 1 Device daily 1 kit 1     Calcium Carbonate-Vit D-Min (CALCIUM 1200 PO) Take 1,000 Units by mouth 1 tab daily       Cholecalciferol (VITAMIN D) 1000 UNITS capsule Take 5,000 Units by mouth daily        Coenzyme Q10 (COQ10) 200 MG CAPS Take 200 mg by mouth daily       CVS OMEGA-3 KRILL  MG CAPS Take  by mouth.       esomeprazole (NEXIUM) 40 MG DR capsule Take 1 capsule (40 mg) by mouth every morning (before breakfast) Take 30-60 minutes before eating. 90 capsule 3     Flaxseed, Linseed, POWD Take 1-2 tsp by mouth daily.       fluocinonide (LIDEX) 0.05 % ointment Apply topically 2 times daily       fluticasone (FLONASE) 50 MCG/ACT nasal spray Spray 1 spray into both nostrils daily 1 Bottle 3     ibuprofen (ADVIL/MOTRIN) 200 MG capsule Take 200 mg by mouth every 4 hours as needed for fever       metoprolol succinate ER (TOPROL-XL) 25 MG 24 hr tablet Take 1 tablet (25 mg) by mouth daily 90 tablet 3      "mometasone furoate (ASMANEX HFA) 200 MCG/ACT inhaler Inhale 2 puffs into the lungs 2 times daily 3 Inhaler 3     Omega-3 Fatty Acids (CVS NATURAL FISH OIL) 1000 MG CAPS Take 1,400 mg by mouth daily       TRAZODONE HCL PO Take 100 mg by mouth At Bedtime       triamcinolone (KENALOG) 0.5 % OINT ointment Apply to affected area twice a day, when rash is active 30 g 0     Vitals                                                                                                                         3, 3     /62   Pulse (!) 49   Wt 59.9 kg (132 lb)   BMI 28.56 kg/m       Mental Status Exam                                                                                      9, 14 cog gs     Alertness: alert  and oriented  Appearance: casually groomed  Behavior/Demeanor: cooperative, pleasant and calm, with good  eye contact   Speech: regular rate and rhythm  Language: no obvious problem and english not primary language  Psychomotor: normal or unremarkable  Mood: \"ok\"  Affect: appropriate; was congruent to mood; was congruent to content  Thought Process/Associations: unremarkable  Thought Content:  Reports preoccupations;  Denies suicidal ideation and violent ideation  Perception:  Reports none;  Denies auditory hallucinations and visual hallucinations  Insight: limited  Judgment: adequate for safety  Cognition: (6) does  appear grossly intact; formal cognitive testing was not done  Gait and Station: unremarkable    Labs and Data                                                                                                                     Rating Scales:   PHQ9 and CAGE AIDE 1. Have you ever felt that you outght to cut down on your drinking or drug use?  no  2. Have people annoyed you by criticizing your drinking or drug use? no  3. Have you ever felt bad or guilty about your drinking or drug use?  no  4. Have you ever had a drink or used drugs first thing in the morning to steady your nerves or to get rid of a " hangover?  no    PHQ9 Today:  9  PHQ-9 SCORE 9/14/2012 9/20/2018 11/28/2018   PHQ-9 Total Score 5 - -   PHQ-9 Total Score - 12 7         Recent Labs   Lab Test 12/11/18  0012 09/21/18  0930 05/07/13  1026   CR 0.94 0.87 0.78   GFRESTIMATED 58* 63 73     Recent Labs   Lab Test 12/11/18  0012 04/29/13  1620   AST 24 34   ALT 22 32   ALKPHOS 44 64       Diagnosis and Assessment                                                                             m2, h3     Today the following issues were addressed:    1) Major Depressive Disorder, recurrent, mild-moderate  2) Unspecified Anxiety Disorder    [place] Prescription Monitoring Program [] was not checked today:  not using controlled substances.        Plan                                                                                                                     m2, h3     1) Medication Management  Continue Trazodone 100mg at bedtime   Start Zoloft 25mg for a week then increase to 50mg.     2) Therapy  Culturally sensitive therapist recommended.  Will request resources from Social Work Team      RTC: 1 month    CRISIS NUMBERS:   Provided routinely in AVS.    Treatment Risk Statement:  The patient understands the risks, benefits, adverse effects and alternatives. Agrees to treatment with the capacity to do so. No medical contraindications to treatment. Agrees to call clinic for any problems. The patient understands to call 911 or go to the nearest ED if life threatening or urgent symptoms occur.     WHODAS 2.0  TODAY total score = N/A; [a 12-item WHODAS 2.0 assessment was not completed by the pt today and/or recorded in EPIC].     PROVIDER:  YAZAN Steiner CNP

## 2019-02-12 NOTE — NURSING NOTE
Chief Complaint   Patient presents with     Eval/Assessment     Manual pulse 52.Syl Richards LPN

## 2019-02-12 NOTE — PATIENT INSTRUCTIONS
New Medication:  Zoloft      Take 1/2 tablet (25mg) every morning for a week then increase to 1 tablet (50mg) every morning

## 2019-03-01 NOTE — TELEPHONE ENCOUNTER
FUTURE VISIT INFORMATION      FUTURE VISIT INFORMATION:    Date: 3/4/19    Time: 2:20PM    [place] [facility] [place]FORMATION:    Referring provider:  Dr. Lanette Berger    Referring providers clinic:  [facility]    Reason for visit/diagnosis:  Anal discharge, clear mucous discharge after every bowel movement    NOTES STATUS DETAILS   OFFICE NOTE from referring provider Internal 12/11/18   OFFICE NOTE from other specialist Internal 1/3/19   DISCHARGE SUMMARY from hospital Internal 12/10/18   OPERATIVE REPORT Internal    MEDICATION LIST Internal         ENDOSCOPY  Internal 1/25/12   COLONOSCOPY Care Everywhere 6/4/18, 11/14/08   ERCP N/A    EUS N/A    STOOL TESTING N/A    PERTINENT LABS Internal    PATHOLOGY REPORTS (RELATED) Internal    IMAGING (CT, MRI, EGD) Internal

## 2019-03-04 ENCOUNTER — PRE VISIT (OUTPATIENT)
Dept: GASTROENTEROLOGY | Facility: CLINIC | Age: 79
End: 2019-03-04

## 2019-03-04 ENCOUNTER — OFFICE VISIT (OUTPATIENT)
Dept: GASTROENTEROLOGY | Facility: CLINIC | Age: 79
End: 2019-03-04
Attending: INTERNAL MEDICINE
Payer: COMMERCIAL

## 2019-03-04 VITALS
BODY MASS INDEX: 27.48 KG/M2 | OXYGEN SATURATION: 97 % | HEIGHT: 58 IN | DIASTOLIC BLOOD PRESSURE: 65 MMHG | HEART RATE: 44 BPM | TEMPERATURE: 97.4 F | RESPIRATION RATE: 16 BRPM | WEIGHT: 130.9 LBS | SYSTOLIC BLOOD PRESSURE: 137 MMHG

## 2019-03-04 DIAGNOSIS — K59.00 CONSTIPATION, UNSPECIFIED CONSTIPATION TYPE: Primary | ICD-10-CM

## 2019-03-04 RX ORDER — FLAXSEED OIL
OIL (ML) MISCELLANEOUS
COMMUNITY
End: 2019-09-19

## 2019-03-04 SDOH — HEALTH STABILITY: MENTAL HEALTH: HOW OFTEN DO YOU HAVE A DRINK CONTAINING ALCOHOL?: 2-4 TIMES A MONTH

## 2019-03-04 SDOH — HEALTH STABILITY: MENTAL HEALTH: HOW MANY STANDARD DRINKS CONTAINING ALCOHOL DO YOU HAVE ON A TYPICAL DAY?: 1 OR 2

## 2019-03-04 ASSESSMENT — PAIN SCALES - GENERAL: PAINLEVEL: NO PAIN (0)

## 2019-03-04 ASSESSMENT — MIFFLIN-ST. JEOR: SCORE: 980.89

## 2019-03-04 NOTE — PATIENT INSTRUCTIONS
It was a pleasure taking care of you today.  I've included a brief summary of our discussion and care plan from today's visit below.  Please review this information with your primary care provider.  ______________________________________________________________________    My recommendations are summarized as follows:    --Increase hydration, drink adequate amounts of water  -- Recommend soluble fiber supplementation on a daily basis (Metamucil, citrucel or benefiber).  Start with 1 tablespoon per day and if tolerated, may increase up to 2-3 tablespoons per day.  You may experience some bloating with initiation of fiber supplementation that will improve over the first month.  A good fiber trial to evaluate the effect is 3-6 months.  -- Recommend a trial of Miralax.  This is not a stimulant laxative and is safe to take on a daily basis.  Try 1-2 cap(s) full every day.  You can titrate this dose (increase or decrease) based on stool frequency and consistency.  --Repeat colonoscopy in 5 years       -- For heartburn, try Zantac (ranitdine) 150 mg twice daily as needed  Reflux/heartburn/GERD relief:  1) Prop actual bed up, not just with pillows.  Propping up with just pillows can actually make things worse if it is causing you to bend at the waist while sleeping  2) Avoid alcohol, as this causes relaxation of the sphincter and makes it easier for food to travel up esophagus.  If you do drink alcohol, do not drink before bed or before meals.  3)  Eat small meals. Avoid overeating.  4) Avoid triggers such as fatty foods, processed foods, and high fructose corn syrup (or food that contain these)  5) Weight loss     Return to GI Clinic in 3 months to review your progress.    ______________________________________________________________________    Who do I call with any questions after my visit?  Please be in touch if there are any further questions that arise following today's visit.  There are multiple ways to contact your  gastroenterology care team.        During business hours, you may reach a Gastroenterology nurse at [phone], option 3.       To schedule or reschedule an appointment, please call [phone].       You can always send a secure message through [email or web address].  [email or web address] messages are answered by your nurse or doctor typically within 24 hours.  Please allow extra time on weekends and holidays.        For urgent/emergent questions after business hours, you may reach the on-call GI Fellow by contacting the [facility]  at [phone].      In order for your refill to be processed in a timely fashion, it is your responsibility to ensure you follow the recommendations from your provider regarding your laboratory studies and follow up appointments.       How will I get the results of any tests ordered?    You will receive all of your results.  If you have signed up for [employer], any tests ordered at your visit will be available to you after your physician reviews them.  Typically this takes 1-2 weeks.  If there are urgent results that require a change in your care plan, your physician or nurse will call you to discuss the next steps.      What is [email or web address]?  [email or web address] is a secure way for you to access all of your healthcare records from the [facility].  It is a web based computer program, so you can sign on to it from any location.  It also allows you to send secure messages to your care team.  I recommend signing up for [email or web address] access if you have not already done so and are comfortable with using a computer.      How to I schedule a follow-up visit?  If you did not schedule a follow-up visit today, please call [phone] to schedule a follow-up office visit.        Sincerely,    Urban Pascal PA-C  [facility]  Division of Gastroenterology

## 2019-03-04 NOTE — NURSING NOTE
"Chief Complaint   Patient presents with     Consult     Anal Discharge       Vitals:    03/04/19 1420   BP: 137/65   BP Location: Left arm   Patient Position: Sitting   Cuff Size: Adult Regular   Pulse: (!) 44   Resp: 16   Temp: 97.4  F (36.3  C)   TempSrc: Oral   SpO2: 97%   Weight: 59.4 kg (130 lb 14.4 oz)   Height: 1.485 m (4' 10.47\")       Body mass index is 26.92 kg/m .      Sheila Sanchez LPN                          "

## 2019-03-04 NOTE — PROGRESS NOTES
GI CLINIC VISIT    CC/REFERRING MD:  Lanette TINSLEY*  REASON FOR CONSULTATION: abdominal pain    ASSESSMENT/PLAN:  78-year-old female with past medical history to include hypertension, GERD, anxiety, insomnia among many other variety complaints who presents to the GI clinic for consultation regarding reported mucus discharge from the anus, abdominal discomfort and GERD symptoms.     1.  Abdominal pain: This is associated with excess stool burden and relieved after bowel movement.  Her colonoscopy that was performed June 2018 was unremarkable making it less likely to be attributed to a mass or inflammatory causes.  She is passing firm hard stool so less likely to be an infectious process.  Despite a bowel movement daily, she is passing Lincoln scale 1 stools with excessive straining.  Patient is currently taking Metamucil and MiraLAX which is a great start.  I recommend to titrate these to affect.  She certainly has room to increase her MiraLAX, and we will try twice daily administration in addition to titrating her soluble fiber supplementation.    --Continue Metamucil 1-4 tablespoon daily, increase up to 2-3 tablespoons daily.  Titrate to effect based on stool frequency and consistency  --Continue MiraLAX, increase to 2 capsules 4 daily and again titrate to stool frequency and consistency  --Increase hydration as much as possible  --Increase activity which can also help with constipation    2. GERD: improvement of bowel habits will also improve symptoms.  Trial of ranitidine 150 mg twice daily as needed.  Also encourage GERD lifestyle modifications.    3. Colorectal cancer screening: Colonoscopy in 6/2018 patient shows one 5 mm polyp in the sigmoid colon that was removed and resected.  Normal ileum, exam otherwise normal.  Biopsies consistent with tubular adenoma with recommendations to repeat her colonoscopy in 5 years, 2023.    RTC 3 months    Thank you for this consultation.  It was a pleasure to  participate in the care of this patient; please contact us with any further questions.       Urban Pascal PA-C  Division of Gastroenterology, Hepatology and Nutrition  [facility]      HPI  78-year-old female with past medical history to include hypertension, GERD, anxiety, insomnia among many other variety complaints who presents to the GI clinic for consultation regarding reported mucus discharge from the anus, abdominal discomfort and GERD symptoms.  Patient is present with her daughter and denied an .  Language barriers are certainly present at today's visit.    Regarding abdominal discomfort, this is primarily located in the left lower quadrant.  She feels that this is associated with her bowel movements.  She has a bowel movement daily but is very hard to pass.  She denies any blood in stool.  She reports taking Metamucil 1 teaspoon twice a day.  She also reports taking MiraLAX 1 capful daily.  TSH was checked with her primary care provider and was within normal limits.  She had a colonoscopy in June 2018 that was within normal limits, polyp removed consistent with a tubular adenoma and recommendation to repeat colonoscopy in 5 years.    She also complains of occasional fullness after eating.  Sometimes has overt substernal burning consistent with heartburn otherwise just feeling a sensation of fullness after eating.  No nausea or vomiting.    ROS:    No fevers or chills  No weight loss  No blurry vision, double vision or change in vision  No sore throat  No lymphadenopathy  No headache, paraesthesias, or weakness in a limb  No shortness of breath or wheezing  No chest pain or pressure  No arthralgias or myalgias  No rashes or skin changes  No odynophagia or dysphagia  No BRBPR, hematochezia, melena  No dysuria, frequency or urgency  No hot/cold intolerance or polyria  No anxiety or depression    PROBLEM LIST  Patient Active Problem List    Diagnosis Date Noted     Disorder of bone and  cartilage 11/28/2018     Priority: Medium     Urinary incontinence      Priority: Medium     Chronic cough      Priority: Medium     Digestive problems      Priority: Medium     Lumbago      Priority: Medium     Low back pain with bilateral sciatica      Priority: Medium     Palpitations      Priority: Medium     Chronic pruritus      Priority: Medium     TMJ (temporomandibular joint syndrome) 08/22/2013     Priority: Medium     Cataract 01/31/2013     Priority: Medium     Utility update for deleted IMO code  Im[employer] Update utility       Anxiety      Priority: Medium     Venous stasis 09/14/2012     Priority: Medium     Macular drusen 01/31/2012     Priority: Medium     PVD (posterior vitreous detachment) 01/31/2012     Priority: Medium     Advanced directives, counseling/discussion 01/19/2012     Priority: Medium     Advance Directive Problem List Overview:   Name Relationship Phone    Primary Health Care Agent            Alternative Health Care Agent          Discussed advance care planning with patient; however, patient declined at this time. 1/19/2012            Dysphagia 12/08/2011     Priority: Medium     GERD (gastroesophageal reflux disease) 12/08/2011     Priority: Medium     Immune to hepatitis A 07/25/2011     Priority: Medium     RUQ abdominal pain 07/20/2011     Priority: Medium     Idiopathic thrombocytopenia (H) 07/07/2011     Priority: Medium     may be lab order,rechecking       Chest pain 07/06/2011     Priority: Medium     Osteoporosis 11/09/2010     Priority: Medium     DEXA 2010. Not taking the fosamax since the last 1 month as it was worsening her reflux.       Hypokalemia      Priority: Medium     Chronic neck pain 05/19/2010     Priority: Medium     Hyperlipidemia LDL goal <130 05/09/2010     Priority: Medium     Hypertension goal BP (blood pressure) < 140/90      Priority: Medium     Eczema 07/02/2008     Priority: Medium     Adjustment disorder with mixed anxiety and depressed mood       Priority: Medium     Insomnia      Priority: Medium     Irritable bowel syndrome 02/15/2008     Priority: Medium     Generalized osteoarthrosis, unspecified site 02/04/2008     Priority: Medium       PERTINENT PAST MEDICAL HISTORY:  Past Medical History:   Diagnosis Date     Adjustment disorder with mixed anxiety and depressed mood      Anxiety      Chronic cough      Chronic neck pain 5/19/2010     Chronic pruritus      Digestive problems      Dysphagia      GERD (gastroesophageal reflux disease)      Hyperlipidemia LDL goal <130 5/9/2010     Hypertension goal BP (blood pressure) < 140/90      Hypokalemia      Insomnia      Intestinal disaccharidase deficiencies and disaccharide malabsorption      Localized osteoarthrosis not specified whether primary or secondary, hand 2008     Low back pain with bilateral sciatica      Osteopenia      Osteoporosis, unspecified      Palpitations      Urinary incontinence        PREVIOUS SURGERIES:  Past Surgical History:   Procedure Laterality Date     BIOPSY OF BREAST, INCISIONAL  1985     C APPENDECTOMY  1975     C LIGATE FALLOPIAN TUBE  1975     COLONOSCOPY  11/14/2008     ALLERGIES:     Allergies   Allergen Reactions     Penicillins Rash     Shellfish-Derived Products Itching and Rash     Sulfacetamide Rash       PERTINENT MEDICATIONS:    Current Outpatient Medications:      Acetaminophen (TYLENOL PO), Take 325 mg by mouth every 4 hours as needed for mild pain or fever, Disp: , Rfl:      alendronate (FOSAMAX) 70 MG tablet, Take 1 tablet (70 mg) by mouth every 7 days, Disp: 12 tablet, Rfl: 3     atorvastatin (LIPITOR) 40 MG tablet, Take 1 tablet (40 mg) by mouth daily, Disp: 90 tablet, Rfl: 3     Blood Pressure Monitor KIT, 1 Device daily, Disp: 1 kit, Rfl: 1     Calcium Carbonate-Vit D-Min (CALCIUM 1200 PO), Take 1,000 Units by mouth 1 tab daily, Disp: , Rfl:      Cholecalciferol (VITAMIN D) 1000 UNITS capsule, Take 5,000 Units by mouth daily , Disp: , Rfl:      Coenzyme Q10  (COQ10) 200 MG CAPS, Take 200 mg by mouth daily, Disp: , Rfl:      [employer] OMEGA-3 KRILL  MG CAPS, Take  by mouth., Disp: , Rfl:      Flaxseed Oil (LINSEED OIL) OIL, , Disp: , Rfl:      Flaxseed, Linseed, POWD, Take 1-2 tsp by mouth daily., Disp: , Rfl:      fluocinonide (LIDEX) 0.05 % ointment, Apply topically 2 times daily, Disp: , Rfl:      metoprolol succinate ER (TOPROL-XL) 25 MG 24 hr tablet, Take 1 tablet (25 mg) by mouth daily, Disp: 90 tablet, Rfl: 3     Omega-3 Fatty Acids ([employer] NATURAL FISH OIL) 1000 MG CAPS, Take 1,400 mg by mouth daily, Disp: , Rfl:      sertraline (ZOLOFT) 50 MG tablet, Take 1/2 tablet (25mg) every morning for a week then increase to 1 tablet (50mg) every morning., Disp: 30 tablet, Rfl: 1     TRAZODONE HCL PO, Take 100 mg by mouth At Bedtime, Disp: , Rfl:      triamcinolone (KENALOG) 0.5 % OINT ointment, Apply to affected area twice a day, when rash is active, Disp: 30 g, Rfl: 0     TURMERIC PO, , Disp: , Rfl:      albuterol (PROAIR RESPICLICK) 108 (90 Base) MCG/ACT inhaler, Inhale 2 puffs into the lungs every 6 hours as needed for shortness of breath / dyspnea or wheezing (Patient not taking: Reported on 3/4/2019), Disp: 1 Inhaler, Rfl: 1     Albuterol (VENTOLIN IN), 2-4 puffs every 4-6 hours as needed.Shannon Dao LPN 3:39 PM on 10/29/2018, Disp: , Rfl:      Biotin 2500 MCG CAPS, Take 5,000 mcg/day by mouth, Disp: , Rfl:      esomeprazole (NEXIUM) 40 MG DR capsule, Take 1 capsule (40 mg) by mouth every morning (before breakfast) Take 30-60 minutes before eating. (Patient not taking: Reported on 3/4/2019), Disp: 90 capsule, Rfl: 3     fluticasone (FLONASE) 50 MCG/ACT nasal spray, Spray 1 spray into both nostrils daily (Patient not taking: Reported on 3/4/2019), Disp: 1 Bottle, Rfl: 3     ibuprofen (ADVIL/MOTRIN) 200 MG capsule, Take 200 mg by mouth every 4 hours as needed for fever, Disp: , Rfl:      mometasone furoate (ASMANEX HFA) 200 MCG/ACT inhaler, Inhale 2 puffs into the  lungs 2 times daily (Patient not taking: Reported on 3/4/2019), Disp: 3 Inhaler, Rfl: 3    SOCIAL HISTORY:  Social History     Socioeconomic History     Marital status:      Spouse name: Bonilla     Number of children: 5     Years of education: Not on file     Highest education level: Not on file   Occupational History     Occupation:      Employer: RETIRED     Comment: /social service for [employer]     Employer: [employer]   Social Needs     Financial resource strain: Not on file     Food insecurity:     Worry: Not on file     Inability: Not on file     Transportation needs:     Medical: Not on file     Non-medical: Not on file   Tobacco Use     Smoking status: Never Smoker     Smokeless tobacco: Never Used     Tobacco comment: smoke free household.   Substance and Sexual Activity     Alcohol use: Yes     Alcohol/week: 0.6 - 1.2 oz     Types: 1 - 2 Glasses of wine per week     Frequency: 2-4 times a month     Drinks per session: 1 or 2     Comment: up to 3 glasses after dinner every week     Drug use: No     Sexual activity: Yes     Partners: Male   Lifestyle     Physical activity:     Days per week: Not on file     Minutes per session: Not on file     Stress: Not on file   Relationships     Social connections:     Talks on phone: Not on file     Gets together: Not on file     Attends Congregational service: Not on file     Active member of club or organization: Not on file     Attends meetings of clubs or organizations: Not on file     Relationship status: Not on file     Intimate partner violence:     Fear of current or ex partner: Not on file     Emotionally abused: Not on file     Physically abused: Not on file     Forced sexual activity: Not on file   Other Topics Concern     Parent/sibling w/ CABG, MI or angioplasty before 65F 55M? No      Service Not Asked     Blood Transfusions Not Asked     Caffeine Concern Not Asked     Comment: Occasional Coffee  "    Occupational Exposure Not Asked     Hobby Hazards Not Asked     Sleep Concern Not Asked     Stress Concern Not Asked     Weight Concern Not Asked     Special Diet Not Asked     Back Care Not Asked     Exercise Not Asked     Comment: Biking at home, Treadmill - Everyday     Bike Helmet Not Asked     Seat Belt Not Asked     Self-Exams Not Asked   Social History Narrative    Originally from [place], immigrated . .  5 Children (1st child ). Retired, [employer] clinic helped with mentally ill patients.           FAMILY HISTORY:  FH of CRC: Father, reports was diagnosed with GI cancer ?bowel vs liver? In his 60s.   FH of IBD: None   Family History   Problem Relation Age of Onset     Other - See Comments Mother          during childbirth     Gastrointestinal Disease Father         Stomach/Liver Problems     Other - See Comments Sister          in [place] [facility], presented as bad headache     Mental Illness Sister         ?schizophrenia, paranoid     Vision Loss Sister      Mental Illness Brother      Diabetes Sister      Glaucoma No family hx of      Macular Degeneration No family hx of        Past/family/social history reviewed and no changes    PHYSICAL EXAMINATION:  Constitutional: aaox3, cooperative, pleasant, not dyspneic/diaphoretic, no acute distress  Vitals reviewed: /65 (BP Location: Left arm, Patient Position: Sitting, Cuff Size: Adult Regular)   Pulse (!) 44   Temp 97.4  F (36.3  C) (Oral)   Resp 16   Ht 1.485 m (4' 10.47\")   Wt 59.4 kg (130 lb 14.4 oz)   SpO2 97%   BMI 26.92 kg/m    Wt:   Wt Readings from Last 2 Encounters:   19 59.4 kg (130 lb 14.4 oz)   19 60.1 kg (132 lb 9.6 oz)      Eyes: Sclera anicteric/injected  Ears/nose/mouth/throat: Normal oropharynx without ulcers or exudate, mucus membranes moist, hearing intact  Neck: supple, thyroid normal size  CV: No edema  Respiratory: Unlabored breathing  Lymph: No axillary, submandibular, supraclavicular or inguinal " lymphadenopathy  Abd: Nondistended, +bs, no hepatosplenomegaly, mildly tender to LLQ, no peritoneal signs  Skin: warm, perfused, no jaundice  Psych: Normal affect  MSK: Normal gait      PERTINENT STUDIES:    Orders Only on 11/28/2018   Component Date Value Ref Range Status     WBC 11/28/2018 4.8  4.0 - 11.0 10e9/L Final     RBC Count 11/28/2018 4.82  3.8 - 5.2 10e12/L Final     Hemoglobin 11/28/2018 12.7  11.7 - 15.7 g/dL Final     Hematocrit 11/28/2018 40.7  35.0 - 47.0 % Final     MCV 11/28/2018 84  78 - 100 fl Final     MCH 11/28/2018 26.3* 26.5 - 33.0 pg Final     MCHC 11/28/2018 31.2* 31.5 - 36.5 g/dL Final     RDW 11/28/2018 14.3  10.0 - 15.0 % Final     Platelet Count 11/28/2018 261  150 - 450 10e9/L Final     Hemoglobin A1C 11/28/2018 5.7* 0 - 5.6 % Final

## 2019-03-04 NOTE — LETTER
3/4/2019       RE: Alena Coronel  [address] [facility] [id number]     Dear Colleague,    Thank you for referring your patient, Alena Coronel, to the [facility]. Please see a copy of my visit note below.    GI CLINIC VISIT    CC/REFERRING MD:  Lanette TINSLEY*  REASON FOR CONSULTATION: abdominal pain    ASSESSMENT/PLAN:  78-year-old female with past medical history to include hypertension, GERD, anxiety, insomnia among many other variety complaints who presents to the GI clinic for consultation regarding reported mucus discharge from the anus, abdominal discomfort and GERD symptoms.     1.  Abdominal pain: This is associated with excess stool burden and relieved after bowel movement.  Her colonoscopy that was performed June 2018 was unremarkable making it less likely to be attributed to a mass or inflammatory causes.  She is passing firm hard stool so less likely to be an infectious process.  Despite a bowel movement daily, she is passing Seneca scale 1 stools with excessive straining.  Patient is currently taking Metamucil and MiraLAX which is a great start.  I recommend to titrate these to affect.  She certainly has room to increase her MiraLAX, and we will try twice daily administration in addition to titrating her soluble fiber supplementation.    --Continue Metamucil 1-4 tablespoon daily, increase up to 2-3 tablespoons daily.  Titrate to effect based on stool frequency and consistency  --Continue MiraLAX, increase to 2 capsules 4 daily and again titrate to stool frequency and consistency  --Increase hydration as much as possible  --Increase activity which can also help with constipation    2. GERD: improvement of bowel habits will also improve symptoms.  Trial of ranitidine 150 mg twice daily as needed.  Also encourage GERD lifestyle modifications.    3. Colorectal cancer screening: Colonoscopy in 6/2018  patient shows one 5 mm polyp in the sigmoid colon that was removed and resected.  Normal ileum, exam otherwise normal.  Biopsies consistent with tubular adenoma with recommendations to repeat her colonoscopy in 5 years, 2023.    RTC 3 months    Thank you for this consultation.  It was a pleasure to participate in the care of this patient; please contact us with any further questions.       Urban Pascal PA-C  Division of Gastroenterology, Hepatology and Nutrition  [facility]      HPI  78-year-old female with past medical history to include hypertension, GERD, anxiety, insomnia among many other variety complaints who presents to the GI clinic for consultation regarding reported mucus discharge from the anus, abdominal discomfort and GERD symptoms.  Patient is present with her daughter and denied an .  Language barriers are certainly present at today's visit.    Regarding abdominal discomfort, this is primarily located in the left lower quadrant.  She feels that this is associated with her bowel movements.  She has a bowel movement daily but is very hard to pass.  She denies any blood in stool.  She reports taking Metamucil 1 teaspoon twice a day.  She also reports taking MiraLAX 1 capful daily.  TSH was checked with her primary care provider and was within normal limits.  She had a colonoscopy in June 2018 that was within normal limits, polyp removed consistent with a tubular adenoma and recommendation to repeat colonoscopy in 5 years.    She also complains of occasional fullness after eating.  Sometimes has overt substernal burning consistent with heartburn otherwise just feeling a sensation of fullness after eating.  No nausea or vomiting.    ROS:    No fevers or chills  No weight loss  No blurry vision, double vision or change in vision  No sore throat  No lymphadenopathy  No headache, paraesthesias, or weakness in a limb  No shortness of breath or wheezing  No chest pain or pressure  No  arthralgias or myalgias  No rashes or skin changes  No odynophagia or dysphagia  No BRBPR, hematochezia, melena  No dysuria, frequency or urgency  No hot/cold intolerance or polyria  No anxiety or depression    PROBLEM LIST  Patient Active Problem List    Diagnosis Date Noted     Disorder of bone and cartilage 11/28/2018     Priority: Medium     Urinary incontinence      Priority: Medium     Chronic cough      Priority: Medium     Digestive problems      Priority: Medium     Lumbago      Priority: Medium     Low back pain with bilateral sciatica      Priority: Medium     Palpitations      Priority: Medium     Chronic pruritus      Priority: Medium     TMJ (temporomandibular joint syndrome) 08/22/2013     Priority: Medium     Cataract 01/31/2013     Priority: Medium     Utility update for deleted IMO code  Imo Update utility       Anxiety      Priority: Medium     Venous stasis 09/14/2012     Priority: Medium     Macular drusen 01/31/2012     Priority: Medium     PVD (posterior vitreous detachment) 01/31/2012     Priority: Medium     Advanced directives, counseling/discussion 01/19/2012     Priority: Medium     Advance Directive Problem List Overview:   Name Relationship Phone    Primary Health Care Agent            Alternative Health Care Agent          Discussed advance care planning with patient; however, patient declined at this time. 1/19/2012            Dysphagia 12/08/2011     Priority: Medium     GERD (gastroesophageal reflux disease) 12/08/2011     Priority: Medium     Immune to hepatitis A 07/25/2011     Priority: Medium     RUQ abdominal pain 07/20/2011     Priority: Medium     Idiopathic thrombocytopenia (H) 07/07/2011     Priority: Medium     may be lab order,rechecking       Chest pain 07/06/2011     Priority: Medium     Osteoporosis 11/09/2010     Priority: Medium     DEXA 2010. Not taking the fosamax since the last 1 month as it was worsening her reflux.       Hypokalemia      Priority: Medium      Chronic neck pain 05/19/2010     Priority: Medium     Hyperlipidemia LDL goal <130 05/09/2010     Priority: Medium     Hypertension goal BP (blood pressure) < 140/90      Priority: Medium     Eczema 07/02/2008     Priority: Medium     Adjustment disorder with mixed anxiety and depressed mood      Priority: Medium     Insomnia      Priority: Medium     Irritable bowel syndrome 02/15/2008     Priority: Medium     Generalized osteoarthrosis, unspecified site 02/04/2008     Priority: Medium       PERTINENT PAST MEDICAL HISTORY:  Past Medical History:   Diagnosis Date     Adjustment disorder with mixed anxiety and depressed mood      Anxiety      Chronic cough      Chronic neck pain 5/19/2010     Chronic pruritus      Digestive problems      Dysphagia      GERD (gastroesophageal reflux disease)      Hyperlipidemia LDL goal <130 5/9/2010     Hypertension goal BP (blood pressure) < 140/90      Hypokalemia      Insomnia      Intestinal disaccharidase deficiencies and disaccharide malabsorption      Localized osteoarthrosis not specified whether primary or secondary, hand 2008     Low back pain with bilateral sciatica      Osteopenia      Osteoporosis, unspecified      Palpitations      Urinary incontinence        PREVIOUS SURGERIES:  Past Surgical History:   Procedure Laterality Date     BIOPSY OF BREAST, INCISIONAL  1985     C APPENDECTOMY  1975     C LIGATE FALLOPIAN TUBE  1975     COLONOSCOPY  11/14/2008     ALLERGIES:     Allergies   Allergen Reactions     Penicillins Rash     Shellfish-Derived Products Itching and Rash     Sulfacetamide Rash       PERTINENT MEDICATIONS:    Current Outpatient Medications:      Acetaminophen (TYLENOL PO), Take 325 mg by mouth every 4 hours as needed for mild pain or fever, Disp: , Rfl:      alendronate (FOSAMAX) 70 MG tablet, Take 1 tablet (70 mg) by mouth every 7 days, Disp: 12 tablet, Rfl: 3     atorvastatin (LIPITOR) 40 MG tablet, Take 1 tablet (40 mg) by mouth daily, Disp: 90 tablet,  Rfl: 3     Blood Pressure Monitor KIT, 1 Device daily, Disp: 1 kit, Rfl: 1     Calcium Carbonate-Vit D-Min (CALCIUM 1200 PO), Take 1,000 Units by mouth 1 tab daily, Disp: , Rfl:      Cholecalciferol (VITAMIN D) 1000 UNITS capsule, Take 5,000 Units by mouth daily , Disp: , Rfl:      Coenzyme Q10 (COQ10) 200 MG CAPS, Take 200 mg by mouth daily, Disp: , Rfl:      [employer] OMEGA-3 KRILL  MG CAPS, Take  by mouth., Disp: , Rfl:      Flaxseed Oil (LINSEED OIL) OIL, , Disp: , Rfl:      Flaxseed, Linseed, POWD, Take 1-2 tsp by mouth daily., Disp: , Rfl:      fluocinonide (LIDEX) 0.05 % ointment, Apply topically 2 times daily, Disp: , Rfl:      metoprolol succinate ER (TOPROL-XL) 25 MG 24 hr tablet, Take 1 tablet (25 mg) by mouth daily, Disp: 90 tablet, Rfl: 3     Omega-3 Fatty Acids ([employer] NATURAL FISH OIL) 1000 MG CAPS, Take 1,400 mg by mouth daily, Disp: , Rfl:      sertraline (ZOLOFT) 50 MG tablet, Take 1/2 tablet (25mg) every morning for a week then increase to 1 tablet (50mg) every morning., Disp: 30 tablet, Rfl: 1     TRAZODONE HCL PO, Take 100 mg by mouth At Bedtime, Disp: , Rfl:      triamcinolone (KENALOG) 0.5 % OINT ointment, Apply to affected area twice a day, when rash is active, Disp: 30 g, Rfl: 0     TURMERIC PO, , Disp: , Rfl:      albuterol (PROAIR RESPICLICK) 108 (90 Base) MCG/ACT inhaler, Inhale 2 puffs into the lungs every 6 hours as needed for shortness of breath / dyspnea or wheezing (Patient not taking: Reported on 3/4/2019), Disp: 1 Inhaler, Rfl: 1     Albuterol (VENTOLIN IN), 2-4 puffs every 4-6 hours as needed.Shannon Dao LPN 3:39 PM on 10/29/2018, Disp: , Rfl:      Biotin 2500 MCG CAPS, Take 5,000 mcg/day by mouth, Disp: , Rfl:      esomeprazole (NEXIUM) 40 MG DR capsule, Take 1 capsule (40 mg) by mouth every morning (before breakfast) Take 30-60 minutes before eating. (Patient not taking: Reported on 3/4/2019), Disp: 90 capsule, Rfl: 3     fluticasone (FLONASE) 50 MCG/ACT nasal spray, Spray 1  spray into both nostrils daily (Patient not taking: Reported on 3/4/2019), Disp: 1 Bottle, Rfl: 3     ibuprofen (ADVIL/MOTRIN) 200 MG capsule, Take 200 mg by mouth every 4 hours as needed for fever, Disp: , Rfl:      mometasone furoate (ASMANEX HFA) 200 MCG/ACT inhaler, Inhale 2 puffs into the lungs 2 times daily (Patient not taking: Reported on 3/4/2019), Disp: 3 Inhaler, Rfl: 3    SOCIAL HISTORY:  Social History     Socioeconomic History     Marital status:      Spouse name: Bonilla     Number of children: 5     Years of education: Not on file     Highest education level: Not on file   Occupational History     Occupation:      Employer: RETIRED     Comment: /social service for [employer]     Employer: [employer]   Social Needs     Financial resource strain: Not on file     Food insecurity:     Worry: Not on file     Inability: Not on file     Transportation needs:     Medical: Not on file     Non-medical: Not on file   Tobacco Use     Smoking status: Never Smoker     Smokeless tobacco: Never Used     Tobacco comment: smoke free household.   Substance and Sexual Activity     Alcohol use: Yes     Alcohol/week: 0.6 - 1.2 oz     Types: 1 - 2 Glasses of wine per week     Frequency: 2-4 times a month     Drinks per session: 1 or 2     Comment: up to 3 glasses after dinner every week     Drug use: No     Sexual activity: Yes     Partners: Male   Lifestyle     Physical activity:     Days per week: Not on file     Minutes per session: Not on file     Stress: Not on file   Relationships     Social connections:     Talks on phone: Not on file     Gets together: Not on file     Attends Congregational service: Not on file     Active member of club or organization: Not on file     Attends meetings of clubs or organizations: Not on file     Relationship status: Not on file     Intimate partner violence:     Fear of current or ex partner: Not on file     Emotionally abused: Not on  "file     Physically abused: Not on file     Forced sexual activity: Not on file   Other Topics Concern     Parent/sibling w/ CABG, MI or angioplasty before 65F 55M? No      Service Not Asked     Blood Transfusions Not Asked     Caffeine Concern Not Asked     Comment: Occasional Coffee     Occupational Exposure Not Asked     Hobby Hazards Not Asked     Sleep Concern Not Asked     Stress Concern Not Asked     Weight Concern Not Asked     Special Diet Not Asked     Back Care Not Asked     Exercise Not Asked     Comment: Biking at home, Treadmill - Everyday     Bike Helmet Not Asked     Seat Belt Not Asked     Self-Exams Not Asked   Social History Narrative    Originally from [facility] [place], immigrated . .  5 Children (1st child ). Retired, [employer] clinic helped with mentally ill patients.           FAMILY HISTORY:  FH of CRC: Father, reports was diagnosed with GI cancer ?bowel vs liver? In his 60s.   FH of IBD: None   Family History   Problem Relation Age of Onset     Other - See Comments Mother          during childbirth     Gastrointestinal Disease Father         Stomach/Liver Problems     Other - See Comments Sister          in [facility], presented as bad headache     Mental Illness Sister         ?schizophrenia, paranoid     Vision Loss Sister      Mental Illness Brother      Diabetes Sister      Glaucoma No family hx of      Macular Degeneration No family hx of        Past/family/social history reviewed and no changes    PHYSICAL EXAMINATION:  Constitutional: aaox3, cooperative, pleasant, not dyspneic/diaphoretic, no acute distress  Vitals reviewed: /65 (BP Location: Left arm, Patient Position: Sitting, Cuff Size: Adult Regular)   Pulse (!) 44   Temp 97.4  F (36.3  C) (Oral)   Resp 16   Ht 1.485 m (4' 10.47\")   Wt 59.4 kg (130 lb 14.4 oz)   SpO2 97%   BMI 26.92 kg/m     Wt:   Wt Readings from Last 2 Encounters:   19 59.4 kg (130 lb 14.4 oz)   19 60.1 kg (132 lb 9.6 oz)    "   Eyes: Sclera anicteric/injected  Ears/nose/mouth/throat: Normal oropharynx without ulcers or exudate, mucus membranes moist, hearing intact  Neck: supple, thyroid normal size  CV: No edema  Respiratory: Unlabored breathing  Lymph: No axillary, submandibular, supraclavicular or inguinal lymphadenopathy  Abd: Nondistended, +bs, no hepatosplenomegaly, mildly tender to LLQ, no peritoneal signs  Skin: warm, perfused, no jaundice  Psych: Normal affect  MSK: Normal gait      PERTINENT STUDIES:    Orders Only on 11/28/2018   Component Date Value Ref Range Status     WBC 11/28/2018 4.8  4.0 - 11.0 10e9/L Final     RBC Count 11/28/2018 4.82  3.8 - 5.2 10e12/L Final     Hemoglobin 11/28/2018 12.7  11.7 - 15.7 g/dL Final     Hematocrit 11/28/2018 40.7  35.0 - 47.0 % Final     MCV 11/28/2018 84  78 - 100 fl Final     MCH 11/28/2018 26.3* 26.5 - 33.0 pg Final     MCHC 11/28/2018 31.2* 31.5 - 36.5 g/dL Final     RDW 11/28/2018 14.3  10.0 - 15.0 % Final     Platelet Count 11/28/2018 261  150 - 450 10e9/L Final     Hemoglobin A1C 11/28/2018 5.7* 0 - 5.6 % Final           Again, thank you for allowing me to participate in the care of your patient.      Sincerely,    Urban Pascal PA-C

## 2019-03-06 ASSESSMENT — PATIENT HEALTH QUESTIONNAIRE - PHQ9: SUM OF ALL RESPONSES TO PHQ QUESTIONS 1-9: 9

## 2019-03-08 ENCOUNTER — TELEPHONE (OUTPATIENT)
Dept: INTERNAL MEDICINE | Facility: CLINIC | Age: 79
End: 2019-03-08

## 2019-03-08 NOTE — TELEPHONE ENCOUNTER
[facility] Call Center    Phone Message    May a detailed message be left on voicemail: yes    Reason for Call: Medication Question or concern regarding medication   Prescription Clarification  Name of Medication: OMEGA-3 KRILL  MG CAPS  Prescribing Provider: Ling Luna   What on the order needs clarification? Pts daughter had bought Pt Omega-3 700mg tablets, Pt is wanting to know if Pt is able to take these. Pt would like a call back from Ling Mackay or Nurse      Action Taken: Message routed to:  [facility]   Pt called and informed that it is ok to take the Omega-3 700mg. Clinic numbers given for questions or concerns.  Arin Turner RN 11:20 AM on 3/14/2019.        Will send to Dr Luna for answer.  Arin Turner RN 8:32 AM on 3/11/2019.

## 2019-03-13 ENCOUNTER — OFFICE VISIT (OUTPATIENT)
Dept: PSYCHIATRY | Facility: CLINIC | Age: 79
End: 2019-03-13
Attending: NURSE PRACTITIONER
Payer: COMMERCIAL

## 2019-03-13 ENCOUNTER — ALLIED HEALTH/NURSE VISIT (OUTPATIENT)
Dept: PSYCHIATRY | Facility: CLINIC | Age: 79
End: 2019-03-13
Attending: SOCIAL WORKER
Payer: COMMERCIAL

## 2019-03-13 VITALS
DIASTOLIC BLOOD PRESSURE: 80 MMHG | SYSTOLIC BLOOD PRESSURE: 149 MMHG | BODY MASS INDEX: 26.9 KG/M2 | HEART RATE: 50 BPM | WEIGHT: 130.8 LBS

## 2019-03-13 DIAGNOSIS — Z71.89 COUNSELING AND COORDINATION OF CARE: Primary | ICD-10-CM

## 2019-03-13 DIAGNOSIS — F41.9 ANXIETY: ICD-10-CM

## 2019-03-13 PROCEDURE — G0463 HOSPITAL OUTPT CLINIC VISIT: HCPCS | Mod: ZF

## 2019-03-13 RX ORDER — GABAPENTIN 100 MG/1
100-200 CAPSULE ORAL AT BEDTIME
Qty: 60 CAPSULE | Refills: 0 | Status: SHIPPED | OUTPATIENT
Start: 2019-03-13 | End: 2019-09-12

## 2019-03-13 RX ORDER — SERTRALINE HYDROCHLORIDE 100 MG/1
100 TABLET, FILM COATED ORAL DAILY
Qty: 30 TABLET | Refills: 0 | Status: SHIPPED | OUTPATIENT
Start: 2019-03-13 | End: 2019-04-16

## 2019-03-13 ASSESSMENT — PAIN SCALES - GENERAL: PAINLEVEL: NO PAIN (1)
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
## 2019-03-13 NOTE — PATIENT INSTRUCTIONS
Thank you for coming to the PSYCHIATRY CLINIC.    Lab Testing:  If you had lab testing today and your results are reassuring or normal they will be mailed to you or sent through [employer] within 7 days.   If the lab tests need quick action we will call you with the results.  The phone number we will call with results is # [phone] (home) . If this is not the best number please call our clinic and change the number.    Medication Refills:  If you need any refills please call your pharmacy and they will contact us. Our fax number for refills is [phone]. Please allow three business for refill processing.   If you need to  your refill at a new pharmacy, please contact the new pharmacy directly. The new pharmacy will help you get your medications transferred.     Scheduling:  If you have any concerns about today's visit or wish to schedule another appointment please call our office during normal business hours [phone] (8-5:00 M-F)    Contact Us:  Please call [phone] during business hours (8-5:00 M-F).  If after clinic hours, or on the weekend, please call  [phone].    Financial Assistance [phone]  [facility] Billing [phone]  [facility] Billing [phone]  Medical Records [phone]      MENTAL HEALTH CRISIS NUMBERS:  [facility]:   [facility] - [phone]   Crisis Residence [facility] - [phone]   Walk-In Counseling [facility] [phone]   COPE 24/7 [place] Mobile Team for Adults - [[phone]]; Child - [[phone]]     Crisis Connection - [phone]     [facility]:   [facility] - [phone]   Walk-in counseling [facility] - [phone]   Walk-in counseling [facility] - [phone]   Crisis Residence [facility] - [phone]   Urgent Care Adult Mental Health:   --Drop-in, 24/7 crisis line, and [place] Mobile Team [[phone]]    CRISIS TEXT  LINE: Text [phone] from anywhere, anytime, any crisis 24/7;    OR SEE www.crisistextline.org     Poison Control Center - [phone]    CHILD: [facility] needs assessment team - [phone]     [facility] - [phone]; or [facility] - [phone]    If you have a medical emergency please call 911or go to the nearest ER.                    _____________________________________________    Again thank you for choosing PSYCHIATRY CLINIC and please let us know how we can best partner with you to improve you and your family's health.  You may be receiving a survey in the mail regarding this appointment. We would love to have your feedback, both positive and negative, so please fill out the survey and return it using the provided envelope. The survey is done by an external company, so your answers are anonymous.

## 2019-03-13 NOTE — PROGRESS NOTES
Social Work Consultation  [facility]      Patient Name:  Alena Coronel  /Age:  1940 (78 year old)    Presenting SW Need(s)/ Reason for visit:  Referral from Riky Hess stating patient has requested assistance in contacting the Health Psychology Department and locate an individual therapist.      Collaborated With:    -YAZAN Downey    Intervention:  Writer met with patient, eliciting information and completing needs assessment.  Patient reported she needed assistance with contacting Health Psychology Department.  Patient had contacted previously, however had not heard back.  Writer assisted patient in calling and leaving message for Dr. Rita King at the Health Psychology Department.  In addition patient requested assistance in contacting potential individual therapist.  Writer assisted in locating, calling, and leaving messages with 3 potential therapist in patient's geographical area.    Resources Provided:  -Dr. Rita King, Health Psychology, [phone]   -Delia Pond, therapist, [phone]  -Dr. Jaqueline Garcia, [facility], [phone]    Social Work Assessment:  Patient presented neat and friendly.  Patient was engaged in conversation, answering and asking questions.  She was receptive to assistance from SW Intern.    Plan:  SW Intern will follow-up with patient in 2 weeks to confirm therapist has been secured and to assess for ongoing needs.      Will route to patient's psychiatric provider(s) as an FYI.    ABI Garcia  Social Work Intern    This is a non-billable encounter as it was solely for the purposes of outreach and/or care coordination.

## 2019-03-19 ENCOUNTER — TELEPHONE (OUTPATIENT)
Dept: PSYCHIATRY | Facility: CLINIC | Age: 79
End: 2019-03-19

## 2019-03-19 NOTE — TELEPHONE ENCOUNTER
Viviana Velazquez, Viviana Lemons, RN   Phone Number: [phone]          Previous Messages      ----- Message -----   From: Jana Beckett   Sent: 3/18/2019   2:34 PM   To: Francia Sandhu RN   Subject: Please Call Back                                 Pt would like a call back regarding her medication

## 2019-03-26 ENCOUNTER — TELEPHONE (OUTPATIENT)
Dept: PSYCHIATRY | Facility: CLINIC | Age: 79
End: 2019-03-26

## 2019-03-26 NOTE — TELEPHONE ENCOUNTER
Social Work   Incoming/Outgoing Call  [facility]    Outgoing Call To:  Alena Coronel, patient    Reason for Call:  Follow-up in regards to securing therapist    Response/Plan:  Writer spoke to patient and elicited information in regards to a scheduled appointment with Dr. Rita King, Health Psychology, tomorrow.  Writer will follow-up with patient after appointment to assist patient in determining if the therapist is a good match.    Will route to patient's current psychiatric provider(s) as an FYI.   Please call or EPIC message with any questions or concerns.    Kriss Paulino, ABI  Social Work Intern  [phone]

## 2019-03-27 ENCOUNTER — OFFICE VISIT (OUTPATIENT)
Dept: PSYCHOLOGY | Facility: CLINIC | Age: 79
End: 2019-03-27
Attending: INTERNAL MEDICINE
Payer: COMMERCIAL

## 2019-03-27 DIAGNOSIS — F33.9 MDD (MAJOR DEPRESSIVE DISORDER), RECURRENT EPISODE (H): Primary | ICD-10-CM

## 2019-03-27 DIAGNOSIS — F41.9 ANXIETY DISORDER: ICD-10-CM

## 2019-04-03 ENCOUNTER — TELEPHONE (OUTPATIENT)
Dept: PSYCHIATRY | Facility: CLINIC | Age: 79
End: 2019-04-03

## 2019-04-03 NOTE — TELEPHONE ENCOUNTER
Social Work   Incoming/Outgoing Call  [facility]      Outgoing Call To: francisca Carvajal    Reason for Call:  Follow-up to determine if therapy referral was a good match.    Response/Plan:  Writer elicited information about patient's satisfaction with therapy referral to Dr. Rita King.  Patient disclosed Dr. King was late, however the session went fine.  Writer inquired if patient scheduled next visit and she had not.  Writer offered to assist in making the call to Dr. King, which patient accepted.  Patient left message requesting to schedule another session.  Writer will follow-up next week with patient to ensure therapy was scheduled.      Will route to patient's current psychiatric provider(s) as an FYI.   Please call or EPIC message with any questions or concerns.    ABI Garcia  Social Work Intern  ([phone])

## 2019-04-06 DIAGNOSIS — F41.9 ANXIETY: ICD-10-CM

## 2019-04-08 ASSESSMENT — PATIENT HEALTH QUESTIONNAIRE - PHQ9: SUM OF ALL RESPONSES TO PHQ QUESTIONS 1-9: 10

## 2019-04-08 NOTE — TELEPHONE ENCOUNTER
Medication requested: ZOLOFT 50 MG TAB  Last refilled: 3/10/19  Qty: 30      Last seen: 3/13/19  RTC: 1 MONTH  Cancel: 0  No-show: 0  Next appt: 4/16/19    Refill decision:   Refilled for 30 days per protocol.
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
## 2019-04-16 ENCOUNTER — OFFICE VISIT (OUTPATIENT)
Dept: PSYCHIATRY | Facility: CLINIC | Age: 79
End: 2019-04-16
Attending: NURSE PRACTITIONER
Payer: COMMERCIAL

## 2019-04-16 ENCOUNTER — TELEPHONE (OUTPATIENT)
Dept: PSYCHIATRY | Facility: CLINIC | Age: 79
End: 2019-04-16

## 2019-04-16 VITALS
DIASTOLIC BLOOD PRESSURE: 71 MMHG | WEIGHT: 124.8 LBS | SYSTOLIC BLOOD PRESSURE: 138 MMHG | HEART RATE: 47 BPM | BODY MASS INDEX: 25.67 KG/M2

## 2019-04-16 DIAGNOSIS — F41.9 ANXIETY: ICD-10-CM

## 2019-04-16 PROCEDURE — G0463 HOSPITAL OUTPT CLINIC VISIT: HCPCS | Mod: ZF

## 2019-04-16 RX ORDER — SERTRALINE HYDROCHLORIDE 100 MG/1
100 TABLET, FILM COATED ORAL DAILY
Qty: 30 TABLET | Refills: 0 | Status: SHIPPED | OUTPATIENT
Start: 2019-04-16 | End: 2019-05-14

## 2019-04-16 RX ORDER — TRAZODONE HYDROCHLORIDE 100 MG/1
100 TABLET ORAL AT BEDTIME
Qty: 30 TABLET | Refills: 1 | Status: SHIPPED | OUTPATIENT
Start: 2019-04-16 | End: 2019-05-14

## 2019-04-16 ASSESSMENT — PAIN SCALES - GENERAL: PAINLEVEL: SEVERE PAIN (7)

## 2019-04-16 NOTE — NURSING NOTE
Chief Complaint   Patient presents with     Recheck Medication     Anxiety     Pt reports feeling dizzy.  Provider was alerted of Low Pulse. Isela Sanchez

## 2019-04-16 NOTE — TELEPHONE ENCOUNTER
Social Work   Incoming/Outgoing Call  [facility]      Outgoing Call To: Alena Coronel, patient    Reason for Call: Patient left a message requesting a return call.    Response/Plan:  Writer spoke to patient who expressed frustration with Dr Rita King, as she has not been very responsive to patient's messages and scheduling a follow-up appointment.  Writer encouraged patient and offered to contact Dr. King together, however patient at this time does not want to proceed with therapy.  Writer explained finding a therapist can take a couple of attempts and offered to contact a couple of new therapists.  Patient does not want to pursue options at this time.    Writer encouraged patient to reach out to SW Department if she would like assistance in the future in contacting a therapist or if she has any other needs.      Will route to patient's current psychiatric provider(s) as an FYI.   Please call or EPIC message with any questions or concerns.    ABI Garcia  Social Work Intern  ([phone])
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
## 2019-04-16 NOTE — PATIENT INSTRUCTIONS
Thank you for coming to the PSYCHIATRY CLINIC.    Lab Testing:  If you had lab testing today and your results are reassuring or normal they will be mailed to you or sent through [employer] within 7 days.   If the lab tests need quick action we will call you with the results.  The phone number we will call with results is # [phone] (home) . If this is not the best number please call our clinic and change the number.    Medication Refills:  If you need any refills please call your pharmacy and they will contact us. Our fax number for refills is [phone]. Please allow three business for refill processing.   If you need to  your refill at a new pharmacy, please contact the new pharmacy directly. The new pharmacy will help you get your medications transferred.     Scheduling:  If you have any concerns about today's visit or wish to schedule another appointment please call our office during normal business hours [phone] (8-5:00 M-F)    Contact Us:  Please call [phone] during business hours (8-5:00 M-F).  If after clinic hours, or on the weekend, please call  [phone].    Financial Assistance [phone]  [employer] Billing [phone]  [employer] Billing [phone]  Medical Records [phone]      MENTAL HEALTH CRISIS NUMBERS:  [facility]:   [facility] - [phone]   Crisis Residence [facility] - [phone]   Walk-In Counseling [facility] [phone]   COPE 24/7 [place] Mobile Team for Adults - [[phone]]; Child - [[phone]]        [facility]:   [facility] - [phone]   Walk-in counseling [facility] - [phone]   Walk-in counseling [facility] - [phone]   Crisis Residence [facility] - [phone]   Urgent Care Adult Mental Health:   --Drop-in, 24/7 crisis line, and [employer] Mobile Team [[phone]]    CRISIS TEXT LINE: Text [phone] from anywhere,  anytime, any crisis 24/7;    OR SEE [email or web address].org     Poison Control Center - [phone]    CHILD: [facility] needs assessment team - [phone]     [facility] - [phone]; or [employer] [facility] - [phone]    If you have a medical emergency please call 911or go to the nearest ER.                    _____________________________________________    Again thank you for choosing PSYCHIATRY CLINIC and please let us know how we can best partner with you to improve you and your family's health.  You may be receiving a survey in the mail regarding this appointment. We would love to have your feedback, both positive and negative, so please fill out the survey and return it using the provided envelope. The survey is done by an external company, so your answers are anonymous.

## 2019-04-17 ASSESSMENT — PATIENT HEALTH QUESTIONNAIRE - PHQ9: SUM OF ALL RESPONSES TO PHQ QUESTIONS 1-9: 8

## 2019-04-18 ENCOUNTER — OFFICE VISIT (OUTPATIENT)
Dept: PSYCHOLOGY | Facility: CLINIC | Age: 79
End: 2019-04-18
Payer: COMMERCIAL

## 2019-04-18 DIAGNOSIS — F43.12 CHRONIC POSTTRAUMATIC STRESS DISORDER: Primary | ICD-10-CM
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
## 2019-05-07 ENCOUNTER — OFFICE VISIT (OUTPATIENT)
Dept: INTERNAL MEDICINE | Facility: CLINIC | Age: 79
End: 2019-05-07
Payer: COMMERCIAL

## 2019-05-07 VITALS
HEART RATE: 48 BPM | OXYGEN SATURATION: 99 % | SYSTOLIC BLOOD PRESSURE: 168 MMHG | BODY MASS INDEX: 27.11 KG/M2 | WEIGHT: 131.8 LBS | RESPIRATION RATE: 16 BRPM | DIASTOLIC BLOOD PRESSURE: 76 MMHG

## 2019-05-07 DIAGNOSIS — R68.2 DRY MOUTH: ICD-10-CM

## 2019-05-07 DIAGNOSIS — I10 ESSENTIAL HYPERTENSION: Primary | ICD-10-CM

## 2019-05-07 DIAGNOSIS — R61 NIGHT SWEATS: ICD-10-CM

## 2019-05-07 DIAGNOSIS — F43.23 ADJUSTMENT DISORDER WITH MIXED ANXIETY AND DEPRESSED MOOD: ICD-10-CM

## 2019-05-07 DIAGNOSIS — M81.0 AGE-RELATED OSTEOPOROSIS WITHOUT CURRENT PATHOLOGICAL FRACTURE: ICD-10-CM

## 2019-05-07 LAB
ERYTHROCYTE [DISTWIDTH] IN BLOOD BY AUTOMATED COUNT: 14.8 % (ref 10–15)
HCT VFR BLD AUTO: 40.6 % (ref 35–47)
HGB BLD-MCNC: 13.3 G/DL (ref 11.7–15.7)
MCH RBC QN AUTO: 27.7 PG (ref 26.5–33)
MCHC RBC AUTO-ENTMCNC: 32.8 G/DL (ref 31.5–36.5)
MCV RBC AUTO: 85 FL (ref 78–100)
PLATELET # BLD AUTO: 211 10E9/L (ref 150–450)
RBC # BLD AUTO: 4.8 10E12/L (ref 3.8–5.2)
WBC # BLD AUTO: 6.2 10E9/L (ref 4–11)

## 2019-05-07 RX ORDER — HYDROCHLOROTHIAZIDE 25 MG/1
12.5 TABLET ORAL DAILY
Qty: 35 TABLET | Refills: 1 | Status: SHIPPED | OUTPATIENT
Start: 2019-05-07 | End: 2019-05-07

## 2019-05-07 RX ORDER — HYDROCHLOROTHIAZIDE 25 MG/1
25 TABLET ORAL DAILY
Qty: 35 TABLET | Refills: 1 | Status: SHIPPED | OUTPATIENT
Start: 2019-05-07 | End: 2019-07-07

## 2019-05-07 ASSESSMENT — PAIN SCALES - GENERAL: PAINLEVEL: MODERATE PAIN (5)

## 2019-05-07 NOTE — NURSING NOTE
"Chief Complaint   Patient presents with     Perspiration     pt is here to discuss night sweats x 1 months      Mouth Problem     pt is here to discuss dry mouth x 1 month       Vital signs:      BP: 162/70 Pulse: (!) 48   Resp: 16 SpO2: 99 %       Weight: 59.8 kg (131 lb 12.8 oz)  Estimated body mass index is 27.11 kg/m  as calculated from the following:    Height as of 3/4/19: 1.485 m (4' 10.47\").    Weight as of this encounter: 59.8 kg (131 lb 12.8 oz).      Milana Thomas CMA at 7:49 AM on 5/7/2019    "

## 2019-05-07 NOTE — PATIENT INSTRUCTIONS
Dear Mrs. Coronel,     - Please start taking hydrochlorothiazide 25 mg daily.   - Stop taking metoprolol.   - Please go downstairs for blood work.     Please return to clinic in 3 months so that we can recheck blood pressure.    Joelle Owens MD MPH          Primary Care Center Phone Number [phone]  Primary Care Center Medication Refill Request Information:  * Please contact your pharmacy regarding ANY request for medication refills.  ** [facility] Prescription Fax = [phone]  * Please allow 3 business days for routine medication refills.  * Please allow 5 business days for controlled substance medication refills.     Primary Care Center Test Result notification information:  *You will be notified with in 7-10 days of your appointment day regarding the results of your test.  If you are on MyChart you will be notified as soon as the provider has reviewed the results and signed off on them.               [facility]         Internal Medicine Resident                   Continuity Clinic    Who We Are    Resident Continuity Clinic is a part of the [facility] Primary [facility].  Resident physicians see patients independently and establish a relationship with them over the course of their three-year residency program.  As with the Primary Care Clinic, our Resident Continuity Clinic models a group practice.  If your doctor is not available, you will be able to see another resident physician.  At the end of a resident s training, patients will be transitioned to a new resident physician for ongoing care.     We treat patients with a wide array of medical needs from routine physicals, to acute illnesses, to diabetes and blood pressure management, to complex medical illness.  What is a Resident Physician?    Resident physicians hold medical degrees and are doctors. They are training to become specialists in Internal Medicine. They work under the supervision of board-certified faculty  physicians.  Expectations for Your Care    We strive to provide accessible, quality care at all times.    In order to provide this care, it is best to see your primary care resident doctor consistently rather switching between providers.  In the event you do see another physician, you should schedule a follow-up visit with your usual primary care doctor.    If you are transitioning your care from another clinic, it is helpful to have your records available for your doctor to review.    We do not prescribe controlled substances, such as ADD medications or narcotic pain medications, on your first visit.  We will review your health records and concerns prior to devising a treatment plan with you in order to provide the best care.      Clinic Services     Extended clinic hours; patient  to help navigate your visit;  parking; laboratory and imaging services with evening and weekend hours    Multiple medical and surgical specialties in one building    Complementary services, including Nutrition, Integrative Medicine, Pharmacy consultations, Mental and Behavioral Health, Sports Medicine and Physical Therapy    Thank You    We would like to thank you for choosing the [facility] Internal Medicine Resident Continuity Clinic for your primary care. You are making a priceless contribution to the training of the next generation of health care practitioners.     Contact us at [phone] for appointments or questions.    Resident Clinic Hours are Tuesdays and Thursdays, 7:30am-5:00pm    Residents   Brodie Plummer MD  (Male)   Liya Wolfe MD (Female)  Milana Harper MD   (Female)   Sydnee Truong MD   (Female)   Ayo Santiago MD  (Male)   Jacobo Odonnell MD  (Male)   Zuleika Ibarra MD    (Female)   Diogenes Gresham MD  (Male)   Yasmany Randolph MD  (Male)    Joelle Jaramillo MD  (Female)   Chilango Nguyen MD  (Male)   Kindra Gaytan MD  (Female)   Jenny Henry MD   (Female)   Joe Ansari,  MD  (Male)   Puma Mckenzie MD  (Male)   Jacobo Schwab MD (Male)   Arvin Calzada MD  (Male)   Arianna Solis MD (Female)    Alis Kim MD (Female)   Marline Sr MD  (Male)   Viki Carson MD    (Female)   Josie Oliva MD  (Female)    Supervising Physicians   Ling Luna, MD Nicky Meyer, MD Thien Locke, MD Krzysztof Pham, MD Dia Balderrama, MD Krupa Gonzales, MD Haja Branch, MD Lanette Berger, MD Shirley Ferris MD

## 2019-05-07 NOTE — PROGRESS NOTES
PRIMARY CARE CENTER       SUBJECTIVE:  Alena Coronel is a 78 year old female with a PMHx of history of depression, anxiety, hypertension, hyperlipidemia, chronic back pain, osteoporosis, and insomnia, who returns for follow up. Chief complaint today is dry mouth and night sweats.     Issues reviewed:  Stress/ Anxiety/ Depression: Patient reports stress at home. Patient's  has advanced dementia and she has stress with her 's behaviors at home.  interferes with her household chores and meal preparation.  had a recent fall in the home and is still recovering from that. He was evaluated in ED s/p fall and cleared to go home. She states she is safe. She has support from her adult children. She has been meeting with a health psychologist in regards to depression and anxiety, and has been taking sertraline 150 mg daily, dose last increased 4/16/19.     Bones: Her bone density T-score in 2008 was -2.5, indicative of osteoporosis. Her most recent dexa scan was completed on 9/21 which showed osteopenia at the lumbar spine (-2.2). She does her best to get exercise, most activity is walking and household chores. Is not able to eat dairy as she is lactose intolerant. She is taking Alendronate, 1200 mg of calcium daily, and 6000 units of Vitamin D3 daily.      Insomnia: Is taking 100 mg Trazodone but notes this is not totally effective for managing her sleep. Has been meeting with a health psychologist to discuss this.        Dry mouth: For several weeks. Reports adequate PO intake throughout the day. Denies excessive caffeine.     Night sweats: For the past several weeks. Does not wake up with clothes drenched in sweat. Menopause was at age 61.       Medications and allergies reviewed by me today.       ROS:   14 point ROS is negative except for that noted in HPI    OBJECTIVE:    /76   Pulse (!) 48   Resp 16   Wt 59.8 kg (131 lb 12.8 oz)   SpO2 99%   BMI 27.11 kg/m      Wt Readings from Last 1 Encounters:   05/07/19 59.8 kg (131 lb 12.8 oz)       GENERAL APPEARANCE: healthy, alert and no distress     EYES: EOMI, PERRL     HENT: moist mucous membranes. ear canals and TM's normal and nose and mouth without ulcers or lesions     NECK: no enlarged submandibular, axillary, supraclavicular, or inguinal lymph nodes, no asymmetry, masses, or scars and thyroid normal      RESP: lungs clear to auscultation - no rales, rhonchi or wheezes     CV: regular rates and rhythm, normal S1 S2, no S3 or S4 and no murmur, click or rub     ABDOMEN:  soft, nontender, no HSM or masses and bowel sounds normal     MS: extremities normal- no gross deformities noted, no evidence of inflammation in joints, FROM in all extremities.     SKIN: no suspicious lesions or rashes     NEURO: Normal strength and tone, sensory exam grossly normal, mentation intact and speech normal     PSYCH: mentation appears normal. and affect normal/bright     LYMPHATICS: No cervical adenopathy     ASSESSMENT/PLAN:  Alena Coronel is a 78 year old female with a PMHx of history of depression, anxiety, hypertension, hyperlipidemia, chronic back pain, osteoporosis, and insomnia, who returns for follow up. Chief complaint today is dry mouth and night sweats.     Diagnoses and all orders for this visit:    Hypertension  Bradycardia  Blood pressure is 168/76 today. Patient was previously on hydrochlorothiazide, but on chart review this was discontinued one year ago and she was started on metoprolol 25 mg daily, which patient is currently taking. She does not recall why hydrochlorothiazide was discontinued and instead metoprolol was started in 12/2018. She was seen by a Cardiologist at [facility] on 6/1/18. She had a Holter monitor in July of 2016 which showed heart rates varying between 35 and 94 with the average heart rate of 50, and has a diagnosis of bradycardia. No mention of metoprolol in 6/1/18 cardiology note. Pulse today in  clinic is 48.   -     Stop taking metoprolol   -     hydrochlorothiazide (HYDRODIURIL) 25 MG tablet; Take 1 tablets (25 mg) by mouth daily    Night sweats  Dry mouth  For several weeks duration, mild, does not wake up drenched in sweat. No other associated symptoms or infectious sx. No hx of positive PPD. On physical exam, no enlarged submandibular, axillary, supraclavicular, or inguinal lymph nodes. Dry mouth of same duration as night sweats. Potentially related to serotonin specific reuptake inhibitor; on 4/16 dose of sertraline was increased from 100 mg to 150 mg, patient has confirmed she is taking the higher dose.   -     CBC normal  -     Follow up in 2-3 months and further workup if persistent symptoms  -     Encouraged adequate hydration    Health maintenance        -    Hyperlipidemia: continue atorvastatin. Recheck lipids at next visit in 3 months/ September 2019        -    Had first in series of shingles vaccines, will get 2nd in October         -    Depression: continue sertraline        -    Insomnia: continue gabapentin        -    Next Tdap 2022        -    Next colonoscopy 2023        -    Osteoporosis: continue alendronate, 1200 mg of calcium daily, and 6000 units of Vitamin D3 daily       Pt should return to clinic for f/u within 2-3 months.     Joelle Jaramillo MD MPH  PGY-4 Internal Medicine  May 7, 2019    Pt was seen and plan of care discussed with Dr. BLANCA Luna     Teaching Physician Note:  I was present during the visit and the patient was seen and examined by me.   I discussed the case with the resident and agree with the note as documented by the resident with the following exceptions:  None.    Ling Luna M.D.  Internal Medicine   pager [phone]

## 2019-05-14 ENCOUNTER — OFFICE VISIT (OUTPATIENT)
Dept: PSYCHIATRY | Facility: CLINIC | Age: 79
End: 2019-05-14
Attending: NURSE PRACTITIONER
Payer: COMMERCIAL

## 2019-05-14 VITALS
HEART RATE: 68 BPM | WEIGHT: 130 LBS | SYSTOLIC BLOOD PRESSURE: 155 MMHG | DIASTOLIC BLOOD PRESSURE: 79 MMHG | BODY MASS INDEX: 26.74 KG/M2

## 2019-05-14 DIAGNOSIS — F41.9 ANXIETY: ICD-10-CM

## 2019-05-14 PROCEDURE — G0463 HOSPITAL OUTPT CLINIC VISIT: HCPCS | Mod: ZF

## 2019-05-14 RX ORDER — TRAZODONE HYDROCHLORIDE 100 MG/1
100 TABLET ORAL AT BEDTIME
Qty: 90 TABLET | Refills: 0 | Status: SHIPPED | OUTPATIENT
Start: 2019-05-14 | End: 2019-08-23

## 2019-05-14 ASSESSMENT — PAIN SCALES - GENERAL: PAINLEVEL: MODERATE PAIN (4)
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
## 2019-05-14 NOTE — PATIENT INSTRUCTIONS
Thank you for coming to the PSYCHIATRY CLINIC.    Lab Testing:  If you had lab testing today and your results are reassuring or normal they will be mailed to you or sent through [employer] within 7 days.   If the lab tests need quick action we will call you with the results.  The phone number we will call with results is # [phone] (home) . If this is not the best number please call our clinic and change the number.    Medication Refills:  If you need any refills please call your pharmacy and they will contact us. Our fax number for refills is [phone]. Please allow three business for refill processing.   If you need to  your refill at a new pharmacy, please contact the new pharmacy directly. The new pharmacy will help you get your medications transferred.     Scheduling:  If you have any concerns about today's visit or wish to schedule another appointment please call our office during normal business hours [phone] (8-5:00 M-F)    Contact Us:  Please call [phone] during business hours (8-5:00 M-F).  If after clinic hours, or on the weekend, please call  [phone].    Financial Assistance [phone]  [facility] Billing [phone]  Self-A-r-T Billing [phone]  Medical Records [phone]      MENTAL HEALTH CRISIS NUMBERS:  [facility]:   [facility] - [phone]   Crisis Residence [facility] - [phone]   Walk-In Counseling [facility] [phone]   COPE 24/7 [place] Mobile Team for Adults - [[phone]]; Child - [[phone]]        [facility]:   [facility] - [phone]   Walk-in counseling [facility] - [phone]   Walk-in counseling [facility] - [phone]   Crisis Residence [facility] - [phone]   Urgent Care Adult Mental Health:   --Drop-in, 24/7 crisis line, and [place] Mobile Team [[phone]]    CRISIS TEXT LINE: Text [phone] from anywhere,  anytime, any crisis 24/7;    OR SEE www.crisistextline.org     Poison Control Center - [phone]    CHILD: [facility] needs assessment team - [phone]     [facility] - [phone]; or [facility] - [phone]    If you have a medical emergency please call 911or go to the nearest ER.                    _____________________________________________    Again thank you for choosing PSYCHIATRY CLINIC and please let us know how we can best partner with you to improve you and your family's health.  You may be receiving a survey in the mail regarding this appointment. We would love to have your feedback, both positive and negative, so please fill out the survey and return it using the provided envelope. The survey is done by an external company, so your answers are anonymous.

## 2019-05-20 ENCOUNTER — TELEPHONE (OUTPATIENT)
Dept: INTERNAL MEDICINE | Facility: CLINIC | Age: 79
End: 2019-05-20

## 2019-05-20 NOTE — TELEPHONE ENCOUNTER
Patient is going to an urgent care.  Tried to assist her with finding one. She will call the [facility] . Anahi Benson on 5/20/2019 at 4:58 PM

## 2019-05-20 NOTE — TELEPHONE ENCOUNTER
[facility] Call Center    Phone Message    May a detailed message be left on voicemail: yes    Reason for Call: Other: Per Pt, is wanting a sooner apt with [facility] before 6/13/2019, Pt has a scheduled apt on 5/30/2019, but is wanting to be seen before that.  Pt states she has a cough at night that keps her up at night. Pt is wanting a call back to schedule for an apt sooner.     Action Taken: Message routed to:  [facility] ([facility] parul

## 2019-05-29 NOTE — PROGRESS NOTES
"[facility] [place]   Norma NUÑEZSu Ma, APRN CNP  05/30/2019      Chief Complaint:   Cough       History of Present Illness:   Alena Coronel is a 78 year old female with a history of anxiety, GERD, hypertension, and IBS who presents alone for evaluation of a cough. She has had a cough for a month, which is mild during the day and worse at night. Her throat has been itchy and dry and feels irritated. She endorses night sweats for quite some time, work-up for this previously negative. She also endorses wheezing and some difficulty hearing but denies ear pain. She denies fevers or shortness of breath. She denies history of tuberculosis. She has been taking dextromethorphan for the cough, as suggested by the pharmacist, which has been helping moderately. She has also been using a homeopathic \"cough and bronchial\" syrup. She uses her albuterol inhaler once a day and does not know if it helps. She is not using her mometasone inhaler. She is still taking omeprazole, but only as needed.   She is feeling some burning in her throat, not consistently using PPI.    Other concerns discussed:  1. Two days ago, she developed pain in her finger joints which became red and swollen  2. Arthritis pain - is taking calcium and vitamin D     Review of Systems:   Pertinent items are noted in HPI, remainder of complete ROS is negative.      Active Medications:      Acetaminophen (TYLENOL PO), Take 325 mg by mouth every 4 hours as needed for mild pain or fever, Disp: , Rfl:      albuterol (PROAIR RESPICLICK) 108 (90 Base) MCG/ACT inhaler, Inhale 2 puffs into the lungs every 6 hours as needed for shortness of breath / dyspnea or wheezing, Disp: 1 Inhaler, Rfl: 1     Albuterol (VENTOLIN IN), 2-4 puffs every 4-6 hours as needed.Shannon Dao LPN 3:39 PM on 10/29/2018, Disp: , Rfl:      alendronate (FOSAMAX) 70 MG tablet, Take 1 tablet (70 mg) by mouth every 7 days, Disp: 12 tablet, Rfl: 3     atorvastatin (LIPITOR) 40 MG " tablet, Take 1 tablet (40 mg) by mouth daily, Disp: 90 tablet, Rfl: 3     Biotin 2500 MCG CAPS, Take 5,000 mcg/day by mouth, Disp: , Rfl:      Blood Pressure Monitor KIT, 1 Device daily, Disp: 1 kit, Rfl: 1     Calcium Carbonate-Vit D-Min (CALCIUM 1200 PO), Take 1,000 Units by mouth 1 tab daily, Disp: , Rfl:      Cholecalciferol (VITAMIN D) 1000 UNITS capsule, Take 5,000 Units by mouth daily , Disp: , Rfl:      Coenzyme Q10 (COQ10) 200 MG CAPS, Take 200 mg by mouth daily, Disp: , Rfl:      CVS OMEGA-3 KRILL  MG CAPS, Take  by mouth., Disp: , Rfl:      esomeprazole (NEXIUM) 40 MG DR capsule, Take 1 capsule (40 mg) by mouth every morning (before breakfast) Take 30-60 minutes before eating., Disp: 90 capsule, Rfl: 3     Flaxseed Oil (LINSEED OIL) OIL, , Disp: , Rfl:      Flaxseed, Linseed, POWD, Take 1-2 tsp by mouth daily., Disp: , Rfl:      fluocinonide (LIDEX) 0.05 % ointment, Apply topically 2 times daily, Disp: , Rfl:      fluticasone (FLONASE) 50 MCG/ACT nasal spray, Spray 1 spray into both nostrils daily, Disp: 1 Bottle, Rfl: 3     gabapentin (NEURONTIN) 100 MG capsule, Take 1-2 capsules (100-200 mg) by mouth At Bedtime, Disp: 60 capsule, Rfl: 0     hydrochlorothiazide (HYDRODIURIL) 25 MG tablet, Take 1 tablet (25 mg) by mouth daily, Disp: 35 tablet, Rfl: 1     ibuprofen (ADVIL/MOTRIN) 200 MG capsule, Take 200 mg by mouth every 4 hours as needed for fever, Disp: , Rfl:      metoprolol succinate ER (TOPROL-XL) 25 MG 24 hr tablet, Take 1 tablet (25 mg) by mouth daily, Disp: 90 tablet, Rfl: 3     mometasone furoate (ASMANEX HFA) 200 MCG/ACT inhaler, Inhale 2 puffs into the lungs 2 times daily, Disp: 3 Inhaler, Rfl: 3     Omega-3 Fatty Acids (CVS NATURAL FISH OIL) 1000 MG CAPS, Take 1,400 mg by mouth daily, Disp: , Rfl:      Probiotic Product (PROBIOTIC PO), , Disp: , Rfl:      sertraline (ZOLOFT) 100 MG tablet, , Disp: , Rfl: 0     sertraline (ZOLOFT) 50 MG tablet, Take 1.5 tablets (75 mg) by mouth daily,  Disp: 135 tablet, Rfl: 0     traZODone (DESYREL) 100 MG tablet, Take 1 tablet (100 mg) by mouth At Bedtime, Disp: 90 tablet, Rfl: 0     triamcinolone (KENALOG) 0.5 % OINT ointment, Apply to affected area twice a day, when rash is active, Disp: 30 g, Rfl: 0     TURMERIC PO, , Disp: , Rfl:       Allergies:   Penicillins  Shellfish-derived products  Sulfacetamide      Past Medical History:  Adjustment disorder   Anxiety  Chronic cough  Chronic neck pain  Chronic pruitus   Dysphagia  Gastroesophageal reflux disease  Hypertension   Hypokalemia  Insomnia  Intestinal disaccharidase deficiencies and disaccharide malabsorption  Osteoarthritis  Palpitations  Urinary incontinence   Irritable bowel syndrome   Eczema  Idiopathic thrombocytopenia  Immune to hepatitis A  Macular drusen   Posterior vitreous detachment   Venous stasis  Temporomandibular joint syndrome  Lumbago     Past Surgical History:  Biopsy of breast, incisional -   Appendectomy -   Ligate fallopian tube -     Family History:   Stomach/liver problems - father  Mental illness - sister (schizophrenia, paranoid), brother  Visual loss - sister  Mother  during childbirth  Father  in [facility], presented as bad headache      Social History:   The patient is , a nonsmoker, and does consume alcohol (up to 3 glasses of wine after dinner every week).      Physical Exam:   /52 (BP Location: Right arm, Patient Position: Sitting, Cuff Size: Adult Regular)   Pulse 59   Temp 97.4  F (36.3  C) (Oral)   Wt 58.2 kg (128 lb 3.2 oz)   SpO2 97%   BMI 26.37 kg/m     Constitutional: Alert, oriented, pleasant, no acute distress  Head: Normocephalic, atraumatic  Eyes: Extra-ocular movements intact, pupils equally round and reactive bilaterally, no scleral icterus  Ears: tympanic membranes pearly gray with positive light reflex  ENT: Oropharynx clear, moist mucus membranes, good dentition  Neck: Supple, no lymphadenopathy, no thyromegaly  Cardiovascular:  Regular rate and rhythm, no murmurs, rubs or gallops  Respiratory: Good air movement bilaterally, lungs clear, no wheezes/rales/rhonchi  Psychiatric: normal mentation, affect and mood     Assessment and Plan:  1. Mild persistent asthma without complication  She has had a cough for a month as well as a dry, itchy throat. She has been taking dextromethorphan as well as a homeopathic cough and bronchial syrup, which have improved her symptoms moderately. I encouraged her to start taking her mometasone inhaler twice a day as prescribed and take Nexium daily to control her GERD, which may be exacerbating her cough. I also prescribed Singulair for her to take at night to improve her cough and throat itchiness. Additionally, I suggested that she bring her inhalers to her pharmacy and ask them to review and ensure she is using them correctly, or alternatively she can see our pharmacist, Kishore. I also reminded her to rinse her mouth with water after using her inhaler to help prevent thrush.  - montelukast (SINGULAIR) 10 MG tablet  Dispense: 90 tablet; Refill: 3    2. Arthritis  She endorses pain related to this every day. I suggested she try taking Tylenol to provide her with some pain relief as she has not tried this in the past.      Follow-up: Return to clinic if symptoms worsen or fail to improve.          Scribe Disclosure:  We, Iris Mcduffie and Marina Tripathi, are serving as scribes to document services personally performed by YAZAN Lozoya CNP at this visit, based upon the provider's statements to us. All documentation has been reviewed by the aforementioned provider prior to being entered into the official medical record.     Portions of this medical record were completed by a scribe. UPON MY REVIEW AND AUTHENTICATION BY ELECTRONIC SIGNATURE, this confirms (a) I performed the applicable clinical services, and (b) the record is accurate.     YAZAN Lozoya CNP

## 2019-05-30 ENCOUNTER — OFFICE VISIT (OUTPATIENT)
Dept: INTERNAL MEDICINE | Facility: CLINIC | Age: 79
End: 2019-05-30
Payer: COMMERCIAL

## 2019-05-30 VITALS
DIASTOLIC BLOOD PRESSURE: 52 MMHG | WEIGHT: 128.2 LBS | BODY MASS INDEX: 26.37 KG/M2 | OXYGEN SATURATION: 97 % | TEMPERATURE: 97.4 F | HEART RATE: 59 BPM | SYSTOLIC BLOOD PRESSURE: 125 MMHG

## 2019-05-30 DIAGNOSIS — M19.90 ARTHRITIS: ICD-10-CM

## 2019-05-30 DIAGNOSIS — J45.30 MILD PERSISTENT ASTHMA WITHOUT COMPLICATION: Primary | ICD-10-CM

## 2019-05-30 RX ORDER — MONTELUKAST SODIUM 10 MG/1
10 TABLET ORAL AT BEDTIME
Qty: 90 TABLET | Refills: 3 | Status: SHIPPED | OUTPATIENT
Start: 2019-05-30 | End: 2019-09-19

## 2019-05-30 RX ORDER — SERTRALINE HYDROCHLORIDE 100 MG/1
TABLET, FILM COATED ORAL
Refills: 0 | COMMUNITY
Start: 2019-04-18

## 2019-05-30 ASSESSMENT — PAIN SCALES - GENERAL: PAINLEVEL: MODERATE PAIN (5)

## 2019-05-30 NOTE — NURSING NOTE
Chief Complaint   Patient presents with     Cough     pt states she is coughing, more at night than day       Mayra Sinha CMA at 9:56 AM on 5/30/2019.

## 2019-05-30 NOTE — PATIENT INSTRUCTIONS
[facility] Medication Refill Request Information:  * Please contact your pharmacy regarding ANY request for medication refills.  ** [facility] Prescription Fax = [phone]  * Please allow 3 business days for routine medication refills.  * Please allow 5 business days for controlled substance medication refills.     [facility] Test Result notification information:  *You will be notified with in 7-10 days of your appointment day regarding the results of your test.  If you are on MyChart you will be notified as soon as the provider has reviewed the results and signed off on them.    [facility]: [phone]     Use the mometasone (Asmanex) inhaler: 2 puffs, twice a day.    Use the Esomeprazole (Nexium) for acid reflux once daily--take this 30-60 minutes before you eat.    Start the Singulair 10 mg daily at bedtime--this is for chronic cough and allergies that can worsen cough and itchy throat

## 2019-06-03 DIAGNOSIS — I10 ESSENTIAL HYPERTENSION: ICD-10-CM

## 2019-06-04 RX ORDER — HYDROCHLOROTHIAZIDE 25 MG/1
25 TABLET ORAL DAILY
Qty: 90 TABLET | Refills: 1 | OUTPATIENT
Start: 2019-06-04

## 2019-06-07 ASSESSMENT — PATIENT HEALTH QUESTIONNAIRE - PHQ9: SUM OF ALL RESPONSES TO PHQ QUESTIONS 1-9: 2

## 2019-06-25 NOTE — PROGRESS NOTES
Subjective     Alena Coronel is a 78 year old female who presents to clinic today for the following health issues:    HPI   RESPIRATORY SYMPTOMS      Duration: 1 month    Description  sore throat, cough, wheezing, fatigue/malaise and hoarse voice    Severity: moderate    Accompanying signs and symptoms: constipation    History (predisposing factors):  none    Precipitating or alleviating factors: None    Therapies tried and outcome:  rest and fluids , tea, cough drops      Review of Systems   ROS COMP: Constitutional, HEENT, cardiovascular, pulmonary, gi and gu systems are negative, except as otherwise noted.      Objective    /72   Pulse 53   Temp 97.4  F (36.3  C) (Oral)   Resp 20   Wt 59.2 kg (130 lb 9.6 oz)   SpO2 98%   BMI 26.86 kg/m    Body mass index is 26.86 kg/m .  Physical Exam   GENERAL: healthy, alert and no distress  HENT: ear canals and TM's normal, nose and mouth without ulcers or lesions  NECK: no adenopathy, no asymmetry, masses, or scars and thyroid normal to palpation  RESP: lungs clear to auscultation - no rales, rhonchi or wheezes  CV: regular rate and rhythm, normal S1 S2, no S3 or S4, no murmur, click or rub, no peripheral edema and peripheral pulses strong  MS: no gross musculoskeletal defects noted, no edema  SKIN: no suspicious lesions or rashes    Diagnostic Test Results:  Labs reviewed in Epic        Assessment & Plan     1. Cough > 30 days  - azithromycin (ZITHROMAX) 250 MG tablet; Take 2 tablets (500 mg) by mouth daily for 1 day, THEN 1 tablet (250 mg) daily for 4 days.  Dispense: 6 tablet; Refill: 0  - guaiFENesin-codeine (ROBITUSSIN AC) 100-10 MG/5ML solution; Take 5 mLs by mouth every 6 hours as needed for cough  Dispense: 118 mL; Refill: 0    2. HTN, goal below 150/90  - Albumin Random Urine Quantitative with Creat Ratio  - Basic metabolic panel  (Ca, Cl, CO2, Creat, Gluc, K, Na, BUN)     BMI:   Estimated body mass index is 26.86 kg/m  as calculated from the  "following:    Height as of 3/4/19: 1.485 m (4' 10.47\").    Weight as of this encounter: 59.2 kg (130 lb 9.6 oz).       Use medication as directed.  Follow up if symptoms should persist, change or worsen.  Patient amenable to this follow up plan.         No follow-ups on file.    Nigel De La Torre PA-C  [facility]      "

## 2019-06-26 ENCOUNTER — OFFICE VISIT (OUTPATIENT)
Dept: FAMILY MEDICINE | Facility: CLINIC | Age: 79
End: 2019-06-26
Payer: COMMERCIAL

## 2019-06-26 VITALS
BODY MASS INDEX: 26.86 KG/M2 | HEART RATE: 53 BPM | TEMPERATURE: 97.4 F | OXYGEN SATURATION: 98 % | WEIGHT: 130.6 LBS | SYSTOLIC BLOOD PRESSURE: 140 MMHG | RESPIRATION RATE: 20 BRPM | DIASTOLIC BLOOD PRESSURE: 72 MMHG

## 2019-06-26 DIAGNOSIS — I10 HTN, GOAL BELOW 150/90: ICD-10-CM

## 2019-06-26 DIAGNOSIS — R05.9 COUGH: Primary | ICD-10-CM

## 2019-06-26 LAB
ANION GAP SERPL CALCULATED.3IONS-SCNC: 8 MMOL/L (ref 3–14)
BUN SERPL-MCNC: 11 MG/DL (ref 7–30)
CALCIUM SERPL-MCNC: 8.7 MG/DL (ref 8.5–10.1)
CHLORIDE SERPL-SCNC: 98 MMOL/L (ref 94–109)
CO2 SERPL-SCNC: 27 MMOL/L (ref 20–32)
CREAT SERPL-MCNC: 0.79 MG/DL (ref 0.52–1.04)
CREAT UR-MCNC: 29 MG/DL
GFR SERPL CREATININE-BSD FRML MDRD: 73 ML/MIN/{1.73_M2}
GLUCOSE SERPL-MCNC: 90 MG/DL (ref 70–99)
MICROALBUMIN UR-MCNC: <5 MG/L
MICROALBUMIN/CREAT UR: NORMAL MG/G CR (ref 0–25)
POTASSIUM SERPL-SCNC: 3.4 MMOL/L (ref 3.4–5.3)
SODIUM SERPL-SCNC: 133 MMOL/L (ref 133–144)

## 2019-06-26 PROCEDURE — 99214 OFFICE O/P EST MOD 30 MIN: CPT | Performed by: PHYSICIAN ASSISTANT

## 2019-06-26 PROCEDURE — 82043 UR ALBUMIN QUANTITATIVE: CPT | Performed by: PHYSICIAN ASSISTANT

## 2019-06-26 PROCEDURE — 36415 COLL VENOUS BLD VENIPUNCTURE: CPT | Performed by: PHYSICIAN ASSISTANT

## 2019-06-26 PROCEDURE — 80048 BASIC METABOLIC PNL TOTAL CA: CPT | Performed by: PHYSICIAN ASSISTANT

## 2019-06-26 RX ORDER — AZITHROMYCIN 250 MG/1
TABLET, FILM COATED ORAL
Qty: 6 TABLET | Refills: 0 | Status: SHIPPED | OUTPATIENT
Start: 2019-06-26 | End: 2019-07-23

## 2019-06-26 RX ORDER — CODEINE PHOSPHATE AND GUAIFENESIN 10; 100 MG/5ML; MG/5ML
1 SOLUTION ORAL EVERY 6 HOURS PRN
Qty: 118 ML | Refills: 0 | Status: SHIPPED | OUTPATIENT
Start: 2019-06-26

## 2019-07-02 ENCOUNTER — TELEPHONE (OUTPATIENT)
Dept: INTERNAL MEDICINE | Facility: CLINIC | Age: 79
End: 2019-07-02

## 2019-07-02 DIAGNOSIS — I10 ESSENTIAL HYPERTENSION: ICD-10-CM

## 2019-07-02 NOTE — PROGRESS NOTES
"CC:  F/u blood pressure    HPI:    Again language barrier today.  Can't recall the names of her medications nor why she takes them.  Did not take her BP medication this morning.  Only taking hydrochlorothiazide per her report.  Unclear if/when she is taking metoprolol.  I reviewed records over the last year including Care Everywhere.  She has seen multiple providers.    I addressed her language barrier and she agrees to have an  for all future visits with me and other providers.    Events since our last OV in January:    6/7/19 Last OV with  INEZ Castaneda at [facility] in [place] for hypertension and bradycardia.  It appears that Alena has been regularly followed at this clinic.  Note indicates that \"blood pressure and cholesterol have been monitored and controlled\".  Occasionaly extra beats.  Only some lightheadedness if stands up too quickly.    Has  Had Holter monitor (4/2018) which showed HR's varying between 35 and 94 with the average heart rate of 50.    ECHO 7/15/16   1. LV function is hyperdynamic. The visually estimated ejection fraction is >70%.   2. Mild dilation of the proximal ascending aorta. It measures 3.8 cm.   3. Compared to 5/5/16 echo, no significant change.  Med list at [facility] shows no blood pressure medications.      Saw Norma Ma NP 5/30/19 for cough, asthma, encouraged mometasone inhaler bid, nexium for GERD, rx'd Singulair, review inhalers with pharmacist vs. See our clinical pharmacist.  Advised tylenol for arthritic pains.    [facility], Riky Hess NP 5/14/19  Decrease sertraline to 75 mg daily, continue trazodone 100 mg at hs, therapist adivsed    Saw Dr. Billy 5/7/19 ([facility]) for blood pressure  For BP metoprolol d/c'd, started hydrochlorothiazide 25 mg daily  NS's, dry mouth, ?related to serotonin specific reuptake inhibitor,   HCM addressed    Last OV with Rita King PsyD 4/18/19  Advised to f/u 2 weeks.  Dr. King " consulting for Cultural guidance    Me 1/3/19  Language barrier, multiple somatic complaints, anxiety. addressed meds, IBS, bowel regimen, diet, fluid intake, rash, bone cyst, psych , neck pain    BP medication history:   hydrochlorothiazide 25 mg daily, per her report taking daily, but did not take it today for unclear reasons.  Metoprolol XL 25 mg daily (started 12/2018 by Dr. Clemente).  She can't say when she stopped taking it.  Amlodipine d/c'd ?8/2018 (does not recall this medication nor why it was stopped, I reviewed her records but can't figure out why)  Saw cardiologist at [facility] in June, as noted above--no BP medications in the list of BP meds there.  Lisiopril d/c'd ?2/2010 (does not recall this medication nor why it was stopped, I reviewed her records but can't figure out why)    Most recent labs:  Component      Latest Ref Rng & Units 6/26/2019   Sodium      133 - 144 mmol/L 133   Potassium      3.4 - 5.3 mmol/L 3.4   Chloride      94 - 109 mmol/L 98   Carbon Dioxide      20 - 32 mmol/L 27   Anion Gap      3 - 14 mmol/L 8   Glucose      70 - 99 mg/dL 90   Urea Nitrogen      7 - 30 mg/dL 11   Creatinine      0.52 - 1.04 mg/dL 0.79   GFR Estimate      >60 mL/min/1.73:m2 73   GFR Estimate If Black      >60 mL/min/1.73:m2 84   Calcium      8.5 - 10.1 mg/dL 8.7     Patient Active Problem List   Diagnosis     Generalized osteoarthrosis, unspecified site     Irritable bowel syndrome     Adjustment disorder with mixed anxiety and depressed mood     Insomnia     Eczema     Hypertension goal BP (blood pressure) < 140/90     Hyperlipidemia LDL goal <130     Chronic neck pain     Hypokalemia     Osteoporosis     Chest pain     Idiopathic thrombocytopenia (H)     RUQ abdominal pain     Immune to hepatitis A     Dysphagia     GERD (gastroesophageal reflux disease)     Advanced directives, counseling/discussion     Macular drusen     PVD (posterior vitreous detachment)     Venous stasis     Anxiety     Cataract     TMJ  (temporomandibular joint syndrome)     Urinary incontinence     Chronic cough     Digestive problems     Lumbago     Low back pain with bilateral sciatica     Palpitations     Chronic pruritus     Disorder of bone and cartilage     Current Outpatient Medications   Medication Sig Dispense Refill     Acetaminophen (TYLENOL PO) Take 325 mg by mouth every 4 hours as needed for mild pain or fever       albuterol (PROAIR RESPICLICK) 108 (90 Base) MCG/ACT inhaler Inhale 2 puffs into the lungs every 6 hours as needed for shortness of breath / dyspnea or wheezing 1 Inhaler 1     Albuterol (VENTOLIN IN) 2-4 puffs every 4-6 hours as needed.Shannon Dao LPN 3:39 PM on 10/29/2018       alendronate (FOSAMAX) 70 MG tablet Take 1 tablet (70 mg) by mouth every 7 days 12 tablet 3     atorvastatin (LIPITOR) 40 MG tablet Take 1 tablet (40 mg) by mouth daily 90 tablet 3     Biotin 2500 MCG CAPS Take 5,000 mcg/day by mouth       Blood Pressure Monitor KIT 1 Device daily 1 kit 1     Calcium Carbonate-Vit D-Min (CALCIUM 1200 PO) Take 1,000 Units by mouth 1 tab daily       Cholecalciferol (VITAMIN D) 1000 UNITS capsule Take 5,000 Units by mouth daily        Coenzyme Q10 (COQ10) 200 MG CAPS Take 200 mg by mouth daily       [employer] OMEGA-3 KRILL  MG CAPS Take  by mouth.       esomeprazole (NEXIUM) 40 MG DR capsule Take 1 capsule (40 mg) by mouth every morning (before breakfast) Take 30-60 minutes before eating. 90 capsule 3     Flaxseed Oil (LINSEED OIL) OIL        Flaxseed, Linseed, POWD Take 1-2 tsp by mouth daily.       fluocinonide (LIDEX) 0.05 % ointment Apply topically 2 times daily       fluticasone (FLONASE) 50 MCG/ACT nasal spray Spray 1 spray into both nostrils daily 1 Bottle 3     gabapentin (NEURONTIN) 100 MG capsule Take 1-2 capsules (100-200 mg) by mouth At Bedtime 60 capsule 0     guaiFENesin-codeine (ROBITUSSIN AC) 100-10 MG/5ML solution Take 5 mLs by mouth every 6 hours as needed for cough 118 mL 0      hydrochlorothiazide (HYDRODIURIL) 25 MG tablet Take 1 tablet (25 mg) by mouth daily 35 tablet 1     ibuprofen (ADVIL/MOTRIN) 200 MG capsule Take 200 mg by mouth every 4 hours as needed for fever       metoprolol succinate ER (TOPROL-XL) 25 MG 24 hr tablet Take 1 tablet (25 mg) by mouth daily 90 tablet 3     mometasone furoate (ASMANEX HFA) 200 MCG/ACT inhaler Inhale 2 puffs into the lungs 2 times daily 3 Inhaler 3     montelukast (SINGULAIR) 10 MG tablet Take 1 tablet (10 mg) by mouth At Bedtime 90 tablet 3     Omega-3 Fatty Acids ([employer] NATURAL FISH OIL) 1000 MG CAPS Take 1,400 mg by mouth daily       Probiotic Product (PROBIOTIC PO)        sertraline (ZOLOFT) 100 MG tablet   0     sertraline (ZOLOFT) 50 MG tablet Take 1.5 tablets (75 mg) by mouth daily 135 tablet 0     traZODone (DESYREL) 100 MG tablet Take 1 tablet (100 mg) by mouth At Bedtime 90 tablet 0     triamcinolone (KENALOG) 0.5 % OINT ointment Apply to affected area twice a day, when rash is active 30 g 0     TURMERIC PO        Allergies   Allergen Reactions     Penicillins Rash     Shellfish-Derived Products Itching and Rash     Sulfacetamide Rash     /84 (BP Location: Right arm, Patient Position: Sitting, Cuff Size: Adult Regular)   Pulse (!) 47   Temp 97.8  F (36.6  C) (Oral)   Wt 58.9 kg (129 lb 12.8 oz)   SpO2 99%   BMI 26.70 kg/m    164/75, HR 48 repeat  She did not take her BP medication (s) today    BP Readings from Last 6 Encounters:   06/26/19 140/72   05/30/19 125/52   05/14/19 155/79   05/07/19 168/76   04/16/19 138/71   03/13/19 149/80     Alena was seen today for hypertension.    Diagnoses and all orders for this visit:    Benign essential hypertension, see HPI.  Language barrier.  Medication compliance and rx history unclear.  Need objective evidence of blood pressures while taking blood pressure medication. It appears that she is already being followed at [facility] cardiology for this.  Also seeing multiple other providers.  -     24  Hour Blood Pressure Monitor - Adult; Future  -     See me within one month after monitor turned in.  -     All future visits with     Total time spent 25 minutes.  More than 50% of the time spent with Ms. Coronel on counseling / coordinating her care      Ling Luna M.D.  Internal Medicine  Primary Care Center   pager [phone]

## 2019-07-03 ENCOUNTER — OFFICE VISIT (OUTPATIENT)
Dept: INTERNAL MEDICINE | Facility: CLINIC | Age: 79
End: 2019-07-03
Payer: COMMERCIAL

## 2019-07-03 ENCOUNTER — HOSPITAL ENCOUNTER (OUTPATIENT)
Dept: CARDIOLOGY | Facility: CLINIC | Age: 79
Discharge: HOME | End: 2019-07-03
Attending: INTERNAL MEDICINE | Admitting: INTERNAL MEDICINE
Payer: COMMERCIAL

## 2019-07-03 VITALS
SYSTOLIC BLOOD PRESSURE: 164 MMHG | DIASTOLIC BLOOD PRESSURE: 75 MMHG | WEIGHT: 129.8 LBS | OXYGEN SATURATION: 99 % | TEMPERATURE: 97.8 F | HEART RATE: 48 BPM | BODY MASS INDEX: 26.7 KG/M2

## 2019-07-03 DIAGNOSIS — R03.0 ELEVATED BLOOD PRESSURE READING WITHOUT DIAGNOSIS OF HYPERTENSION: ICD-10-CM

## 2019-07-03 DIAGNOSIS — I10 BENIGN ESSENTIAL HYPERTENSION: Primary | ICD-10-CM

## 2019-07-03 DIAGNOSIS — I10 BENIGN ESSENTIAL HYPERTENSION: ICD-10-CM

## 2019-07-03 PROCEDURE — 93788 AMBL BP MNTR W/SW A/R: CPT

## 2019-07-03 PROCEDURE — 93790 AMBL BP MNTR W/SW I&R: CPT | Performed by: INTERNAL MEDICINE

## 2019-07-03 ASSESSMENT — PAIN SCALES - GENERAL: PAINLEVEL: MILD PAIN (2)

## 2019-07-03 NOTE — PATIENT INSTRUCTIONS
[facility] Medication Refill Request Information:  * Please contact your pharmacy regarding ANY request for medication refills.  ** [facility] Prescription Fax = [phone]  * Please allow 3 business days for routine medication refills.  * Please allow 5 business days for controlled substance medication refills.     [facility] Test Result notification information:  *You will be notified with in 7-10 days of your appointment day regarding the results of your test.  If you are on MyChart you will be notified as soon as the provider has reviewed the results and signed off on them.    [facility]: [phone]

## 2019-07-03 NOTE — NURSING NOTE
Chief Complaint   Patient presents with     Hypertension     pt here for blood pressure       Mayra Sinha CMA at 7:32 AM on 7/3/2019.

## 2019-07-04 ENCOUNTER — MEDICAL CORRESPONDENCE (OUTPATIENT)
Dept: HEALTH INFORMATION MANAGEMENT | Facility: CLINIC | Age: 79
End: 2019-07-04

## 2019-07-07 RX ORDER — HYDROCHLOROTHIAZIDE 25 MG/1
25 TABLET ORAL DAILY
Qty: 90 TABLET | Refills: 0 | Status: SHIPPED | OUTPATIENT
Start: 2019-07-07 | End: 2019-07-08

## 2019-07-07 NOTE — TELEPHONE ENCOUNTER
hydrochlorothiazide (HYDRODIURIL) 25 MG tablet  Last Written Prescription Date:  5/7/19  Last Fill Quantity: 35,   # refills: 1  Last Office Visit :7/3/19  Future Office visit: 8/9/19    90 day to pharmacy       Routing refill request to provider for review/approval because: bp > 140/90

## 2019-07-08 DIAGNOSIS — I10 ESSENTIAL HYPERTENSION: ICD-10-CM

## 2019-07-08 RX ORDER — HYDROCHLOROTHIAZIDE 25 MG/1
25 TABLET ORAL DAILY
Qty: 90 TABLET | Refills: 0 | Status: SHIPPED | OUTPATIENT
Start: 2019-07-08 | End: 2020-01-23

## 2019-07-18 ENCOUNTER — TELEPHONE (OUTPATIENT)
Dept: INTERNAL MEDICINE | Facility: CLINIC | Age: 79
End: 2019-07-18

## 2019-07-18 ENCOUNTER — TELEPHONE (OUTPATIENT)
Dept: FAMILY MEDICINE | Facility: CLINIC | Age: 79
End: 2019-07-18

## 2019-07-18 NOTE — TELEPHONE ENCOUNTER
Health Call Center    Phone Message    May a detailed message be left on voicemail: yes    Reason for Call: Medication Question or concern regarding medication   Prescription Clarification  Name of Medication: hydrochlorothiazide (HYDRODIURIL) 25 MG tablet & metoprolol succinate ER (TOPROL-XL) 25 MG 24 hr tablet  Prescribing Provider: Dr. Luna    Pharmacy:    What on the order needs clarification? Pt went and saw her acupuncturist today and he told her that her medications are not stable. Pt would like to speak to a nurse or the doctor about her medications.             Action Taken: Message routed to:  [facility] ([facility] P[facility]

## 2019-07-18 NOTE — TELEPHONE ENCOUNTER
Spoke with patient she did not think the medications worked- she will schedule a follow up visit for this.   Gave patient lab results from 6/26/19 labs, patient would like these mailed to her home address- verified correct address.   Yani Noel RN

## 2019-07-18 NOTE — TELEPHONE ENCOUNTER
Reason for call:  Results   Name of test or procedure: Blood test   Date of test or procedure: 2019  Location of test or procedure: [place]    Additional comments: Patient wanting lab results. Patient was also was prescribed cough medicine azithromycin (ZITHROMAX) 250 MG tablet ()guaiFENesin-codeine (ROBITUSSIN AC) 100-10 MG/5ML solution and the medication is not helping. Please call back and advise     Phone number to reach patient:  Home number on file [phone] (home)    Best Time:  Any    Can we leave a detailed message on this number?  YES

## 2019-07-19 NOTE — TELEPHONE ENCOUNTER
Went to the accupuncturist and they said her pulse was not good,  /71 HR too slow 40,  Just now on the imckp103/67 pulse 53   Dizzy, tired. Couldn't sleep during the night cough.Sinus was told by acupuncture. Assisted with an appointment for next week. Anahi Benson Paramedic on 7/19/2019 at 8:37 AM

## 2019-07-23 ENCOUNTER — OFFICE VISIT (OUTPATIENT)
Dept: INTERNAL MEDICINE | Facility: CLINIC | Age: 79
End: 2019-07-23
Payer: COMMERCIAL

## 2019-07-23 VITALS
DIASTOLIC BLOOD PRESSURE: 73 MMHG | WEIGHT: 129.3 LBS | BODY MASS INDEX: 26.6 KG/M2 | TEMPERATURE: 98 F | OXYGEN SATURATION: 100 % | SYSTOLIC BLOOD PRESSURE: 158 MMHG | HEART RATE: 44 BPM

## 2019-07-23 DIAGNOSIS — R35.0 URINARY FREQUENCY: ICD-10-CM

## 2019-07-23 DIAGNOSIS — R35.0 URINARY FREQUENCY: Primary | ICD-10-CM

## 2019-07-23 LAB
ALBUMIN UR-MCNC: NEGATIVE MG/DL
APPEARANCE UR: CLEAR
BILIRUB UR QL STRIP: NEGATIVE
COLOR UR AUTO: ABNORMAL
GLUCOSE UR STRIP-MCNC: NEGATIVE MG/DL
HGB UR QL STRIP: NEGATIVE
KETONES UR STRIP-MCNC: NEGATIVE MG/DL
LEUKOCYTE ESTERASE UR QL STRIP: NEGATIVE
MUCOUS THREADS #/AREA URNS LPF: PRESENT /LPF
NITRATE UR QL: NEGATIVE
PH UR STRIP: 8 PH (ref 5–7)
RBC #/AREA URNS AUTO: 0 /HPF (ref 0–2)
SOURCE: ABNORMAL
SP GR UR STRIP: 1 (ref 1–1.03)
UROBILINOGEN UR STRIP-MCNC: 0 MG/DL (ref 0–2)
WBC #/AREA URNS AUTO: 0 /HPF (ref 0–5)

## 2019-07-23 ASSESSMENT — PAIN SCALES - GENERAL: PAINLEVEL: SEVERE PAIN (6)

## 2019-07-23 NOTE — NURSING NOTE
Chief Complaint   Patient presents with     Hypertension     Cough     For past 2 months. Worse at night

## 2019-07-23 NOTE — PROGRESS NOTES
PRIMARY CARE CENTER      SUBJECTIVE:     Alena Coronel is a 78 year old female with a PMHx of HTN, bradycardia, GERD, HLD, and osteoporosis who comes in for follow-up of hypertension and cough. PCP is Dr. Luna.    Hypertension  Recently underwent 24 hour ambulatory blood pressure monitoring with average 142/66 for the duration, indicating stage II hypertension with HRs in 40s-60s while on metoprolol and hydrochlorothiazide. Denies chest pain/pressure/tightness, palpitations, shortness of breath. Endorses postural and AM lightheadedness without  presyncope.     Reportedly diagnosed with hypertension 2-3 years prior with medication managed through multiple providers within and outside of [facility]. She was most recently seen by Dr. Luna 7/3 at which time she was evaluated with ambulatory blood pressure monitoring as home blood pressure was difficult to determine. There were concerns for medication adherence at the time as well with confusion compounded by language barrier. She is accompanied by an  today.     Cough  Endorses productive cough and night sweats. Has previously been evaluated for multiple etiologies. Predominantly positional and nocturnal. Some itchy throat. No improvements in cough since beginning esomeprazole which she takes inconsistently. No improvements with Singulair. Somewhat improved with medications provided by an acupuncturist.         MEDICATIONS/ALLERGIES:     Medications and allergies reviewed by me today.      ROS:     Constitutional, neuro, ENT, endocrine, pulmonary, cardiac, gastrointestinal, genitourinary, musculoskeletal, integument and psychiatric systems are negative, except as otherwise noted.     OBJECTIVE:     /73   Pulse (!) 44   Temp 98  F (36.7  C) (Oral)   Wt 58.7 kg (129 lb 4.8 oz)   SpO2 100%   BMI 26.60 kg/m     Wt Readings from Last 1 Encounters:   07/23/19 58.7 kg (129 lb 4.8 oz)       GENERAL APPEARANCE:  healthy, alert and no distress     EYES: EOMI, PERRL     HENT: NCAT     NECK: no adenopathy, no asymmetry, masses, or scars and thyroid normal to palpation     RESP: lungs clear to auscultation - no rales, rhonchi or wheezes     CV: regular rates and rhythm, normal S1 S2, no S3 or S4 and no murmur, click or rub     ABDOMEN:  soft, nontender, no HSM or masses and bowel sounds normal     MS: extremities normal- no gross deformities noted, no evidence of inflammation in joints, FROM in all extremities.     SKIN: no suspicious lesions or rashes     NEURO: grossly normal without focal deficit, mentation intact and speech normal     PSYCH: mentation appears normal. and affect normal/bright     LYMPHATICS: No cervical adenopathy      ASSESSMENT/PLAN:     Alena was seen today for hypertension and cough.    Diagnoses and all orders for this visit:    Urinary frequency  -     UA with Micro reflex to Culture; Future    Hypertension  Bradycardia  Suspect postural lightheadedness with inadequate HR response while on metoprolol. Will discontinue metoprolol and continue hydrochlorothiazide. Patient to RTC with blood pressure log. Discussed potential for increased BP with discontinuation of metoprolol. May consider addition of lisinopril on RTC if blood pressure remains above goal.  - Discontinue metoprolol  - Continue hydrochlorothiazide  - RTC with blood pressure log in upcoming weeks  - May consider addition of lisinopril if blood pressure remains above goal    GERD  Cough  Suspect GERD-associated cough.  - Esomeprazole daily in AM    Pt should return to clinic for f/u with Dr. Luna as scheduled.     Options for treatment and follow-up care were reviewed with the patient. Alena Coronel engaged in the decision making process and verbalized understanding of the options discussed and agreed with the final plan.    Liya Wolfe MD  PGY-1, Internal Medicine  Jul 23, 2019    Pt was seen and plan of care  discussed with Dr. Branch.   Ms Coronel was seen and examined with the resident Dr Yaneth khalil reviewed her note and plan and I agree.  Haja Branch MD

## 2019-07-23 NOTE — PATIENT INSTRUCTIONS
Stop taking metoprolol. Take your blood pressure 2-3 times per week and bring your blood pressure recording with you to your next visit.  Continue taking hydrochlorothiazide.  Start taking your esomeprazole (nexium) every morning.  Follow up in 1 month to discuss your blood pressure and cough.           [facility]         Internal Medicine Resident                   Continuity Clinic    Who We Are    Resident Continuity Clinic is a part of the [facility] Primary [facility].  Resident physicians see patients independently and establish a relationship with them over the course of their three-year residency program.  As with the Primary Care Clinic, our Resident Continuity Clinic models a group practice.  If your doctor is not available, you will be able to see another resident physician.  At the end of a resident s training, patients will be transitioned to a new resident physician for ongoing care.     We treat patients with a wide array of medical needs from routine physicals, to acute illnesses, to diabetes and blood pressure management, to complex medical illness.  What is a Resident Physician?    Resident physicians hold medical degrees and are doctors. They are training to become specialists in Internal Medicine. They work under the supervision of board-certified faculty physicians.  Expectations for Your Care    We strive to provide accessible, quality care at all times.    In order to provide this care, it is best to see your primary care resident doctor consistently rather switching between providers.  In the event you do see another physician, you should schedule a follow-up visit with your usual primary care doctor.    If you are transitioning your care from another clinic, it is helpful to have your records available for your doctor to review.    We do not prescribe controlled substances, such as ADD medications or narcotic pain medications, on your first visit.  We will review your health  records and concerns prior to devising a treatment plan with you in order to provide the best care.      Clinic Services     Extended clinic hours; patient  to help navigate your visit;  parking; laboratory and imaging services with evening and weekend hours    Multiple medical and surgical specialties in one building    Complementary services, including Nutrition, Integrative Medicine, Pharmacy consultations, Mental and Behavioral Health, Sports Medicine and Physical Therapy    Thank You    We would like to thank you for choosing the [facility] Resident Continuity Clinic for your primary care. You are making a priceless contribution to the training of the next generation of health care practitioners.     Contact us at [phone] for appointments or questions.    Resident Clinic Hours are Tuesdays and Thursdays, 7:30am-5:00pm    Residents   Mack Rahman MD  (Male)   Brodie Plummer MD   (Male)   Liya Wolfe MD  (Female)  Milana Harper MD   (Female)   Sydnee Truong MD   (Female)    Zuleika Ibarra MD    (Female)   Diogenes Gresham MD  (Male)   Yasmany Randolph MD  (Male)    Joelle Jaramillo MD  (Female)   Kindra Gaytan MD  (Female)   Jenny Henry MD    (Female)   Joe Ansari MD  (Male)   Puma Mckenzie MD  (Male)   Conner Pérez MD  (Male)   GENESIS Rodriguez MD   (Male)   Arvin Calzada MD  (Male)    Alis Kim MD (Female)   Fran Stark MD  (Male)   Jerrod Lopez MD  (Male)   Marline Sr MD  (Male)   Viki Carson MD    (Female)   Germania Drew MD  (Female)     Supervising Physicians   MD Leon Espinosa MD Jill Bowman Peterson, MD Briar Duffy, MD James Langland, MD Mary Logeais, MD Tanya Melnik, MD Charles Moldow, MD Heather Thompson Buum, MD        Follow up with Dr Luna as scheduled.    Please go to the lab on the first floor before you leave today.

## 2019-08-09 ENCOUNTER — ANCILLARY PROCEDURE (OUTPATIENT)
Dept: CT IMAGING | Facility: CLINIC | Age: 79
End: 2019-08-09
Attending: INTERNAL MEDICINE
Payer: COMMERCIAL

## 2019-08-09 ENCOUNTER — OFFICE VISIT (OUTPATIENT)
Dept: INTERNAL MEDICINE | Facility: CLINIC | Age: 79
End: 2019-08-09
Payer: COMMERCIAL

## 2019-08-09 VITALS
WEIGHT: 127.3 LBS | RESPIRATION RATE: 16 BRPM | HEART RATE: 61 BPM | DIASTOLIC BLOOD PRESSURE: 72 MMHG | BODY MASS INDEX: 26.18 KG/M2 | SYSTOLIC BLOOD PRESSURE: 185 MMHG

## 2019-08-09 DIAGNOSIS — I10 BENIGN ESSENTIAL HYPERTENSION: Primary | ICD-10-CM

## 2019-08-09 DIAGNOSIS — R73.03 PREDIABETES: ICD-10-CM

## 2019-08-09 DIAGNOSIS — I10 BENIGN ESSENTIAL HYPERTENSION: ICD-10-CM

## 2019-08-09 DIAGNOSIS — R05.3 CHRONIC COUGH: Primary | ICD-10-CM

## 2019-08-09 DIAGNOSIS — R91.8 ABNORMAL CT SCAN OF LUNG: ICD-10-CM

## 2019-08-09 DIAGNOSIS — R05.3 CHRONIC COUGH: ICD-10-CM

## 2019-08-09 LAB
ANION GAP SERPL CALCULATED.3IONS-SCNC: 6 MMOL/L (ref 3–14)
BUN SERPL-MCNC: 17 MG/DL (ref 7–30)
CALCIUM SERPL-MCNC: 8.8 MG/DL (ref 8.5–10.1)
CHLORIDE SERPL-SCNC: 101 MMOL/L (ref 94–109)
CO2 SERPL-SCNC: 29 MMOL/L (ref 20–32)
CREAT SERPL-MCNC: 0.84 MG/DL (ref 0.52–1.04)
GFR SERPL CREATININE-BSD FRML MDRD: 67 ML/MIN/{1.73_M2}
GLUCOSE SERPL-MCNC: 90 MG/DL (ref 70–99)
HBA1C MFR BLD: 5.6 % (ref 0–5.6)
POTASSIUM SERPL-SCNC: 3.7 MMOL/L (ref 3.4–5.3)
SODIUM SERPL-SCNC: 136 MMOL/L (ref 133–144)

## 2019-08-09 RX ORDER — LOSARTAN POTASSIUM 50 MG/1
50 TABLET ORAL DAILY
Qty: 30 TABLET | Refills: 1 | Status: SHIPPED | OUTPATIENT
Start: 2019-08-09 | End: 2019-09-19

## 2019-08-09 ASSESSMENT — PAIN SCALES - GENERAL: PAINLEVEL: MODERATE PAIN (5)

## 2019-08-09 NOTE — PROGRESS NOTES
CC:  F/u several issues    HPI:    Recently underwent 24 hour ambulatory blood pressure monitoring with average 142/66 for the duration, indicating stage II hypertension with HRs in 40s-60s while on metoprolol and hydrochlorothiazide. Metoprolol d/c'd per IM resident (note reviewed)  Postural lightheadedness improved  Home blood pressures home as they are in the clinic    Productive cough Has previously been evaluated for multiple etiologies. Predominantly positional and nocturnal. Some itchy throat.No improvements with Singulair. Somewhat improved with medications provided by an acupuncturist.  Suspected GERD-associated cough and was started on esomeprazole daily in AM  She stopped due to nausea and headache (took daily for one week)  It did not help  No known exposures to sick contacts  6 years ago went [place]  No domestic travels  Malaria illness with GI bleeding, but she was not exposed  BCG when young  Chronic fatigue, reviewed labs  Prediabetes, due for A1C      Component      Latest Ref Rng & Units 11/28/2018   Hemoglobin A1C      0 - 5.6 % 5.7 (H)     Component      Latest Ref Rng & Units 5/7/2019   WBC      4.0 - 11.0 10e9/L 6.2   RBC Count      3.8 - 5.2 10e12/L 4.80   Hemoglobin      11.7 - 15.7 g/dL 13.3   Hematocrit      35.0 - 47.0 % 40.6   MCV      78 - 100 fl 85   MCH      26.5 - 33.0 pg 27.7   MCHC      31.5 - 36.5 g/dL 32.8   RDW      10.0 - 15.0 % 14.8   Platelet Count      150 - 450 10e9/L 211     Component      Latest Ref Rng & Units 6/26/2019   Sodium      133 - 144 mmol/L 133   Potassium      3.4 - 5.3 mmol/L 3.4   Chloride      94 - 109 mmol/L 98   Carbon Dioxide      20 - 32 mmol/L 27   Anion Gap      3 - 14 mmol/L 8   Glucose      70 - 99 mg/dL 90   Urea Nitrogen      7 - 30 mg/dL 11   Creatinine      0.52 - 1.04 mg/dL 0.79   GFR Estimate      >60 mL/min/1.73:m2 73   GFR Estimate If Black      >60 mL/min/1.73:m2 84   Calcium      8.5 - 10.1 mg/dL 8.7     Component      Latest Ref Rng & Units  6/26/2019   Albumin Urine mg/L      mg/L <5     Component      Latest Ref Rng & Units 7/23/2019   Color Urine       Straw   Appearance Urine       Clear   Glucose Urine      NEG:Negative mg/dL Negative   Bilirubin Urine      NEG:Negative Negative   Ketones Urine      NEG:Negative mg/dL Negative   Specific Gravity Urine      1.003 - 1.035 1.003   Blood Urine      NEG:Negative Negative   pH Urine      5.0 - 7.0 pH 8.0 (H)   Protein Albumin Urine      NEG:Negative mg/dL Negative   Urobilinogen mg/dL      0.0 - 2.0 mg/dL 0.0   Nitrite Urine      NEG:Negative Negative   Leukocyte Esterase Urine      NEG:Negative Negative   Source       Midstream Urine   WBC Urine      0 - 5 /HPF 0   RBC Urine      0 - 2 /HPF 0   Mucous Urine      NEG:Negative /LPF Present (A)   XR CHEST PORT 1 VW  12/11/2018 12:34 AM     History:  HTN; HTN.      Comparison: Chest radiograph dated 2/17/2004     Findings:   Portable upright AP chest radiograph.  Cardiac silhouette is slightly enlarged. Pulmonary vasculature is  mildly indistinct. No acute airspace opacities. No pneumothorax or  pleural effusion. The visualized upper abdomen is unremarkable.                                                                      IMPRESSION:  Mild cardiomegaly. Clear lungs.    10/13/08  2:44 PM 240384    Component   XCELERA      Interpretation Summary  No stress induced regional wall motion abnormalities.  Normal resting LV function with EF of approximately 60-65%; normal response   to exercise with increase to approximately 70-75%.  No ECG evidence of ischemia.  No subjective evidence of ischemia.  No significant valvular disease noted on routine screening color flow Doppler   and pulsed Doppler examination.         Patient Active Problem List   Diagnosis     Generalized osteoarthrosis, unspecified site     Irritable bowel syndrome     Adjustment disorder with mixed anxiety and depressed mood     Insomnia     Eczema     Hypertension goal BP (blood pressure) <  140/90     Hyperlipidemia LDL goal <130     Chronic neck pain     Hypokalemia     Osteoporosis     Chest pain     Idiopathic thrombocytopenia (H)     RUQ abdominal pain     Immune to hepatitis A     Dysphagia     GERD (gastroesophageal reflux disease)     Advanced directives, counseling/discussion     Macular drusen     PVD (posterior vitreous detachment)     Venous stasis     Anxiety     Cataract     TMJ (temporomandibular joint syndrome)     Urinary incontinence     Chronic cough     Digestive problems     Lumbago     Low back pain with bilateral sciatica     Palpitations     Chronic pruritus     Disorder of bone and cartilage     Current Outpatient Medications   Medication Sig Dispense Refill     Acetaminophen (TYLENOL PO) Take 325 mg by mouth every 4 hours as needed for mild pain or fever       albuterol (PROAIR RESPICLICK) 108 (90 Base) MCG/ACT inhaler Inhale 2 puffs into the lungs every 6 hours as needed for shortness of breath / dyspnea or wheezing 1 Inhaler 1     Albuterol (VENTOLIN IN) 2-4 puffs every 4-6 hours as needed.Shannon Dao LPN 3:39 PM on 10/29/2018       alendronate (FOSAMAX) 70 MG tablet Take 1 tablet (70 mg) by mouth every 7 days 12 tablet 3     atorvastatin (LIPITOR) 40 MG tablet Take 1 tablet (40 mg) by mouth daily 90 tablet 3     Biotin 2500 MCG CAPS Take 5,000 mcg/day by mouth       Blood Pressure Monitor KIT 1 Device daily 1 kit 1     Calcium Carbonate-Vit D-Min (CALCIUM 1200 PO) Take 1,000 Units by mouth 1 tab daily       Cholecalciferol (VITAMIN D) 1000 UNITS capsule Take 5,000 Units by mouth daily        Coenzyme Q10 (COQ10) 200 MG CAPS Take 200 mg by mouth daily       CVS OMEGA-3 KRILL  MG CAPS Take  by mouth.       esomeprazole (NEXIUM) 40 MG DR capsule Take 1 capsule (40 mg) by mouth every morning (before breakfast) Take 30-60 minutes before eating. 90 capsule 3     Flaxseed Oil (LINSEED OIL) OIL        Flaxseed, Linseed, POWD Take 1-2 tsp by mouth daily.       fluocinonide  (LIDEX) 0.05 % ointment Apply topically 2 times daily       fluticasone (FLONASE) 50 MCG/ACT nasal spray Spray 1 spray into both nostrils daily 1 Bottle 3     gabapentin (NEURONTIN) 100 MG capsule Take 1-2 capsules (100-200 mg) by mouth At Bedtime 60 capsule 0     guaiFENesin-codeine (ROBITUSSIN AC) 100-10 MG/5ML solution Take 5 mLs by mouth every 6 hours as needed for cough 118 mL 0     hydrochlorothiazide (HYDRODIURIL) 25 MG tablet Take 1 tablet (25 mg) by mouth daily 90 tablet 0     ibuprofen (ADVIL/MOTRIN) 200 MG capsule Take 200 mg by mouth every 4 hours as needed for fever       mometasone furoate (ASMANEX HFA) 200 MCG/ACT inhaler Inhale 2 puffs into the lungs 2 times daily 3 Inhaler 3     montelukast (SINGULAIR) 10 MG tablet Take 1 tablet (10 mg) by mouth At Bedtime 90 tablet 3     Omega-3 Fatty Acids ([employer]S NATURAL FISH OIL) 1000 MG CAPS Take 1,400 mg by mouth daily       Probiotic Product (PROBIOTIC PO)        sertraline (ZOLOFT) 100 MG tablet   0     sertraline (ZOLOFT) 50 MG tablet Take 1.5 tablets (75 mg) by mouth daily 135 tablet 0     traZODone (DESYREL) 100 MG tablet Take 1 tablet (100 mg) by mouth At Bedtime 90 tablet 0     triamcinolone (KENALOG) 0.5 % OINT ointment Apply to affected area twice a day, when rash is active 30 g 0     TURMERIC PO        metoprolol succinate ER (TOPROL-XL) 25 MG 24 hr tablet Take 1 tablet (25 mg) by mouth daily (Patient not taking: Reported on 8/9/2019) 90 tablet 3     Allergies   Allergen Reactions     Penicillins Rash     Shellfish-Derived Products Itching and Rash     Sulfacetamide Rash     BP (!) 185/72 (BP Location: Right arm, Patient Position: Sitting, Cuff Size: Adult Regular)   Pulse 61   Resp 16   Wt 57.7 kg (127 lb 4.8 oz)   Breastfeeding? No   BMI 26.18 kg/m    BP Readings from Last 6 Encounters:   08/09/19 (!) 185/72   07/23/19 158/73   07/03/19 164/75   06/26/19 140/72   05/30/19 125/52   05/14/19 155/79   Gen:  NAD, some difficulty with english  language, here with unofficial     Alena was seen today for fatigue, cough, back pain and results.    Diagnoses and all orders for this visit:    Benign essential hypertension  -     Add losartan (COZAAR) 50 MG tablet; Take 1 tablet (50 mg) by mouth daily  -     Basic metabolic panel; Future in one month    Chronic cough  -     CT Chest w/o contrast; Future    Prediabetes  -     Hemoglobin A1c; Future     Total time spent 40 minutes.  More than 50% of the time spent with Ms. Coronel on counseling / coordinating her care      F/U one month    Ling Luna M.D.  Internal Medicine  [facility]   pager [phone]

## 2019-08-09 NOTE — NURSING NOTE
Chief Complaint   Patient presents with     Fatigue     Cough     Back Pain     Results     BP monitor results; metoprolol was stopped by a provider.  Also prediabetes and thyroid results       Osiel Coker CMA ([facility]) at 9:12 AM on 8/9/2019

## 2019-08-09 NOTE — PATIENT INSTRUCTIONS
[facility] Medication Refill Request Information:  * Please contact your pharmacy regarding ANY request for medication refills.  ** [facility] Prescription Fax = [phone]  * Please allow 3 business days for routine medication refills.  * Please allow 5 business days for controlled substance medication refills.     [facility] Test Result notification information:  *You will be notified with in 7-10 days of your appointment day regarding the results of your test.  If you are on MyChart you will be notified as soon as the provider has reviewed the results and signed off on them.    [facility] [phone]

## 2019-08-13 DIAGNOSIS — R06.02 SOB (SHORTNESS OF BREATH): Primary | ICD-10-CM

## 2019-08-14 ENCOUNTER — OFFICE VISIT (OUTPATIENT)
Dept: PSYCHIATRY | Facility: CLINIC | Age: 79
End: 2019-08-14
Attending: NURSE PRACTITIONER
Payer: COMMERCIAL

## 2019-08-14 VITALS
WEIGHT: 128.4 LBS | SYSTOLIC BLOOD PRESSURE: 132 MMHG | BODY MASS INDEX: 26.41 KG/M2 | HEART RATE: 52 BPM | DIASTOLIC BLOOD PRESSURE: 75 MMHG

## 2019-08-14 DIAGNOSIS — F41.9 ANXIETY: Primary | ICD-10-CM

## 2019-08-14 PROCEDURE — G0463 HOSPITAL OUTPT CLINIC VISIT: HCPCS | Mod: ZF

## 2019-08-14 ASSESSMENT — PAIN SCALES - GENERAL: PAINLEVEL: MODERATE PAIN (4)
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
## 2019-08-14 NOTE — PATIENT INSTRUCTIONS
Thank you for coming to the PSYCHIATRY CLINIC.    Lab Testing:  If you had lab testing today and your results are reassuring or normal they will be mailed to you or sent through [facility] within 7 days.   If the lab tests need quick action we will call you with the results.  The phone number we will call with results is # [phone] (home) . If this is not the best number please call our clinic and change the number.    Medication Refills:  If you need any refills please call your pharmacy and they will contact us. Our fax number for refills is [phone]. Please allow three business for refill processing.   If you need to  your refill at a new pharmacy, please contact the new pharmacy directly. The new pharmacy will help you get your medications transferred.     Scheduling:  If you have any concerns about today's visit or wish to schedule another appointment please call our office during normal business hours [phone] (8-5:00 M-F)    Contact Us:  Please call [phone] during business hours (8-5:00 M-F).  If after clinic hours, or on the weekend, please call  [phone].    Financial Assistance [phone]  [facility] Billing [phone]  [place] Billing Office, [facility]: [phone]  [facility] Billing [phone]  Medical Records [phone]      MENTAL HEALTH CRISIS NUMBERS:  [facility]:   [facility] - [phone]   Crisis Residence [facility] - [phone]   Walk-In Counseling [facility] [phone]   COPE 24/7 [place] Mobile Team for Adults - [[phone]]; Child - [[phone]]        [facility]:   [facility] - [phone]   Walk-in counseling [facility] - [phone]   Walk-in counseling [facility] - [phone]   Crisis Residence [facility] - [phone]   Urgent Care Adult Mental Health:   --Drop-in, 24/7 crisis line, and [facility] Mobile Team  [[phone]]    CRISIS TEXT LINE: Text [phone] from anywhere, anytime, any crisis 24/7;    OR SEE www.crisistextline.org     Poison Control Center - [phone]    CHILD: [facility] needs assessment team - [phone]     [facility] - [phone]; or [facility] Lifeline - [phone]    If you have a medical emergency please call 911or go to the nearest ER.                    _____________________________________________    Again thank you for choosing PSYCHIATRY CLINIC and please let us know how we can best partner with you to improve you and your family's health.  You may be receiving a survey in the mail regarding this appointment. We would love to have your feedback, both positive and negative, so please fill out the survey and return it using the provided envelope. The survey is done by an external company, so your answers are anonymous.

## 2019-08-15 NOTE — TELEPHONE ENCOUNTER
RECORDS RECEIVED FROM: Internal   DATE RECEIVED: 11.6.19   NOTES STATUS DETAILS   OFFICE NOTE from referring provider Internal 8.9.19 Dr. Luna   OFFICE NOTE from other specialist Internal 6.29.19 Dr. De La Torre  5.30.19 Dr. ríos  5.18.18 2.23.18 1.17.18   DISCHARGE SUMMARY from hospital N/A    DISCHARGE REPORT from the ER N/A    MEDICATION LIST Internal    IMAGING  (NEED IMAGES AND REPORTS)     CT SCAN Internal 8.9.19   CHEST XRAY (CXR) Internal/in process 12.11.18   TESTS     PULMONARY FUNCTION TESTING (PFT) In process Scheduled      Action    Action Taken Sent message to scheduling and clinical pool regarding placing cxr order.

## 2019-08-23 DIAGNOSIS — F41.9 ANXIETY: ICD-10-CM

## 2019-08-26 ENCOUNTER — TELEPHONE (OUTPATIENT)
Dept: INTERNAL MEDICINE | Facility: CLINIC | Age: 79
End: 2019-08-26

## 2019-08-26 RX ORDER — TRAZODONE HYDROCHLORIDE 100 MG/1
100 TABLET ORAL AT BEDTIME
Qty: 30 TABLET | Refills: 0 | Status: SHIPPED | OUTPATIENT
Start: 2019-08-26 | End: 2019-08-27

## 2019-08-26 NOTE — TELEPHONE ENCOUNTER
Medication requested:traZODone (DESYREL) 100 MG   Last refilled: 5/14/19  Qty: 90  * Continue Trazodone 100mg at bedtime     Last seen: 5/14/19  RTC:  3 MOS  Cancel:  0  No-show: 0  Next appt: NONE  Refill decision: 30 day jer refill sent to the pharmacy - including instructions for patient to call the clinic and schedule an appointment.   Scheduling has been notified to contact the pt for appointment.

## 2019-08-26 NOTE — TELEPHONE ENCOUNTER
Cozaar has refill available - unable to fill until tomorrow per pahrmacy. Trazodone refilled in a different encounter.    Patient was called with this information.      Kathleen M Doege RN

## 2019-08-27 DIAGNOSIS — F41.9 ANXIETY: ICD-10-CM

## 2019-08-27 RX ORDER — TRAZODONE HYDROCHLORIDE 100 MG/1
100 TABLET ORAL AT BEDTIME
Qty: 90 TABLET | Refills: 0 | Status: SHIPPED | OUTPATIENT
Start: 2019-08-27 | End: 2019-12-03

## 2019-08-27 NOTE — TELEPHONE ENCOUNTER
Health Call Center    Phone Message    May a detailed message be left on voicemail: yes    Reason for Call: Medication Refill Request    Has the patient contacted the pharmacy for the refill? Yes   Name of medication being requested: trazodone 100mg  Provider who prescribed the medication: YAZAN Downey CNP  Pharmacy: [id number] [address] [facility]  Date medication is needed: ASAP      Patient was given 30 tablets but says she needs 90 tablets. She declined scheduling an appointment until she could talk to Riky about this.      Action Taken: Message routed to:  Other: Nursing pool

## 2019-08-27 NOTE — TELEPHONE ENCOUNTER
Riky Hess APRN CNP Labossiere, Laura, RN   Caller: Unspecified (Today, 12:24 PM)             90 days is fine.  May have been a mistake on my part.  She is doing quite well          90 d/s sent to pt's pharmacy with request to discontinue previous Rx for 30 d/s.

## 2019-08-28 ENCOUNTER — TELEPHONE (OUTPATIENT)
Dept: PSYCHIATRY | Facility: CLINIC | Age: 79
End: 2019-08-28

## 2019-08-29 DIAGNOSIS — R05.9 COUGH: Primary | ICD-10-CM

## 2019-09-03 ENCOUNTER — TRANSFERRED RECORDS (OUTPATIENT)
Dept: HEALTH INFORMATION MANAGEMENT | Facility: CLINIC | Age: 79
End: 2019-09-03

## 2019-09-19 ENCOUNTER — OFFICE VISIT (OUTPATIENT)
Dept: INTERNAL MEDICINE | Facility: CLINIC | Age: 79
End: 2019-09-19
Payer: COMMERCIAL

## 2019-09-19 VITALS
DIASTOLIC BLOOD PRESSURE: 61 MMHG | OXYGEN SATURATION: 99 % | SYSTOLIC BLOOD PRESSURE: 153 MMHG | TEMPERATURE: 98 F | BODY MASS INDEX: 26.45 KG/M2 | HEART RATE: 46 BPM | WEIGHT: 128.6 LBS

## 2019-09-19 DIAGNOSIS — R05.3 CHRONIC COUGH: ICD-10-CM

## 2019-09-19 DIAGNOSIS — E78.2 MIXED HYPERLIPIDEMIA: ICD-10-CM

## 2019-09-19 DIAGNOSIS — I10 BENIGN ESSENTIAL HYPERTENSION: Primary | ICD-10-CM

## 2019-09-19 RX ORDER — LOSARTAN POTASSIUM 50 MG/1
100 TABLET ORAL DAILY
Qty: 180 TABLET | Refills: 0 | Status: SHIPPED | OUTPATIENT
Start: 2019-09-19 | End: 2020-07-14

## 2019-09-19 RX ORDER — OMEPRAZOLE 40 MG/1
CAPSULE, DELAYED RELEASE ORAL
Refills: 1 | COMMUNITY
Start: 2019-09-03 | End: 2019-10-17

## 2019-09-19 ASSESSMENT — PAIN SCALES - GENERAL: PAINLEVEL: MILD PAIN (3)

## 2019-09-19 NOTE — PATIENT INSTRUCTIONS
[facility] Medication Refill Request Information:  * Please contact your pharmacy regarding ANY request for medication refills.  ** [facility] Prescription Fax = [phone]  * Please allow 3 business days for routine medication refills.  * Please allow 5 business days for controlled substance medication refills.     [facility] Test Result notification information:  *You will be notified with in 7-10 days of your appointment day regarding the results of your test.  If you are on MyChart you will be notified as soon as the provider has reviewed the results and signed off on them.    [facility]: [phone]     Patient Education     Taking Your Blood Pressure  Blood pressure is the force of blood against the artery wall as it moves from the heart through the blood vessels. You can take your own blood pressure reading using a digital monitor. Take your readings the same each time, using the same arm. Take readings as often as your healthcare provider instructs.  About blood pressure monitors  Blood pressure monitors are designed for certain ages and cases. You can find monitors for older adults, for pregnant women, and for children. Make sure the one you choose is the right one for your age and situation.  The American Heart Association recommends an automatic cuff monitor that fits on your upper arm (bicep). The cuff should fit your arm size. A cuff that s too large or too small will not give an accurate reading. Measure around your upper arm to find your size.  Monitors that attach to your finger or wrist are not as accurate as monitors for your upper arm.  Ask your healthcare provider for help in choosing a monitor. Bring your monitor to your next provider visit if you need help in using it the correct way.  The steps below are general instructions for using an automatic digital monitor.  Step 1. Relax      Take your blood pressure at the same time every day, such as in the morning or evening,  or at the time your healthcare provider recommends.    Wait at least a half-hour after smoking, eating, or exercising. Don't drink coffee, tea, soda, or other caffeinated beverages before checking your blood pressure.    Sit comfortably at a table with both feet on the floor. Do not cross your legs or feet. Place the monitor near you.    Rest for a few minutes before you begin.  Step 2. Wrap the cuff      Place your arm on the table, palm up. Your arm should be at the level of your heart. Wrap the cuff around your upper arm, just above your elbow. It s best done on bare skin, not over clothing. Most cuffs will indicate where the brachial artery (the blood vessel in the middle of the arm at the inner side of the elbow) should line up with the cuff. Look in your monitor's instruction booklet for an illustration. You can also bring your cuff to your healthcare provider and have them show you how to correctly place the cuff.  Step 3. Inflate the cuff      Push the button that starts the pump.    The cuff will tighten, then loosen.    The numbers will change. When they stop changing, your blood pressure reading will appear.    Take 2 or 3 readings one minute apart.  Step 4. Write down the results of each reading      Write down your blood pressure numbers for each reading. Note the date and time. Keep your results in one place, such as a notebook. Even if your monitor has a built-in memory, keep a hard copy of the readings.    Remove the cuff from your arm. Turn off the machine.    Bring your blood pressure records with your healthcare providers at each visit.    If you start a new blood pressure medicine, note the day you started the new medicine. Also note the day if you change the dose of your medicine. This information goes on your blood pressure recording sheet. This will help your healthcare provider monitor how well the medicine changes are working.    Ask your healthcare provider what numbers should prompt you to  call him or her. Also ask what numbers should prompt you to get help right away.  Date Last Reviewed: 11/1/2016 2000-2018 [employer]. [address] [place] [address]. All rights reserved. This information is not intended as a substitute for professional medical care. Always follow your healthcare professional's instructions.         Patient Education     Tips to Control Acid Reflux    To control acid reflux, you ll need to make some basic diet and lifestyle changes. The simple steps outlined below may be all you ll need to ease discomfort.  Watch what you eat    Avoid fatty foods and spicy foods.    Eat fewer acidic foods, such as citrus and tomato-based foods. These can increase symptoms.    Limit drinking alcohol, caffeine, and fizzy beverages. All increase acid reflux.    Try limiting chocolate, peppermint, and spearmint. These can worsen acid reflux in some people.  Watch when you eat    Avoid lying down for 3 hours after eating.    Do not snack before going to bed.  Raise your head  Raising your head and upper body by 4 to 6 inches helps limit reflux when you re lying down. Put blocks under the head of your bed frame to raise it.  Other changes    Lose weight, if you need to    Don t exercise near bedtime    Avoid tight-fitting clothes    Limit aspirin and ibuprofen    Stop smoking   Date Last Reviewed: 7/1/2016 2000-2018 [employer]. [address] [place] [address]. All rights reserved. This information is not intended as a substitute for professional medical care. Always follow your healthcare professional's instructions.         Patient Education     Medicines for Acid Reflux  Your healthcare provider has told you that you have acid reflux. This condition causes stomach acid to wash up into your throat. For most people, acid reflux is troubling but not dangerous. But left untreated, acid reflux sometimes damages the esophagus. Medicines can help control  acid reflux and limit your risk of future problems.  Medicines for acid reflux  Your healthcare provider may prescribe medicine to help treat your acid reflux. Medicine will be based on your symptoms and any test results. Your provider will explain how to take your medicine. You will also be told about possible side effects.  Reducing stomach acid  Your provider may suggest antacids that you can buy over the counter. Antacids can give fast relief. Or you may be told to take a type of medicine called H2 blockers. These are available over the counter and by prescription (for higher doses).  Blocking stomach acid  In more severe cases, your healthcare provider may suggest stronger medicines such as proton pump inhibitors (PPIs). These keep the stomach from making acid. They are often prescribed for long-term use.  Other medicines  In some cases medicines to reduce or block stomach acid may not work. Then you may be switched to another type of medicine that helps your stomach empty better.     Date Last Reviewed: 10/1/2016    6472-1072 [employer]. [address] [place] [address], [place] [address]. All rights reserved. This information is not intended as a substitute for professional medical care. Always follow your healthcare professional's instructions.

## 2019-09-19 NOTE — NURSING NOTE
Chief Complaint   Patient presents with     Cough     pt here for cough, she has had for 2-3 months       Mayra Sinha CMA at 10:49 AM on 9/19/2019.

## 2019-09-19 NOTE — PROGRESS NOTES
Chief Complaint   Cough  F/u HTN    History of Present Illness   Alena Coronel is a 79 year old female with a history of GERD, osteoporosis, chronic cough who presents for follow-up of cough and HTN.     Cough: Chronic cough for 2-3 months, productive of white, thick sputum. No hemoptysis or chest pain. Has improved significantly with omeprazole. No abdominal pain, nausea, or vomiting. Had chest CT in August and saw Dr. Ward from Pulmonology at [facility] in early September, who reportedly told patient he is not concerned about cancer or infectious process and to f/u in 1 year. She is concerned she hasn't received any reports from the PFTs that were performed at that visit, and has another follow-up appointment with Pulmonology here at the [facility] in November.    HTN: She is concerned that her BP was high in the office today (initially 170s systolic, 150s on recheck), but notes she took her BP medications a little later in the day than she normally does (20 minutes prior to visit). She takes losartan 50 mg and hydrochlorothiazide 25 mg daily, and reports no recent missed medications. She has had ambulatory studies done in the past that confirmed stage II hypertension. She denies any chest pain, blurred vision, or headaches.     Incontinence: reports long-standing incontinence of urine that occurs with forceful coughing. This has improved as her cough has improved. She does wear diapers most of the time. No other urinary symptoms. No changes in bowel habits. No back pain.     Medications and allergies were reviewed by me today.     Social History   History   Smoking Status     Never Smoker   Smokeless Tobacco     Never Used     Comment: smoke free household.          Past Medical History   Patient Active Problem List   Diagnosis     Generalized osteoarthrosis, unspecified site     Irritable bowel syndrome     Adjustment disorder with mixed anxiety and depressed mood     Insomnia     Eczema     Hypertension  goal BP (blood pressure) < 140/90     Hyperlipidemia LDL goal <130     Chronic neck pain     Hypokalemia     Osteoporosis     Chest pain     Idiopathic thrombocytopenia (H)     RUQ abdominal pain     Immune to hepatitis A     Dysphagia     GERD (gastroesophageal reflux disease)     Advanced directives, counseling/discussion     Macular drusen     PVD (posterior vitreous detachment)     Venous stasis     Anxiety     Cataract     TMJ (temporomandibular joint syndrome)     Urinary incontinence     Chronic cough     Digestive problems     Lumbago     Low back pain with bilateral sciatica     Palpitations     Chronic pruritus     Disorder of bone and cartilage     Current Outpatient Medications   Medication Sig Dispense Refill     albuterol (PROAIR RESPICLICK) 108 (90 Base) MCG/ACT inhaler Inhale 2 puffs into the lungs every 6 hours as needed for shortness of breath / dyspnea or wheezing 1 Inhaler 1     Albuterol (VENTOLIN IN) 2-4 puffs every 4-6 hours as needed.Shannon Dao LPN 3:39 PM on 10/29/2018       alendronate (FOSAMAX) 70 MG tablet Take 1 tablet (70 mg) by mouth every 7 days 12 tablet 3     atorvastatin (LIPITOR) 40 MG tablet Take 1 tablet (40 mg) by mouth daily 90 tablet 3     Blood Pressure Monitor KIT 1 Device daily 1 kit 1     Calcium Carbonate-Vit D-Min (CALCIUM 1200 PO) Take 1,000 Units by mouth 1 tab daily       Cholecalciferol (VITAMIN D) 1000 UNITS capsule Take 5,000 Units by mouth daily        [employer] OMEGA-3 KRILL  MG CAPS Take  by mouth.       Flaxseed, Linseed, POWD Take 1-2 tsp by mouth daily.       fluocinonide (LIDEX) 0.05 % ointment Apply topically 2 times daily       fluticasone (FLONASE) 50 MCG/ACT nasal spray Spray 1 spray into both nostrils daily (Patient taking differently: Spray 1 spray into both nostrils as needed ) 1 Bottle 3     Ginger, Zingiber officinalis, (GINGER PO) Take 1 capsule by mouth daily       guaiFENesin-codeine (ROBITUSSIN AC) 100-10 MG/5ML solution Take 5 mLs by  mouth every 6 hours as needed for cough 118 mL 0     hydrochlorothiazide (HYDRODIURIL) 25 MG tablet Take 1 tablet (25 mg) by mouth daily 90 tablet 0     losartan (COZAAR) 50 MG tablet Take 2 tablets (100 mg) by mouth daily 180 tablet 0     mometasone furoate (ASMANEX HFA) 200 MCG/ACT inhaler Inhale 2 puffs into the lungs 2 times daily 3 Inhaler 3     omeprazole (PRILOSEC) 40 MG DR capsule TAKE ONE CAPSULE BY MOUTH TWICE A DAY BEFORE MEALS  1     Probiotic Product (PROBIOTIC PO)        sertraline (ZOLOFT) 100 MG tablet   0     sertraline (ZOLOFT) 50 MG tablet Take 1.5 tablets (75 mg) by mouth daily 135 tablet 0     traZODone (DESYREL) 100 MG tablet Take 1 tablet (100 mg) by mouth At Bedtime 90 tablet 0     triamcinolone (KENALOG) 0.5 % OINT ointment Apply to affected area twice a day, when rash is active 30 g 0     TURMERIC PO        Allergies   Allergen Reactions     Penicillins Rash     Shellfish-Derived Products Itching and Rash     Sulfacetamide Rash         Review of Systems   10 point ROS completed and negative except noted above    Physical Exam   BP (!) 179/69 (BP Location: Right arm, Patient Position: Sitting, Cuff Size: Adult Regular)   Pulse (!) 46   Temp 98  F (36.7  C) (Oral)   Wt 58.3 kg (128 lb 9.6 oz)   SpO2 99%   BMI 26.45 kg/m    BP taken 30 minutes after taking usual medications  BP Readings from Last 6 Encounters:   09/19/19 (!) 153/61   08/14/19 132/75   08/09/19 (!) 185/72   07/23/19 158/73   07/03/19 164/75   06/26/19 140/72     Gen: no distress, comfortable, pleasant   Eyes: anicteric, EOMI, PERRL  Cardiovascular: regular rate and rhythm, normal S1 and S2, no murmurs appreciated  Respiratory: clear to auscultation bilaterally, normal work of breathing  Gastrointestinal: soft, nontender, nondistended  Skin: no concerning lesions, no jaundice   Psychological: appropriate mood     Assessment & Plan   Benign essential hypertension  On chart review, BP has been elevated at the past several  visits, and prio ambulatory monitoring showed correlation between office and home readings. Discussed with patient that we would advise an increase in her losartan to 100 mg daily and she is agreeable with this plan. Recommended return in 1 month after having labs done to check BMP, and advised contacting the clinic with any adverse reactions to the increase including dizziness.   - losartan (COZAAR) 50 MG tablet  Dispense: 180 tablet; Refill: 0  - Basic metabolic panel    Mixed hyperlipidemia  On stable dose of atorvastatin. Last lipid panel done 9/2018. Will order recheck to be drawn in 1 month.   - Lipid panel reflex to direct LDL Fasting    Cough  Suspect cough 2/2 GERD, especially given improvement with omeprazole. Advised she continue this. Also counseled on dosing and administration instructions for alendronate, as this can exacerbate her GERD symptoms if not taken correctly. Recommended continue communication and follow-up with pulmonology regarding her PFT and CT results.   - Continue omeprazole 40 mg BID  - F/u with pulmonology      RTC: Return in about 4 weeks (around 10/17/2019) for Routine Visit, BP Recheck.    Patient seen and discussed with Dr. Luna, supervising physician.   Francia Barrera, MS4    Teaching Physician  Disclosure:    I was present with the medical student who participated in the service and in the documentation of this note.  I have verified the history and personally performed the physical exam and medical decision making, and have verified the content of the note, which accurately reflects my assessment of the patient and the plan of care    Ling Luna M.D.  Internal Medicine   pager [phone]

## 2019-09-30 ENCOUNTER — DOCUMENTATION ONLY (OUTPATIENT)
Dept: CARE COORDINATION | Facility: CLINIC | Age: 79
End: 2019-09-30

## 2019-09-30 ENCOUNTER — HEALTH MAINTENANCE LETTER (OUTPATIENT)
Age: 79
End: 2019-09-30

## 2019-10-05 DIAGNOSIS — F41.9 ANXIETY: ICD-10-CM

## 2019-10-08 RX ORDER — METOPROLOL SUCCINATE 25 MG/1
TABLET, EXTENDED RELEASE ORAL
Qty: 90 TABLET | Refills: 1 | OUTPATIENT
Start: 2019-10-08

## 2019-10-08 NOTE — TELEPHONE ENCOUNTER
Medication requested: ZOLOFT 50 MG TAB  Last refilled: 5/14/19  Qty: 135      Last seen: 8/14/19  RTC: 3 MONTHS  Cancel: 0  No-show: 0  Next appt: 0    Refill decision:   30 day jer refill sent to the pharmacy - including instructions for patient to call the clinic and schedule an appointment.  Scheduling has been notified to contact the pt for appointment.

## 2019-10-08 NOTE — TELEPHONE ENCOUNTER
"Called pharmacy. Refill request was \"auto request\".  Informed pharmacy per notes med was discontinued 7/23.  "

## 2019-10-17 ENCOUNTER — OFFICE VISIT (OUTPATIENT)
Dept: INTERNAL MEDICINE | Facility: CLINIC | Age: 79
End: 2019-10-17
Payer: COMMERCIAL

## 2019-10-17 VITALS
WEIGHT: 123 LBS | DIASTOLIC BLOOD PRESSURE: 84 MMHG | SYSTOLIC BLOOD PRESSURE: 170 MMHG | OXYGEN SATURATION: 99 % | BODY MASS INDEX: 25.3 KG/M2 | TEMPERATURE: 98.3 F | HEART RATE: 62 BPM

## 2019-10-17 DIAGNOSIS — I10 BENIGN ESSENTIAL HYPERTENSION: ICD-10-CM

## 2019-10-17 DIAGNOSIS — J45.30 MILD PERSISTENT ASTHMA WITHOUT COMPLICATION: ICD-10-CM

## 2019-10-17 DIAGNOSIS — R05.9 COUGH: Primary | ICD-10-CM

## 2019-10-17 DIAGNOSIS — E78.2 MIXED HYPERLIPIDEMIA: ICD-10-CM

## 2019-10-17 DIAGNOSIS — K21.9 GASTROESOPHAGEAL REFLUX DISEASE WITHOUT ESOPHAGITIS: ICD-10-CM

## 2019-10-17 LAB
ANION GAP SERPL CALCULATED.3IONS-SCNC: 9 MMOL/L (ref 3–14)
BUN SERPL-MCNC: 8 MG/DL (ref 7–30)
CALCIUM SERPL-MCNC: 8.8 MG/DL (ref 8.5–10.1)
CHLORIDE SERPL-SCNC: 93 MMOL/L (ref 94–109)
CHOLEST SERPL-MCNC: 142 MG/DL
CO2 SERPL-SCNC: 28 MMOL/L (ref 20–32)
CREAT SERPL-MCNC: 0.69 MG/DL (ref 0.52–1.04)
GFR SERPL CREATININE-BSD FRML MDRD: 84 ML/MIN/{1.73_M2}
GLUCOSE SERPL-MCNC: 87 MG/DL (ref 70–99)
HDLC SERPL-MCNC: 64 MG/DL
LDLC SERPL CALC-MCNC: 68 MG/DL
NONHDLC SERPL-MCNC: 78 MG/DL
POTASSIUM SERPL-SCNC: 3.3 MMOL/L (ref 3.4–5.3)
SODIUM SERPL-SCNC: 130 MMOL/L (ref 133–144)
TRIGL SERPL-MCNC: 52 MG/DL

## 2019-10-17 RX ORDER — ALBUTEROL SULFATE 90 UG/1
2 AEROSOL, METERED RESPIRATORY (INHALATION) ONCE
Status: ACTIVE | OUTPATIENT
Start: 2019-10-17

## 2019-10-17 RX ORDER — OMEPRAZOLE 40 MG/1
CAPSULE, DELAYED RELEASE ORAL
Qty: 90 CAPSULE | Refills: 1 | Status: SHIPPED | OUTPATIENT
Start: 2019-10-17 | End: 2020-01-30

## 2019-10-17 NOTE — PROGRESS NOTES
[facility]       SUBJECTIVE:  Alena Coronel is a 79 year old female with a PMHx of HTN, who comes in for evaluation of cough.     Currently concerned about cough that she has almost 3 months. Coughs throughout the day, at night the cough is worse. Cough is productive with white sticky mucous. Sometimes vomits with cough. Wears a brief at night because continuous coughing causes incontinence. Endorses night sweats but denies fevers or chills.  Still able to eat although she has lost 5 pounds in the last month. Still maintains good appetite with fiber, yogurt and bread. Does not eat meat, and thinks this may be why she is not losingweight despite having good appetite . Endorses constipation.   Medications and allergies reviewed by me today.     ROS:   Constitutional, HEENT, cardiovascular, pulmonary, gi and gu systems are negative, except as otherwise noted.    OBJECTIVE:    BP (!) 170/84   Pulse 62   Temp 98.3  F (36.8  C) (Oral)   Wt 55.8 kg (123 lb)   SpO2 99%   BMI 25.30 kg/m     Wt Readings from Last 1 Encounters:   10/17/19 55.8 kg (123 lb)       GENERAL APPEARANCE: healthy, alert and no distress     EYES: EOMI, PERRL     HENT: ear canals and TM's normal and nose and mouth without ulcers or lesions     RESP: lungs clear to auscultation - no rales, rhonchi or wheezes     CV: regular rates and rhythm, normal S1 S2, no S3 or S4 and no murmur, click or rub     ABDOMEN:  soft, mild tenderness to palpation diffusely, no rebound or guarding, no HSM or masses and bowel sounds normal     MS: extremities normal- no gross deformities noted, no evidence of inflammation in joints, FROM in all extremities.     SKIN: no suspicious lesions or rashes     NEURO: Normal strength and tone, sensory exam grossly normal, mentation intact and speech normal     PSYCH: mentation appears normal. and affect normal/bright     ASSESSMENT/PLAN:    Alena was seen today for cough.    Diagnoses and all  orders for this visit:    Cough  Etiology of cough is unclear. Suspect that cough may be a symptoms of underlying airway disease that is undiagnosed. On previous evaluation she was instructed to have PFTs and be evaluated by pulmonology. Dicussed with patient that first she should have PFTs then resume inhalers that she was previously prescribed (patient has not been using inhalers for a few years for unclear reason but denies knowledge of pulm diagnosis).   -     Quantiferon TB Gold Plus    Mild persistent asthma without complication  -     mometasone furoate (ASMANEX HFA) 200 MCG/ACT inhaler; Inhale 2 puffs into the lungs 2 times daily  -     albuterol (PROAIR HFA/PROVENTIL HFA/VENTOLIN HFA) 108 (90 Base) MCG/ACT inhaler 2 puff    Gastroesophageal reflux disease without esophagitis  -     omeprazole (PRILOSEC) 40 MG DR capsule; TAKE ONE CAPSULE BY MOUTH TWICE A DAY BEFORE MEALS         Pt should return to clinic for f/u in clinic with PCP after seeing pulmonology.     Jenny Henry MD  Internal Medicine PGY2  [id number]  Oct 17, 2019    Plan of care discussed with Dr. Luna.     Teaching Physician Note:    While the patient was in clinic, I reviewed the patient's medical history and results, the resident's findings on physical examination, and the patient's diagnosis and treatment plan with the resident.  I agree with the information documented with the following exceptions: none.    Ling Luna M.D.  Internal Medicine  [facility]   pager [phone]

## 2019-10-17 NOTE — NURSING NOTE
Chief Complaint   Patient presents with     Cough     Pt is here to discuss a productive cough. Duration 3 months.      Martha Pimentel LPN at 8:17 AM on 10/17/2019.

## 2019-10-17 NOTE — PATIENT INSTRUCTIONS
[facility] Medication Refill Request Information:  * Please contact your pharmacy regarding ANY request for medication refills.  ** [facility] Prescription Fax = [phone]  * Please allow 3 business days for routine medication refills.  * Please allow 5 business days for controlled substance medication refills.     [facility] Test Result notification information:  *You will be notified with in 7-10 days of your appointment day regarding the results of your test.  If you are on MyChart you will be notified as soon as the provider has reviewed the results and signed off on them.    [facility]: [phone]          [facility]         [facility] Medicine Resident                   Continuity Clinic    Who We Are    Resident Continuity Clinic is a part of the [facility].  Resident physicians see patients independently and establish a relationship with them over the course of their three-year residency program.  As with the Primary Care Clinic, our Resident Continuity Clinic models a group practice.  If your doctor is not available, you will be able to see another resident physician.  At the end of a resident s training, patients will be transitioned to a new resident physician for ongoing care.     We treat patients with a wide array of medical needs from routine physicals, to acute illnesses, to diabetes and blood pressure management, to complex medical illness.  What is a Resident Physician?    Resident physicians hold medical degrees and are doctors. They are training to become specialists in Internal Medicine. They work under the supervision of board-certified faculty physicians.  Expectations for Your Care    We strive to provide accessible, quality care at all times.    In order to provide this care, it is best to see your primary care resident doctor consistently rather switching between providers.  In the event you do see another physician, you should schedule  a follow-up visit with your usual primary care doctor.    If you are transitioning your care from another clinic, it is helpful to have your records available for your doctor to review.    We do not prescribe controlled substances, such as ADD medications or narcotic pain medications, on your first visit.  We will review your health records and concerns prior to devising a treatment plan with you in order to provide the best care.      Clinic Services     Extended clinic hours; patient  to help navigate your visit;  parking; laboratory and imaging services with evening and weekend hours    Multiple medical and surgical specialties in one building    Complementary services, including Nutrition, Integrative Medicine, Pharmacy consultations, Mental and Behavioral Health, Sports Medicine and Physical Therapy    Thank You    We would like to thank you for choosing the [facility] Resident Continuity Clinic for your primary care. You are making a priceless contribution to the training of the next generation of health care practitioners.     Contact us at [phone] for appointments or questions.    Resident Clinic Hours are Tuesdays and Thursdays, 7:30am-5:00pm    Residents   Mack Rahman MD  (Male)   Brodie Plummer MD   (Male)   Liya Wolfe MD  (Female)  Milana Harper MD   (Female)   Sydnee Truong MD   (Female)    Zuleika Ibarra MD    (Female)   Diogenes Gresham MD  (Male)   Yasmany Randolph MD  (Male)    Joelle Jaramillo MD  (Female)   Kindra Gaytan MD  (Female)   Jenny Henry MD    (Female)   Joe Ansari MD  (Male)   Puma Mckenzie MD  (Male)   Conner Pérez MD  (Male)   GENESIS Rodriguez MD   (Male)   Arvin Calzada MD  (Male)    Alis Kim MD (Female)   Fran Stark MD  (Male)   Jerrod Lopez MD  (Male)   Marline Sr MD  (Male)   Viki Carson MD    (Female)   Germania Drew MD  (Female)     Supervising Physicians   MD Leon Espinosa,  MD Nicky Meyer, MD Thien Locke, MD Krzysztof Pham, MD Dia Balderrama, MD Krupa Gonzales, MD Haja Branch, MD Lanette Berger MD

## 2019-10-18 DIAGNOSIS — R06.02 SOB (SHORTNESS OF BREATH): ICD-10-CM

## 2019-10-21 RX ORDER — LOSARTAN POTASSIUM 50 MG/1
50 TABLET ORAL DAILY
Qty: 30 TABLET | Refills: 0 | OUTPATIENT
Start: 2019-10-21

## 2019-10-22 LAB
DLCOUNC-%PRED-PRE: 100 %
DLCOUNC-PRE: 15.66 ML/MIN/MMHG
DLCOUNC-PRED: 15.62 ML/MIN/MMHG
ERV-%PRED-PRE: 17 %
ERV-PRE: 0.08 L
ERV-PRED: 0.47 L
EXPTIME-PRE: 7.3 SEC
FEF2575-%PRED-PRE: 76 %
FEF2575-PRE: 1.05 L/SEC
FEF2575-PRED: 1.37 L/SEC
FEFMAX-%PRED-PRE: 97 %
FEFMAX-PRE: 4.15 L/SEC
FEFMAX-PRED: 4.26 L/SEC
FEV1-%PRED-PRE: 90 %
FEV1-PRE: 1.4 L
FEV1FEV6-PRE: 75 %
FEV1FEV6-PRED: 78 %
FEV1FVC-PRE: 76 %
FEV1FVC-PRED: 79 %
FEV1SVC-PRE: 74 %
FEV1SVC-PRED: 70 %
FIFMAX-PRE: 3.19 L/SEC
FVC-%PRED-PRE: 93 %
FVC-PRE: 1.84 L
FVC-PRED: 1.97 L
IC-%PRED-PRE: 103 %
IC-PRE: 1.8 L
IC-PRED: 1.74 L
VA-%PRED-PRE: 87 %
VA-PRE: 3.19 L
VC-%PRED-PRE: 84 %
VC-PRE: 1.88 L
VC-PRED: 2.22 L

## 2019-11-06 ENCOUNTER — TRANSFERRED RECORDS (OUTPATIENT)
Dept: HEALTH INFORMATION MANAGEMENT | Facility: CLINIC | Age: 79
End: 2019-11-06

## 2019-11-06 ENCOUNTER — PRE VISIT (OUTPATIENT)
Dept: PULMONOLOGY | Facility: CLINIC | Age: 79
End: 2019-11-06

## 2019-11-12 NOTE — PROGRESS NOTES
[facility]    Pulmonary, Allergy, Critical Care and Sleep Medicine    Initial Clinic Visit  11/13/2019    Alena Coronel MRN# [id number]   Age: 79 year old YOB: 1940        Assessment and Recommendations:    Alena Coronel is a 79 year old female with a history of GERD, IBS, HTN, Osteoporosis who presents for evaluation of chronic cough.    Chronic Cough  GERD  Long history of reflux symptoms. Last EGD in 2012 at which time had chronic inflammatory changes, recommendation for an antireflux regimen indefinitely. Appears that has been on PPI therapy inconsistently, in part potentially due to non-compliance. Currently reports daily use of her omeprazole which believe is helping her cough. PFTs and CXR today are normal and does not report any other concerning respiratory symptoms. Reports regular exercise without difficulty and may not even need the asthma inhaler she is currently taking. Should continue to follow with Primary Care and if symptoms are stable to improved could consider de-escalating of the Asmanex. Am concerned about reported mold in her home and stressed that this could lead to a dangerous lung infection. Patient planning to talk to her family about this and making repairs.  - Continue omeprazole  - Can consider stop of Asmanex in the future if symptoms continue to improve  - If cough symptoms become more severe should return to Pulmonary clinic and return to GI clinic  - Discussed minimizing dust and removing mold from her home  - Has already received her flu shot    Return to Pulmonary clinic as needed based on cough symptoms    Seen and discussed with Dr. Aminah Marcial MD PhD  Pulmonary and Critical Care Fellow   Pager [phone]      I saw and evaluated patient with Fellow.  Case discussed - agree with note.  I reviewed PFT and CXR: normal.  History was obtained via .    LUCÍA MOURA M.D.        Chief Complaint and History of Present  Illness:    CC:  Chronic cough    HPI:     History taken from patient and chart review. Patient visit conducted with some assistance from [facility] .    Per chart seen in Primary Care multiple times in past with cough, wheezing, pleuritic chest pain (as early as 2008). She has been diagnosed with bronchitis and given azithromycin. Seen for a number of years through [facility] including several visits with Dr. Valle in Pulmonology (outside clinic) for recurring cough. Had mild asthma symptoms with start of PRN albuterol and later due to worsening symptoms daily inhaled steroid therapy.    In spring 2019 reporting night sweats accompanied by dry mouth. No history of positive PPD or lymphadenopathy and though potentially related to her sertraline. Seen again in May for cough and started on PPI and Singulair, encouraged to take her Asmanex as prescribed. In July reported used PPI inconsistently. Was also getting some natural therapies from her acupuncturist that reportedly helped. Sent for a CT in August that showed clustered nodularity in lateral RUL (thought infectious/inflammatory) and small nodules in RUL up to 4 mm at which point referred to Pulmonary.    Seen in September by [facility]. At this time reported cough had been present for months. Was productive of white sputum, worse with lying down, and associated with incontinence and heartburn. Cough thought due to reflux, had potentially been off her PPI and it was re-prescribed. Nodules thought related to old infection and recommendation for repeat CT in one year. Reportedly improved on omeprazole. Seen in Primary Care for cough in October. PFTs were normal. Possibly not taking any of her inhalers and discussion on how to use going forward.    Today patient reports that is concerned because she had a cough about ten years ago that went away but is now back. Reports that cough symptoms were especially bad in the last several months but in  the last month things have improved. She thinks this is due to taking a medication that her family ordered for her from [place]. Medication bottle is mostly in a different language but does have a label stating is for decreasing cough and phlegm. Ingredients are listed in English and include a number of herbs/substances used in Chinese Medicine. Around the time she started this medicine also started taking omeprazole and reports taking that everyday without missing days. Cough is now mostly in the morning and a little at night when she first goes to bed. Used to produce white sputum in the morning but now cough is mostly dry, does not cough overnight. Previously had some wheezing but thinks that has also improved now that the cough is improved. Is able to walk 20-30 minutes on a treadmill without wheezing or cough. No heartburn symptoms, no fevers, chills, night sweats. Did have a feeling sometimes like food was slow to go down her esophagus but that feeling stopped when started the omeprazole. No odynophagia, loss of appetite or weight loss.    Has lived in the  since 1979. Last traveled abroad in 2012 going back to [place] in addition to [place] and [place]. Never smoker, denied being around open fires for cooking. When asked did report that she feels her home is very mary. There is some mold and carpet downstairs where she goes to exercise on a regular basis. When asked if it was possible to remove the dust she reports she will have to talk to her son. For the last few weeks there have been dogs in the house that belong to a visiting family member.     Review of Systems:  Complete 12 point ROS negative unless mentioned in HPI    Histories, Prior to Admission Medications, Allergies:    Past Medical History:  Past Medical History:   Diagnosis Date     Adjustment disorder with mixed anxiety and depressed mood      Anxiety      Chronic cough      Chronic neck pain 5/19/2010     Chronic pruritus      Digestive  problems      Dysphagia      GERD (gastroesophageal reflux disease)      Hyperlipidemia LDL goal <130 5/9/2010     Hypertension goal BP (blood pressure) < 140/90      Hypokalemia      Insomnia      Intestinal disaccharidase deficiencies and disaccharide malabsorption      Localized osteoarthrosis not specified whether primary or secondary, hand 2008     Low back pain with bilateral sciatica      Osteopenia      Osteoporosis, unspecified      Palpitations      Urinary incontinence      Past Surgical History:  Past Surgical History:   Procedure Laterality Date     BIOPSY OF BREAST, INCISIONAL  1985     C APPENDECTOMY  1975     C LIGATE FALLOPIAN TUBE  1975     COLONOSCOPY  11/14/2008     Past Social History:  Social History     Socioeconomic History     Marital status:      Spouse name: Bonilla     Number of children: 5     Years of education: Not on file     Highest education level: Not on file   Occupational History     Occupation:      Employer: RETIRED     Comment: /social service for [employer]     Employer: [employer]   Social Needs     Financial resource strain: Not on file     Food insecurity:     Worry: Not on file     Inability: Not on file     Transportation needs:     Medical: Not on file     Non-medical: Not on file   Tobacco Use     Smoking status: Never Smoker     Smokeless tobacco: Never Used     Tobacco comment: smoke free household.   Substance and Sexual Activity     Alcohol use: Yes     Alcohol/week: 1.0 - 2.0 standard drinks     Types: 1 - 2 Glasses of wine per week     Frequency: 2-4 times a month     Drinks per session: 1 or 2     Comment: up to 3 glasses after dinner every week     Drug use: No     Sexual activity: Yes     Partners: Male   Lifestyle     Physical activity:     Days per week: Not on file     Minutes per session: Not on file     Stress: Not on file   Relationships     Social connections:     Talks on phone: Not on file     Gets  together: Not on file     Attends Holiness service: Not on file     Active member of club or organization: Not on file     Attends meetings of clubs or organizations: Not on file     Relationship status: Not on file     Intimate partner violence:     Fear of current or ex partner: Not on file     Emotionally abused: Not on file     Physically abused: Not on file     Forced sexual activity: Not on file   Other Topics Concern     Parent/sibling w/ CABG, MI or angioplasty before 65F 55M? No      Service Not Asked     Blood Transfusions Not Asked     Caffeine Concern Not Asked     Comment: Occasional Coffee     Occupational Exposure Not Asked     Hobby Hazards Not Asked     Sleep Concern Not Asked     Stress Concern Not Asked     Weight Concern Not Asked     Special Diet Not Asked     Back Care Not Asked     Exercise Not Asked     Comment: Biking at home, Treadmill - Everyday     Bike Helmet Not Asked     Seat Belt Not Asked     Self-Exams Not Asked   Social History Narrative    Originally from [place], immigrated . .  5 Children (1st child ). Retired, [employer] clinic helped with mentally ill patients.         Family History:  Family History   Problem Relation Age of Onset     Other - See Comments Mother          during childbirth     Gastrointestinal Disease Father         Stomach/Liver Problems     Other - See Comments Sister          in [place], presented as bad headache     Mental Illness Sister         ?schizophrenia, paranoid     Vision Loss Sister      Mental Illness Brother      Diabetes Sister      Glaucoma No family hx of      Macular Degeneration No family hx of      Medications:    albuterol  2 puff Inhalation Once     Current Outpatient Medications   Medication     albuterol (PROAIR RESPICLICK) 108 (90 Base) MCG/ACT inhaler     Albuterol (VENTOLIN IN)     alendronate (FOSAMAX) 70 MG tablet     atorvastatin (LIPITOR) 40 MG tablet     Blood Pressure Monitor KIT     Calcium  "Carbonate-Vit D-Min (CALCIUM 1200 PO)     Cholecalciferol (VITAMIN D) 1000 UNITS capsule     [employer] OMEGA-3 KRILL  MG CAPS     Flaxseed, Linseed, POWD     fluocinonide (LIDEX) 0.05 % ointment     fluticasone (FLONASE) 50 MCG/ACT nasal spray     Aylin, Zingiber officinalis, (AYLIN PO)     guaiFENesin-codeine (ROBITUSSIN AC) 100-10 MG/5ML solution     hydrochlorothiazide (HYDRODIURIL) 25 MG tablet     losartan (COZAAR) 50 MG tablet     mometasone furoate (ASMANEX HFA) 200 MCG/ACT inhaler     omeprazole (PRILOSEC) 40 MG DR capsule     Probiotic Product (PROBIOTIC PO)     sertraline (ZOLOFT) 100 MG tablet     sertraline (ZOLOFT) 50 MG tablet     traZODone (DESYREL) 100 MG tablet     triamcinolone (KENALOG) 0.5 % OINT ointment     TURMERIC PO     Current Facility-Administered Medications   Medication     albuterol (PROAIR HFA/PROVENTIL HFA/VENTOLIN HFA) 108 (90 Base) MCG/ACT inhaler 2 puff     Allergies:  Allergies   Allergen Reactions     Penicillins Rash     Shellfish-Derived Products Itching and Rash     Sulfacetamide Rash     Physical Exam:       BP (!) 143/63   Pulse 52   Resp 17   Ht 1.486 m (4' 10.5\")   Wt 55.8 kg (123 lb)   SpO2 98%   BMI 25.27 kg/m      General: Alert, sitting up, NAD  HEENT: anicteric, moist mucosa, no oral lesions  Neck: no palpable lymphadenopathy  Chest: CTAB, no wheezing or crackles  Cardiac: RRR no murmurs  Abdomen: Soft, flat  Extremities: No LE Edema, cyanosis or clubbing  Neuro: A&Ox3, no focal deficits, normal gait   Skin: no rash noted on limited exam  Psych: Bright affect    Laboratory, imaging, and microbiologic data:    All laboratory and imaging data reviewed, pertinent results discussed above.    CXR 11/13/2019  HISTORY: Cough  COMPARISON: CT 8/9/2019, chest x-ray 12/1/2018     FINDINGS: PA and lateral radiographs of the chest. The trachea is  midline. The cardiac silhouette is within normal limits. No pleural  effusion or pneumothorax. No acute airspace opacities. " Visualized  upper abdomen is unremarkable. Multilevel degenerative changes in the  thoracic spine.                                                                   IMPRESSION: No acute airspace opacities.    Most Recent [facility] Function Testing    FVC-Pred   Date Value Ref Range Status   10/18/2019 1.97 L      FVC-Pre   Date Value Ref Range Status   10/18/2019 1.84 L      FVC-%Pred-Pre   Date Value Ref Range Status   10/18/2019 93 %      FEV1-Pre   Date Value Ref Range Status   10/18/2019 1.40 L      FEV1-%Pred-Pre   Date Value Ref Range Status   10/18/2019 90 %      FEV1FVC-Pred   Date Value Ref Range Status   10/18/2019 79 %      FEV1FVC-Pre   Date Value Ref Range Status   10/18/2019 76 %      No results found for: 20029  FEFMax-Pred   Date Value Ref Range Status   10/18/2019 4.26 L/sec      FEFMax-Pre   Date Value Ref Range Status   10/18/2019 4.15 L/sec      FEFMax-%Pred-Pre   Date Value Ref Range Status   10/18/2019 97 %      ExpTime-Pre   Date Value Ref Range Status   10/18/2019 7.30 sec      FIFMax-Pre   Date Value Ref Range Status   10/18/2019 3.19 L/sec      FEV1FEV6-Pred   Date Value Ref Range Status   10/18/2019 78 %      FEV1FEV6-Pre   Date Value Ref Range Status   10/18/2019 75 %      No results found for: 20055

## 2019-11-13 ENCOUNTER — ANCILLARY PROCEDURE (OUTPATIENT)
Dept: GENERAL RADIOLOGY | Facility: CLINIC | Age: 79
End: 2019-11-13
Attending: INTERNAL MEDICINE
Payer: COMMERCIAL

## 2019-11-13 ENCOUNTER — OFFICE VISIT (OUTPATIENT)
Dept: PULMONOLOGY | Facility: CLINIC | Age: 79
End: 2019-11-13
Attending: INTERNAL MEDICINE
Payer: COMMERCIAL

## 2019-11-13 VITALS
HEART RATE: 52 BPM | BODY MASS INDEX: 24.8 KG/M2 | WEIGHT: 123 LBS | HEIGHT: 59 IN | RESPIRATION RATE: 17 BRPM | OXYGEN SATURATION: 98 % | SYSTOLIC BLOOD PRESSURE: 143 MMHG | DIASTOLIC BLOOD PRESSURE: 63 MMHG

## 2019-11-13 DIAGNOSIS — R05.9 COUGH: Primary | ICD-10-CM

## 2019-11-13 DIAGNOSIS — R05.9 COUGH: ICD-10-CM

## 2019-11-13 PROCEDURE — G0463 HOSPITAL OUTPT CLINIC VISIT: HCPCS | Mod: ZF

## 2019-11-13 PROCEDURE — T1013 SIGN LANG/ORAL INTERPRETER: HCPCS | Mod: U3,ZF

## 2019-11-13 ASSESSMENT — PAIN SCALES - GENERAL: PAINLEVEL: MILD PAIN (3)

## 2019-11-13 ASSESSMENT — MIFFLIN-ST. JEOR: SCORE: 940.61

## 2019-11-13 NOTE — LETTER
11/13/2019     RE: Alena Coronel  [address] [address]     Dear Colleague,    Thank you for referring your patient, Alena Coronel, to the [facility] LUNG SCIENCE AND [facility] at [facility]. Please see a copy of my visit note below.    [facility]    Pulmonary, Allergy, Critical Care and Sleep Medicine    Initial Clinic Visit  11/13/2019    Alena Coronel MRN# [id number]   Age: 79 year old YOB: 1940        Assessment and Recommendations:    Alena Coronel is a 79 year old female with a history of GERD, IBS, HTN, Osteoporosis who presents for evaluation of chronic cough.    Chronic Cough  GERD  Long history of reflux symptoms. Last EGD in 2012 at which time had chronic inflammatory changes, recommendation for an antireflux regimen indefinitely. Appears that has been on PPI therapy inconsistently, in part potentially due to non-compliance. Currently reports daily use of her omeprazole which believe is helping her cough. PFTs and CXR today are normal and does not report any other concerning respiratory symptoms. Reports regular exercise without difficulty and may not even need the asthma inhaler she is currently taking. Should continue to follow with Primary Care and if symptoms are stable to improved could consider de-escalating of the Asmanex. Am concerned about reported mold in her home and stressed that this could lead to a dangerous lung infection. Patient planning to talk to her family about this and making repairs.  - Continue omeprazole  - Can consider stop of Asmanex in the future if symptoms continue to improve  - If cough symptoms become more severe should return to Pulmonary clinic and return to GI clinic  - Discussed minimizing dust and removing mold from her home  - Has already received her flu shot    Return to Pulmonary clinic as needed based on cough symptoms    Seen and discussed with  Dr. Aminah Marcial MD PhD  Pulmonary and Critical Care Fellow   Pager [phone]      I saw and evaluated patient with Fellow.  Case discussed - agree with note.  I reviewed PFT and CXR: normal.  History was obtained via .    LUCÍA MOURA M.D.        Chief Complaint and History of Present Illness:    CC:  Chronic cough    HPI:     History taken from patient and chart review. Patient visit conducted with some assistance from Laotian .    Per chart seen in Primary Care multiple times in past with cough, wheezing, pleuritic chest pain (as early as 2008). She has been diagnosed with bronchitis and given azithromycin. Seen for a number of years through [facility] including several visits with Dr. Valle in Pulmonology (outside clinic) for recurring cough. Had mild asthma symptoms with start of PRN albuterol and later due to worsening symptoms daily inhaled steroid therapy.    In spring 2019 reporting night sweats accompanied by dry mouth. No history of positive PPD or lymphadenopathy and though potentially related to her sertraline. Seen again in May for cough and started on PPI and Singulair, encouraged to take her Asmanex as prescribed. In July reported used PPI inconsistently. Was also getting some natural therapies from her acupuncturist that reportedly helped. Sent for a CT in August that showed clustered nodularity in lateral RUL (thought infectious/inflammatory) and small nodules in RUL up to 4 mm at which point referred to Pulmonary.    Seen in September by [facility]. At this time reported cough had been present for months. Was productive of white sputum, worse with lying down, and associated with incontinence and heartburn. Cough thought due to reflux, had potentially been off her PPI and it was re-prescribed. Nodules thought related to old infection and recommendation for repeat CT in one year. Reportedly improved on omeprazole. Seen in Primary Care for cough in  October. PFTs were normal. Possibly not taking any of her inhalers and discussion on how to use going forward.    Today patient reports that is concerned because she had a cough about ten years ago that went away but is now back. Reports that cough symptoms were especially bad in the last several months but in the last month things have improved. She thinks this is due to taking a medication that her family ordered for her from [place]. Medication bottle is mostly in a different language but does have a label stating is for decreasing cough and phlegm. Ingredients are listed in English and include a number of herbs/substances used in Chinese Medicine. Around the time she started this medicine also started taking omeprazole and reports taking that everyday without missing days. Cough is now mostly in the morning and a little at night when she first goes to bed. Used to produce white sputum in the morning but now cough is mostly dry, does not cough overnight. Previously had some wheezing but thinks that has also improved now that the cough is improved. Is able to walk 20-30 minutes on a treadmill without wheezing or cough. No heartburn symptoms, no fevers, chills, night sweats. Did have a feeling sometimes like food was slow to go down her esophagus but that feeling stopped when started the omeprazole. No odynophagia, loss of appetite or weight loss.    Has [place]. Last traveled abroad in 2012 going back to [place] in addition to [place] and [place]. Never smoker, denied being around open fires for cooking. When asked did report that she feels her home is very mary. There is some mold and carpet downstairs where she goes to exercise on a regular basis. When asked if it was possible to remove the dust she reports she will have to talk to her son. For the last few weeks there have been dogs in the house that belong to a visiting family member.     Review of Systems:  Complete 12 point ROS negative  unless mentioned in HPI    Histories, Prior to Admission Medications, Allergies:    Past Medical History:  Past Medical History:   Diagnosis Date     Adjustment disorder with mixed anxiety and depressed mood      Anxiety      Chronic cough      Chronic neck pain 5/19/2010     Chronic pruritus      Digestive problems      Dysphagia      GERD (gastroesophageal reflux disease)      Hyperlipidemia LDL goal <130 5/9/2010     Hypertension goal BP (blood pressure) < 140/90      Hypokalemia      Insomnia      Intestinal disaccharidase deficiencies and disaccharide malabsorption      Localized osteoarthrosis not specified whether primary or secondary, hand 2008     Low back pain with bilateral sciatica      Osteopenia      Osteoporosis, unspecified      Palpitations      Urinary incontinence      Past Surgical History:  Past Surgical History:   Procedure Laterality Date     BIOPSY OF BREAST, INCISIONAL  1985     C APPENDECTOMY  1975     C LIGATE FALLOPIAN TUBE  1975     COLONOSCOPY  11/14/2008     Past Social History:  Social History     Socioeconomic History     Marital status:      Spouse name: Bonilla     Number of children: 5     Years of education: Not on file     Highest education level: Not on file   Occupational History     Occupation:      Employer: RETIRED     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   Substance and Sexual Activity     Alcohol use: Yes     Alcohol/week: 1.0 - 2.0 standard drinks     Types: 1 - 2 Glasses of wine per week     Frequency: 2-4 times a month     Drinks per session: 1 or 2     Comment:  up to 3 glasses after dinner every week     Drug use: No     Sexual activity: Yes     Partners: Male   Lifestyle     Physical activity:     Days per week: Not on file     Minutes per session: Not on file     Stress: Not on file   Relationships     Social connections:     Talks on phone: Not on file     Gets together: Not on file     Attends Restoration service: Not on file     Active member of club or organization: Not on file     Attends meetings of clubs or organizations: Not on file     Relationship status: Not on file     Intimate partner violence:     Fear of current or ex partner: Not on file     Emotionally abused: Not on file     Physically abused: Not on file     Forced sexual activity: Not on file   Other Topics Concern     Parent/sibling w/ CABG, MI or angioplasty before 65F 55M? No      Service Not Asked     Blood Transfusions Not Asked     Caffeine Concern Not Asked     Comment: Occasional Coffee     Occupational Exposure Not Asked     Hobby Hazards Not Asked     Sleep Concern Not Asked     Stress Concern Not Asked     Weight Concern Not Asked     Special Diet Not Asked     Back Care Not Asked     Exercise Not Asked     Comment: Biking at home, Treadmill - Everyday     Bike Helmet Not Asked     Seat Belt Not Asked     Self-Exams Not Asked   Social History Narrative    Originally from [place], immigrated . .  5 Children (1st child ). Retired, [employer] clinic helped with mentally ill patients.         Family History:  Family History   Problem Relation Age of Onset     Other - See Comments Mother          during childbirth     Gastrointestinal Disease Father         Stomach/Liver Problems     Other - See Comments Sister          in [place], presented as bad headache     Mental Illness Sister         ?schizophrenia, paranoid     Vision Loss Sister      Mental Illness Brother      Diabetes Sister      Glaucoma No family hx of      Macular Degeneration No family hx of   "    Medications:    albuterol  2 puff Inhalation Once     Current Outpatient Medications   Medication     albuterol (PROAIR RESPICLICK) 108 (90 Base) MCG/ACT inhaler     Albuterol (VENTOLIN IN)     alendronate (FOSAMAX) 70 MG tablet     atorvastatin (LIPITOR) 40 MG tablet     Blood Pressure Monitor KIT     Calcium Carbonate-Vit D-Min (CALCIUM 1200 PO)     Cholecalciferol (VITAMIN D) 1000 UNITS capsule     CVS OMEGA-3 KRILL  MG CAPS     Flaxseed, Linseed, POWD     fluocinonide (LIDEX) 0.05 % ointment     fluticasone (FLONASE) 50 MCG/ACT nasal spray     Aylin, Zingiber officinalis, (AYLIN PO)     guaiFENesin-codeine (ROBITUSSIN AC) 100-10 MG/5ML solution     hydrochlorothiazide (HYDRODIURIL) 25 MG tablet     losartan (COZAAR) 50 MG tablet     mometasone furoate (ASMANEX HFA) 200 MCG/ACT inhaler     omeprazole (PRILOSEC) 40 MG DR capsule     Probiotic Product (PROBIOTIC PO)     sertraline (ZOLOFT) 100 MG tablet     sertraline (ZOLOFT) 50 MG tablet     traZODone (DESYREL) 100 MG tablet     triamcinolone (KENALOG) 0.5 % OINT ointment     TURMERIC PO     Current Facility-Administered Medications   Medication     albuterol (PROAIR HFA/PROVENTIL HFA/VENTOLIN HFA) 108 (90 Base) MCG/ACT inhaler 2 puff     Allergies:  Allergies   Allergen Reactions     Penicillins Rash     Shellfish-Derived Products Itching and Rash     Sulfacetamide Rash     Physical Exam:       BP (!) 143/63   Pulse 52   Resp 17   Ht 1.486 m (4' 10.5\")   Wt 55.8 kg (123 lb)   SpO2 98%   BMI 25.27 kg/m       General: Alert, sitting up, NAD  HEENT: anicteric, moist mucosa, no oral lesions  Neck: no palpable lymphadenopathy  Chest: CTAB, no wheezing or crackles  Cardiac: RRR no murmurs  Abdomen: Soft, flat  Extremities: No LE Edema, cyanosis or clubbing  Neuro: A&Ox3, no focal deficits, normal gait   Skin: no rash noted on limited exam  Psych: Bright affect    Laboratory, imaging, and microbiologic data:    All laboratory and imaging data " reviewed, pertinent results discussed above.    CXR 11/13/2019  HISTORY: Cough  COMPARISON: CT 8/9/2019, chest x-ray 12/1/2018     FINDINGS: PA and lateral radiographs of the chest. The trachea is  midline. The cardiac silhouette is within normal limits. No pleural  effusion or pneumothorax. No acute airspace opacities. Visualized  upper abdomen is unremarkable. Multilevel degenerative changes in the  thoracic spine.                                                                   IMPRESSION: No acute airspace opacities.    Most Recent [facility] Pulmonary Function Testing    FVC-Pred   Date Value Ref Range Status   10/18/2019 1.97 L      FVC-Pre   Date Value Ref Range Status   10/18/2019 1.84 L      FVC-%Pred-Pre   Date Value Ref Range Status   10/18/2019 93 %      FEV1-Pre   Date Value Ref Range Status   10/18/2019 1.40 L      FEV1-%Pred-Pre   Date Value Ref Range Status   10/18/2019 90 %      FEV1FVC-Pred   Date Value Ref Range Status   10/18/2019 79 %      FEV1FVC-Pre   Date Value Ref Range Status   10/18/2019 76 %      No results found for: 20029  FEFMax-Pred   Date Value Ref Range Status   10/18/2019 4.26 L/sec      FEFMax-Pre   Date Value Ref Range Status   10/18/2019 4.15 L/sec      FEFMax-%Pred-Pre   Date Value Ref Range Status   10/18/2019 97 %      ExpTime-Pre   Date Value Ref Range Status   10/18/2019 7.30 sec      FIFMax-Pre   Date Value Ref Range Status   10/18/2019 3.19 L/sec      FEV1FEV6-Pred   Date Value Ref Range Status   10/18/2019 78 %      FEV1FEV6-Pre   Date Value Ref Range Status   10/18/2019 75 %      No results found for: 67761    Again, thank you for allowing me to participate in the care of your patient.      Sincerely,    Joelle Marcial MD

## 2019-11-13 NOTE — NURSING NOTE
Chief Complaint   Patient presents with     Consult     Follow up cough/SOB     Medications reviewed and vital signs taken.   Buddy Carlson CMA

## 2019-11-13 NOTE — PATIENT INSTRUCTIONS
Continue taking your omeprazole everyday     Continue taking Asmanex everyday    When you see your PCP next month if cough is better can discuss stopping Asmanex    If cough is worse come back to Pulmonary clinic and follow up in GI clinic to discuss your reflux    If your cough gets significantly worse or you start having shortness of breath let your doctor know    It is important to get the mold out of your house    If doing anything mary can wear a mask, agree with keeping the house and bedroom dust free

## 2019-12-03 DIAGNOSIS — F41.9 ANXIETY: ICD-10-CM

## 2019-12-03 DIAGNOSIS — E78.5 HYPERLIPIDEMIA LDL GOAL <130: ICD-10-CM

## 2019-12-04 ENCOUNTER — TELEPHONE (OUTPATIENT)
Dept: PSYCHIATRY | Facility: CLINIC | Age: 79
End: 2019-12-04

## 2019-12-04 RX ORDER — ATORVASTATIN CALCIUM 40 MG/1
40 TABLET, FILM COATED ORAL DAILY
Qty: 90 TABLET | Refills: 3 | Status: SHIPPED | OUTPATIENT
Start: 2019-12-04 | End: 2020-05-13

## 2019-12-04 RX ORDER — TRAZODONE HYDROCHLORIDE 100 MG/1
100 TABLET ORAL AT BEDTIME
Qty: 30 TABLET | Refills: 0 | Status: SHIPPED | OUTPATIENT
Start: 2019-12-04 | End: 2019-12-18

## 2019-12-04 NOTE — TELEPHONE ENCOUNTER
Writer called pharmacy and cancelled trazodone and sertraline. No further action needed at this time.

## 2019-12-04 NOTE — TELEPHONE ENCOUNTER
Message   Received: Today   Message Contents   Riky Hess APRN CNP Labossiere, Laura, RN             Should we cancel the prescription I just sent?    Previous Messages      ----- Message -----   From: Viviana Gordon RN   Sent: 12/4/2019   2:28 PM CST   To: YAZAN Camarillo CNP     FYI   ----- Message -----   From: Muhumed, Yasmin   Sent: 12/4/2019   2:21 PM CST   To: Mirella Kiran RN, Viviana Gordon RN     Patient stated that she no longer wants to me seen here.   ----- Message -----   From: Mirella Kiran RN   Sent: 12/3/2019  10:23 PM CST   To: Psychiatry Scheduling-[facility]     Please call to schedule.     Thanks     Due for RTC 12/2019     I do not need any follow up on the scheduling of this appointment unless the patient will no longer be receiving care in our clinic.

## 2019-12-04 NOTE — TELEPHONE ENCOUNTER
Writer called pt. No answer. LVM requesting a c/b if she needs refills to bridge the gap to her appt with another psychiatrist.     Tried calling pharmacy, but line was busy. Reminder set to try again in the next 30 mins.

## 2019-12-04 NOTE — TELEPHONE ENCOUNTER
Medication requested:   Sertraline HCl Oral Tablet 50 MG        traZODone HCl Oral Tablet 100 MG  Last refilled:   10/8/19  9/13/19  Qty:   45  90      Last seen: 8/14/19  RTC: 3 months  Cancel: 1  No-show: 0  Next appt: 0    Refill decision:   Refill pended and routed to the provider for review/determination due to   Cancel x 1  Scheduling has been notified to contact the pt for appointment.

## 2019-12-09 ENCOUNTER — OFFICE VISIT (OUTPATIENT)
Dept: FAMILY MEDICINE | Facility: CLINIC | Age: 79
End: 2019-12-09
Payer: COMMERCIAL

## 2019-12-09 VITALS
TEMPERATURE: 97.5 F | WEIGHT: 125.8 LBS | DIASTOLIC BLOOD PRESSURE: 66 MMHG | BODY MASS INDEX: 25.36 KG/M2 | OXYGEN SATURATION: 98 % | HEIGHT: 59 IN | SYSTOLIC BLOOD PRESSURE: 123 MMHG | RESPIRATION RATE: 18 BRPM | HEART RATE: 68 BPM

## 2019-12-09 DIAGNOSIS — F43.23 ADJUSTMENT DISORDER WITH MIXED ANXIETY AND DEPRESSED MOOD: ICD-10-CM

## 2019-12-09 DIAGNOSIS — I10 HTN, GOAL BELOW 150/90: ICD-10-CM

## 2019-12-09 DIAGNOSIS — E87.1 HYPONATREMIA: ICD-10-CM

## 2019-12-09 DIAGNOSIS — K21.9 GASTROESOPHAGEAL REFLUX DISEASE WITHOUT ESOPHAGITIS: ICD-10-CM

## 2019-12-09 DIAGNOSIS — R05.9 COUGH: ICD-10-CM

## 2019-12-09 DIAGNOSIS — G47.00 INSOMNIA, UNSPECIFIED TYPE: ICD-10-CM

## 2019-12-09 DIAGNOSIS — E87.6 HYPOKALEMIA: ICD-10-CM

## 2019-12-09 DIAGNOSIS — I10 HTN, GOAL BELOW 150/90: Primary | ICD-10-CM

## 2019-12-09 DIAGNOSIS — J45.30 MILD PERSISTENT ASTHMA WITHOUT COMPLICATION: ICD-10-CM

## 2019-12-09 LAB
ANION GAP SERPL CALCULATED.3IONS-SCNC: 5 MMOL/L (ref 3–14)
BUN SERPL-MCNC: 16 MG/DL (ref 7–30)
CALCIUM SERPL-MCNC: 9 MG/DL (ref 8.5–10.1)
CHLORIDE SERPL-SCNC: 102 MMOL/L (ref 94–109)
CO2 SERPL-SCNC: 28 MMOL/L (ref 20–32)
CREAT SERPL-MCNC: 0.82 MG/DL (ref 0.52–1.04)
GFR SERPL CREATININE-BSD FRML MDRD: 69 ML/MIN/{1.73_M2}
GLUCOSE SERPL-MCNC: 88 MG/DL (ref 70–99)
POTASSIUM SERPL-SCNC: 3.8 MMOL/L (ref 3.4–5.3)
SODIUM SERPL-SCNC: 135 MMOL/L (ref 133–144)

## 2019-12-09 RX ORDER — LOSARTAN POTASSIUM 100 MG/1
100 TABLET ORAL DAILY
Qty: 90 TABLET | Refills: 3 | Status: SHIPPED | OUTPATIENT
Start: 2019-12-09 | End: 2020-07-13

## 2019-12-09 ASSESSMENT — ANXIETY QUESTIONNAIRES
5. BEING SO RESTLESS THAT IT IS HARD TO SIT STILL: NOT AT ALL
3. WORRYING TOO MUCH ABOUT DIFFERENT THINGS: NOT AT ALL
7. FEELING AFRAID AS IF SOMETHING AWFUL MIGHT HAPPEN: NOT AT ALL
GAD7 TOTAL SCORE: 0
2. NOT BEING ABLE TO STOP OR CONTROL WORRYING: NOT AT ALL
6. BECOMING EASILY ANNOYED OR IRRITABLE: NOT AT ALL
1. FEELING NERVOUS, ANXIOUS, OR ON EDGE: NOT AT ALL

## 2019-12-09 ASSESSMENT — PATIENT HEALTH QUESTIONNAIRE - PHQ9: 5. POOR APPETITE OR OVEREATING: NOT AT ALL

## 2019-12-09 ASSESSMENT — PAIN SCALES - GENERAL: PAINLEVEL: NO PAIN (0)

## 2019-12-09 ASSESSMENT — MIFFLIN-ST. JEOR: SCORE: 953.32

## 2019-12-09 NOTE — NURSING NOTE
Chief Complaint   Patient presents with     Recheck Medication     pt. is here for a follow up. pt. would like test results        Aurelia Rosales, EMT

## 2019-12-09 NOTE — PATIENT INSTRUCTIONS
Primary Care Center Medication Refill Request Information:  * Please contact your pharmacy regarding ANY request for medication refills.  ** [facility] Prescription Fax = [phone]  * Please allow 3 business days for routine medication refills.  * Please allow 5 business days for controlled substance medication refills.     Primary Care Center Test Result notification information:  *You will be notified with in 7-10 days of your appointment day regarding the results of your test.  If you are on MyChart you will be notified as soon as the provider has reviewed the results and signed off on them.

## 2019-12-09 NOTE — PROGRESS NOTES
"[facility]  Primary Care Center   Jama Sanderson MD  12/09/2019      Chief Complaint:   Recheck Medication     History of Present Illness:   Alena Coronel is a 79 year old female with a history of GERD, hypertension, idiopathic thrombocytopenia, osteoporosis, arthritis, chronic cough who presents with her coworker to follow-up on results. She would like her  to see me as well.     Chronic Cough   She reports a 3-4 month long cough. She followed with pulmonology on 11/13/19 for chronic cough, likely associated with GERD. Chest XR on same day was unremarkable. She was recommended to continue on omeprazole and to reduce dust/mold in her home. She wonders the difference between her two inhalers today. She continues to take omeprazole everyday.     Mental Health   She was contacted by psychiatry a week ago, as her provider wanted to see her for follow-up to refill her Zoloft. She refused this and is no longer taking Zoloft as of 5-6 months ago. 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     Other concerns discussed:  1. Arthritis - She reports joint pain in her fingers.   2. Sleep - She takes trazodone at night for sleep, which helps. She wonders if it will \"hurt her organs\" since she has been taking it long-term.   3. BMP - Electrolytes low on 10/17/19. She is taking hydrochlorothiazide 25mg daily.  4. Flu Shot - Already received.       Review of Systems:   Pertinent items are noted in HPI or as in patient entered ROS below, remainder of complete ROS is negative.     Active Medications:      Albuterol (VENTOLIN IN), 2-4 puffs every 4-6 hours as needed.Shannon Dao LPN 3:39 PM on 10/29/2018, Disp: , Rfl:      alendronate (FOSAMAX) 70 MG tablet, Take 1 tablet (70 mg) by mouth every 7 days, Disp: 12 tablet, Rfl: 3     atorvastatin (LIPITOR) 40 MG tablet, Take 1 tablet (40 mg) by mouth daily, Disp: 90 tablet, Rfl: 3     Calcium Carbonate-Vit D-Min (CALCIUM 1200 " PO), Take 1,000 Units by mouth 1 tab daily, Disp: , Rfl:      Cholecalciferol (VITAMIN D) 1000 UNITS capsule, Take 5,000 Units by mouth daily , Disp: , Rfl:      CVS OMEGA-3 KRILL  MG CAPS, Take  by mouth., Disp: , Rfl:      Flaxseed, Linseed, POWD, Take 1-2 tsp by mouth daily., Disp: , Rfl:      fluocinonide (LIDEX) 0.05 % ointment, Apply topically 2 times daily, Disp: , Rfl:      fluticasone (FLONASE) 50 MCG/ACT nasal spray, Spray 1 spray into both nostrils daily (Patient taking differently: Spray 1 spray into both nostrils as needed ), Disp: 1 Bottle, Rfl: 3     Ginger, Zingiber officinalis, (GINGER PO), Take 1 capsule by mouth daily, Disp: , Rfl:      guaiFENesin-codeine (ROBITUSSIN AC) 100-10 MG/5ML solution, Take 5 mLs by mouth every 6 hours as needed for cough, Disp: 118 mL, Rfl: 0     hydrochlorothiazide (HYDRODIURIL) 25 MG tablet, Take 1 tablet (25 mg) by mouth daily, Disp: 90 tablet, Rfl: 0     losartan (COZAAR) 50 MG tablet, Take 2 tablets (100 mg) by mouth daily, Disp: 180 tablet, Rfl: 0     mometasone furoate (ASMANEX HFA) 200 MCG/ACT inhaler, Inhale 2 puffs into the lungs 2 times daily, Disp: 3 Inhaler, Rfl: 3     omeprazole (PRILOSEC) 40 MG DR capsule, TAKE ONE CAPSULE BY MOUTH TWICE A DAY BEFORE MEALS, Disp: 90 capsule, Rfl: 1     Probiotic Product (PROBIOTIC PO), , Disp: , Rfl:      traZODone (DESYREL) 100 MG tablet, Take 1 tablet (100 mg) by mouth At Bedtime For more refills,schedule an appointment at [phone], Disp: 30 tablet, Rfl: 0     triamcinolone (KENALOG) 0.5 % OINT ointment, Apply to affected area twice a day, when rash is active, Disp: 30 g, Rfl: 0     TURMERIC PO, , Disp: , Rfl:      Allergies:   Penicillins; Shellfish-derived products; and Sulfacetamide      Past Medical History:  Adjustment disorder   Anxiety  Chronic cough  Chronic neck pain  Chronic pruitus   Dysphagia  Gastroesophageal reflux disease  Hypertension   Hypokalemia  Insomnia  Intestinal disaccharidase  "deficiencies and disaccharide malabsorption  Osteoarthritis  Palpitations  Urinary incontinence   Irritable bowel syndrome   Eczema  Idiopathic thrombocytopenia  Immune to hepatitis A  Macular drusen   Posterior vitreous detachment   Venous stasis  Temporomandibular joint syndrome  Lumbago     Past Surgical History:  Biopsy of breast, incisional -   Appendectomy -   Ligate fallopian tube -     Family History:   Stomach/liver problems - father  Mental illness - sister (schizophrenia, paranoid), brother  Visual loss - sister  Mother  during childbirth  Father  in [place], presented as bad headache       Social History:   The patient was accompanied to the appointment by: her coworker   Smoking Status: Never smoker    Smokeless Tobacco: Never used   Alcohol Use: Yes     Physical Exam:   /66 (BP Location: Right arm, Patient Position: Chair, Cuff Size: Adult Regular)   Pulse 68   Temp 97.5  F (36.4  C) (Oral)   Resp 18   Ht 1.486 m (4' 10.5\")   Wt 57.1 kg (125 lb 12.8 oz)   SpO2 98%   Breastfeeding No   BMI 25.84 kg/m       Constitutional: Alert. In no distress.  Head: The scalp, face, and head appear normal.  Musculoskeletal: Extremities appear normal. No gross deformities noted.   Neurologic: Gait normal. Speech is normal and fluent.  Psychiatric: Mentation appears normal. Normal affect.       Assessment and Plan:  HTN, goal below 150/90  - losartan (COZAAR) 100 MG tablet  Dispense: 90 tablet; Refill: 3  - Basic metabolic panel     Low Electrolytes   Likely from her hydrochlorothiazide. Normal on recheck today. Will check from time to time.     Doing well off Zoloft. No mood complaints. She does take trazodone for sleeplessness and this helps.      Follow-up: In 1-2 months.          Scribe Disclosure:  Aimee WHITTINGTON, am serving as a scribe to document services personally performed by Jama Sanderson MD at this visit, based upon the provider's statements to me. All documentation " has been reviewed by the aforementioned provider prior to being entered into the official medical record.     Portions of this medical record were completed by a scribe. UPON MY REVIEW AND AUTHENTICATION BY ELECTRONIC SIGNATURE, this confirms (a) I performed the applicable clinical services, and (b) the record is accurate.   Jama Sanderson MD MD

## 2019-12-10 LAB
GAMMA INTERFERON BACKGROUND BLD IA-ACNC: 0.04 IU/ML
M TB IFN-G BLD-IMP: NEGATIVE
M TB IFN-G CD4+ BCKGRND COR BLD-ACNC: 7.1 IU/ML
MITOGEN IGNF BCKGRD COR BLD-ACNC: 0 IU/ML
MITOGEN IGNF BCKGRD COR BLD-ACNC: 0 IU/ML

## 2019-12-10 ASSESSMENT — ANXIETY QUESTIONNAIRES: GAD7 TOTAL SCORE: 0

## 2019-12-16 DIAGNOSIS — F41.9 ANXIETY: ICD-10-CM

## 2019-12-17 DIAGNOSIS — F41.9 ANXIETY: ICD-10-CM

## 2019-12-17 NOTE — TELEPHONE ENCOUNTER
Health Call Center    Phone Message    May a detailed message be left on voicemail: yes    Reason for Call: Medication Question or concern regarding medication   Prescription Clarification  Name of Medication: traZODone (DESYREL) 100 MG tablet  Prescribing Provider: Riky Hess   Pharmacy:    What on the order needs clarification? Please call the patient as she wants to discuss Dr Sanderson taking over management of the medication.          Action Taken: Message routed to:  [facility]

## 2019-12-18 RX ORDER — TRAZODONE HYDROCHLORIDE 100 MG/1
100 TABLET ORAL AT BEDTIME
Qty: 90 TABLET | Refills: 0 | OUTPATIENT
Start: 2019-12-18

## 2019-12-18 RX ORDER — TRAZODONE HYDROCHLORIDE 100 MG/1
100 TABLET ORAL AT BEDTIME
Qty: 90 TABLET | Refills: 0 | Status: SHIPPED | OUTPATIENT
Start: 2019-12-18 | End: 2020-02-27

## 2020-01-23 DIAGNOSIS — I10 ESSENTIAL HYPERTENSION: ICD-10-CM

## 2020-01-23 RX ORDER — HYDROCHLOROTHIAZIDE 25 MG/1
25 TABLET ORAL DAILY
Qty: 90 TABLET | Refills: 3 | Status: SHIPPED | OUTPATIENT
Start: 2020-01-23

## 2020-01-23 NOTE — TELEPHONE ENCOUNTER
" Centralized Medication Refill Team note:  Fosamax refilled today/ #12/ 3 refills and sent to pharmacy by provider.      hydrochlorothiazide (HYDRODIURIL) 25 MG tablet  Last Written Prescription Date:  7/9/2019  Last Fill Quantity: 90,   # refills: 0  Last Office Visit : 12/9/2019  Future Office visit:  2/13/20 12/09/19 123/66   11/13/19 (!) 143/63   10/17/19 (!) 170/84      12/9/2019 Dr Sanderson note\"HTN, goal below 150/90  - losartan (COZAAR) 100 MG tablet  Dispense: 90 tablet; Refill: 3  - Basic metabolic panel     Low Electrolytes   Likely from her hydrochlorothiazide. Normal on recheck today. Will check from time to time.\"   Refilled x 1 year per protocol.  MyChart to patient:\"Your refills have been approved and sent to your pharmacy.\"        "

## 2020-01-23 NOTE — TELEPHONE ENCOUNTER
Health Call Center    Phone Message    May a detailed message be left on voicemail: no    Reason for Call: Medication Refill Request    Has the patient contacted the pharmacy for the refill? Yes   Name of medication being requested: alendronate (FOSAMAX) 70 MG tablet  hydrochlorothiazide (HYDRODIURIL) 25 MG tablet  Provider who prescribed the medication: Dr. Luna  Pharmacy: [facility]  Date medication is needed: ASAP, Pt is OUT    Action Taken: Message routed to:  [facility] MED REFILL TEAM

## 2020-01-24 DIAGNOSIS — M81.0 OSTEOPOROSIS: ICD-10-CM

## 2020-01-24 NOTE — TELEPHONE ENCOUNTER
Health Call Center    Phone Message    May a detailed message be left on voicemail: yes    Reason for Call: Medication Refill Request    Has the patient contacted the pharmacy for the refill? Yes   Name of medication being requested: alendronate (FOSAMAX) 70 MG tablet  Provider who prescribed the medication:   Pharmacy:  [facility] [id number] [place] [facility] [address] [place] [address]  Date medication is needed: asap         Action Taken: Message routed to:  [facility]): [facility] med refill team

## 2020-01-24 NOTE — TELEPHONE ENCOUNTER
"MYCHART sent 1/23/20:\"Your refills( FOSAMAX, HYDRODIURIL) have been approved and sent to your pharmacy.[facility] [id number] [address] [facility] - [place], [place] - [address]\"      alendronate (FOSAMAX) 70 MG tablet 12 tablet 3 1/23/2019 1/23/2020 --   Sig - Route: Take 1 tablet (70 mg) by mouth every 7 days - Oral   Sent to pharmacy as: alendronate (FOSAMAX) 70 MG tablet     Spoke to patient and she understands.  "

## 2020-01-27 RX ORDER — ALENDRONATE SODIUM 70 MG/1
70 TABLET ORAL
Qty: 12 TABLET | Refills: 3 | Status: SHIPPED | OUTPATIENT
Start: 2020-01-27

## 2020-01-27 NOTE — TELEPHONE ENCOUNTER
alendronate (FOSAMAX) 70 MG tablet  Last Written Prescription Date:  1/23/19  Last Fill Quantity: 12   # refills: 3  Last Office Visit : 12/9/19  Future Office visit: 2/13/20

## 2020-01-27 NOTE — TELEPHONE ENCOUNTER
Health Call Center    Phone Message    May a detailed message be left on voicemail: yes    Reason for Call: Medication Question or concern regarding medication   Prescription Clarification  Name of Medication: alendronate (FOSAMAX) 70 MG tablet  Prescribing Provider: Jama Sanderson MD    Pharmacy: [facility] [id number] [place], [place] - [address] [place] [address]   What on the order needs clarification?   The patient said the pharmacy didn't get this medication order, I called the pharmacy just to confirm and they did inform me they didn't get the order. It was suppose to be sent last week on 1/23/20 please resend order to patients pharmacy and call patient with updates thank you.          Action Taken: Message routed to:  [facility]

## 2020-01-30 DIAGNOSIS — K21.9 GASTROESOPHAGEAL REFLUX DISEASE WITHOUT ESOPHAGITIS: ICD-10-CM

## 2020-01-30 RX ORDER — OMEPRAZOLE 40 MG/1
CAPSULE, DELAYED RELEASE ORAL
Qty: 180 CAPSULE | Refills: 1 | Status: SHIPPED | OUTPATIENT
Start: 2020-01-30 | End: 2020-07-03

## 2020-01-30 NOTE — TELEPHONE ENCOUNTER
omeprazole (PRILOSEC) 40 MG DR capsule    Last Written Prescription Date: 10/17/19  Last Fill Quantity: 90,   # refills: 1  Last Office Visit : 12/9/19  Future Office visit:  2/13/20    Routed because: osteoporosis  #qty, # rfs?

## 2020-01-30 NOTE — TELEPHONE ENCOUNTER
Health Call Center    Phone Message    May a detailed message be left on voicemail: yes    Reason for Call: Medication Refill Request    Has the patient contacted the pharmacy for the refill? Yes   Name of medication being requested: omeprazole (PRILOSEC) 40 MG DR capsule    Provider who prescribed the medication: Dr. Luna   Pharmacy: [facility] [id number] [place] [facility] [address] [place] [address]    Date medication is needed: asap         Action Taken: Message routed to:  [facility] JOSE ALEJANDRO

## 2020-02-13 ENCOUNTER — OFFICE VISIT (OUTPATIENT)
Dept: FAMILY MEDICINE | Facility: CLINIC | Age: 80
End: 2020-02-13
Payer: COMMERCIAL

## 2020-02-13 VITALS
OXYGEN SATURATION: 98 % | HEART RATE: 58 BPM | SYSTOLIC BLOOD PRESSURE: 139 MMHG | WEIGHT: 126.8 LBS | TEMPERATURE: 97.3 F | HEIGHT: 57 IN | BODY MASS INDEX: 27.36 KG/M2 | DIASTOLIC BLOOD PRESSURE: 80 MMHG

## 2020-02-13 DIAGNOSIS — R35.0 URINE FREQUENCY: ICD-10-CM

## 2020-02-13 DIAGNOSIS — R35.1 NOCTURIA: Primary | ICD-10-CM

## 2020-02-13 LAB
ALBUMIN UR-MCNC: NEGATIVE MG/DL
APPEARANCE UR: CLEAR
BILIRUB UR QL STRIP: NEGATIVE
COLOR UR AUTO: COLORLESS
GLUCOSE UR STRIP-MCNC: NEGATIVE MG/DL
HGB UR QL STRIP: NEGATIVE
KETONES UR STRIP-MCNC: NEGATIVE MG/DL
LEUKOCYTE ESTERASE UR QL STRIP: NEGATIVE
MUCOUS THREADS #/AREA URNS LPF: PRESENT /LPF
NITRATE UR QL: NEGATIVE
PH UR STRIP: 8 PH (ref 5–7)
RBC #/AREA URNS AUTO: 1 /HPF (ref 0–2)
SOURCE: ABNORMAL
SP GR UR STRIP: 1 (ref 1–1.03)
SQUAMOUS #/AREA URNS AUTO: <1 /HPF (ref 0–1)
UROBILINOGEN UR STRIP-MCNC: 0 MG/DL (ref 0–2)
WBC #/AREA URNS AUTO: 0 /HPF (ref 0–5)

## 2020-02-13 RX ORDER — TOLTERODINE TARTRATE 1 MG/1
1 TABLET, EXTENDED RELEASE ORAL 2 TIMES DAILY
Qty: 60 TABLET | Refills: 1 | Status: SHIPPED | OUTPATIENT
Start: 2020-02-13

## 2020-02-13 ASSESSMENT — MIFFLIN-ST. JEOR: SCORE: 934.04

## 2020-02-13 ASSESSMENT — PAIN SCALES - GENERAL: PAINLEVEL: NO PAIN (0)

## 2020-02-13 NOTE — PATIENT INSTRUCTIONS
[facility] Center Medication Refill Request Information:  * Please contact your pharmacy regarding ANY request for medication refills.  ** [facility] Prescription Fax = [phone]  * Please allow 3 business days for routine medication refills.  * Please allow 5 business days for controlled substance medication refills.     [facility] Test Result notification information:  *You will be notified with in 7-10 days of your appointment day regarding the results of your test.  If you are on MyChart you will be notified as soon as the provider has reviewed the results and signed off on them.

## 2020-02-13 NOTE — PROGRESS NOTES
[facility]  Primary Care Center   Jama Sanderson MD  02/13/2020      Chief Complaint:   Recheck Medication, Urinary Problem     History of Present Illness:   The patient's clinic visit was completed with the assistance of a Romansh .   Alena Coronel is a 79 year old female with a history of HTN and ITP who presents for evaluation of urinary frequency.  The patient reports she is having to get up 3 - 4 times a night to urinate.  She has to urinate more often during the day as well and has a sense of urgency when she needs to go.  She denies any pain with urination or incontinence.  She does feel that she empties her bladder fully when she does go.  This has been an ongoing issue for a long time but over this is getting worse.  She has not had frequent urinary tract infections.  She denies significant caffeine intake but does drink a lot of water throughout the day, until about 6 or 7 pm.  She does tend to be constipated.  If she does not take Miralax and Metamucil daily, she has less frequent stools and has to strain to pass stool.  With these medications she has daily bowel movements and does not have to strain.     Review of Systems:   Pertinent items are noted in HPI or as in patient entered ROS below, remainder of complete ROS is negative.     Active Medications:      albuterol (PROAIR RESPICLICK) 108 (90 Base) MCG/ACT inhaler, Inhale 2 puffs into the lungs every 6 hours as needed for shortness of breath / dyspnea or wheezing, Disp: 1 Inhaler, Rfl: 1     Albuterol (VENTOLIN IN), 2-4 puffs every 4-6 hours as needed.Shannon Dao LPN 3:39 PM on 10/29/2018, Disp: , Rfl:      alendronate (FOSAMAX) 70 MG tablet, Take 1 tablet (70 mg) by mouth every 7 days, Disp: 12 tablet, Rfl: 3     atorvastatin (LIPITOR) 40 MG tablet, Take 1 tablet (40 mg) by mouth daily, Disp: 90 tablet, Rfl: 3     Calcium Carbonate-Vit D-Min (CALCIUM 1200 PO), Take 1,000 Units by mouth 1 tab daily, Disp: , Rfl:      Cholecalciferol  (VITAMIN D) 1000 UNITS capsule, Take 5,000 Units by mouth daily , Disp: , Rfl:      [employer] OMEGA-3 KRILL  MG CAPS, Take  by mouth., Disp: , Rfl:      Flaxseed, Linseed, POWD, Take 1-2 tsp by mouth daily., Disp: , Rfl:      fluocinonide (LIDEX) 0.05 % ointment, Apply topically 2 times daily, Disp: , Rfl:      fluticasone (FLONASE) 50 MCG/ACT nasal spray, Spray 1 spray into both nostrils daily (Patient taking differently: Spray 1 spray into both nostrils as needed ), Disp: 1 Bottle, Rfl: 3     Ginger, Zingiber officinalis, (GINGER PO), Take 1 capsule by mouth daily, Disp: , Rfl:      guaiFENesin-codeine (ROBITUSSIN AC) 100-10 MG/5ML solution, Take 5 mLs by mouth every 6 hours as needed for cough, Disp: 118 mL, Rfl: 0     hydrochlorothiazide (HYDRODIURIL) 25 MG tablet, Take 1 tablet (25 mg) by mouth daily, Disp: 90 tablet, Rfl: 3     losartan (COZAAR) 100 MG tablet, Take 1 tablet (100 mg) by mouth daily, Disp: 90 tablet, Rfl: 3     losartan (COZAAR) 50 MG tablet, Take 2 tablets (100 mg) by mouth daily, Disp: 180 tablet, Rfl: 0     mometasone furoate (ASMANEX HFA) 200 MCG/ACT inhaler, Inhale 2 puffs into the lungs 2 times daily, Disp: 3 Inhaler, Rfl: 3     omeprazole (PRILOSEC) 40 MG DR capsule, TAKE ONE CAPSULE BY MOUTH TWICE A DAY BEFORE MEALS, Disp: 180 capsule, Rfl: 1     Probiotic Product (PROBIOTIC PO), , Disp: , Rfl:      sertraline (ZOLOFT) 100 MG tablet, , Disp: , Rfl: 0     sertraline (ZOLOFT) 50 MG tablet, Take 1.5 tablets (75 mg) by mouth daily For more refills,schedule an appointment at [phone], Disp: 45 tablet, Rfl: 0     traZODone (DESYREL) 100 MG tablet, Take 1 tablet (100 mg) by mouth At Bedtime For more refills,schedule an appointment at [phone], Disp: 90 tablet, Rfl: 0     triamcinolone (KENALOG) 0.5 % OINT ointment, Apply to affected area twice a day, when rash is active, Disp: 30 g, Rfl: 0     TURMERIC PO, , Disp: , Rfl:      Allergies:   Penicillins; Shellfish-derived products; and  "Sulfacetamide      Past Medical History:  Hypertension  Hyperlipidemia   Osteoporosis   Osteoarthritis  Idiopathic thrombocytopenic purpura    Irritable bowel syndrome   Gastroesophageal reflux disease   Intestinal disaccharidase deficiencies  Macular drusen  Posterior vitreous detachment   Venous stasis  TMJ syndrome  Urinary incontinence  Anxiety   Adjustment disorder   Insomnia   Chronic pruritis      Past Surgical History:  Breast biopsy   Appendectomy   Bilateral tubal ligation     Family History:   GI disease - father  Mental illness - sister, brother   Diabetes - sister      Social History:   Presents to clinic with an .  Tobacco Use: No previous or current tobacco use.   Alcohol Use: several glasses of wine per week     Physical Exam:   /80 (BP Location: Right arm, Patient Position: Chair, Cuff Size: Adult Regular)   Pulse 58   Temp 97.3  F (36.3  C) (Oral)   Ht 1.448 m (4' 9\")   Wt 57.5 kg (126 lb 12.8 oz)   SpO2 98%   Breastfeeding No   BMI 27.44 kg/m       Constitutional: Alert. In no distress.  Head: Normocephalic.   ENT: Mucous membranes are moist.   Respiratory: Normal rate and effort.  Musculoskeletal: No gross deformities noted.   Psychiatric: Mentation appears normal. Normal affect.        Assessment and Plan:  Nocturia  Urine frequency  If urine is normal, she would like to try overactive bladder meds before seeing urology however could do that if not improving  - UA with Micro reflex to Culture  - tolterodine (DETROL) 1 MG tablet  Dispense: 60 tablet; Refill: 1        Scribe Disclosure:  I, Dia Lantigua, am serving as a scribe to document services personally performed by Jama Sanderson MD at this visit, based upon the provider's statements to me. All documentation has been reviewed by the aforementioned provider prior to being entered into the official medical record.     Portions of this medical record were completed by a scribe. UPON MY REVIEW AND AUTHENTICATION BY " ELECTRONIC SIGNATURE, this confirms (a) I performed the applicable clinical services, and (b) the record is accurate.   Jama Sanderson MD MD

## 2020-02-22 ENCOUNTER — OFFICE VISIT (OUTPATIENT)
Dept: FAMILY MEDICINE | Facility: CLINIC | Age: 80
End: 2020-02-22
Payer: COMMERCIAL

## 2020-02-22 VITALS
SYSTOLIC BLOOD PRESSURE: 145 MMHG | DIASTOLIC BLOOD PRESSURE: 75 MMHG | OXYGEN SATURATION: 99 % | HEART RATE: 64 BPM | BODY MASS INDEX: 27.27 KG/M2 | WEIGHT: 126 LBS

## 2020-02-22 DIAGNOSIS — J10.1 INFLUENZA A: ICD-10-CM

## 2020-02-22 DIAGNOSIS — R05.9 COUGH: Primary | ICD-10-CM

## 2020-02-22 LAB
FLUAV+FLUBV AG SPEC QL: NEGATIVE
FLUAV+FLUBV AG SPEC QL: POSITIVE
SPECIMEN SOURCE: ABNORMAL

## 2020-02-22 RX ORDER — OSELTAMIVIR PHOSPHATE 75 MG/1
75 CAPSULE ORAL 2 TIMES DAILY
Qty: 10 CAPSULE | Refills: 0 | Status: SHIPPED | OUTPATIENT
Start: 2020-02-22 | End: 2020-02-27

## 2020-02-22 ASSESSMENT — PAIN SCALES - GENERAL: PAINLEVEL: NO PAIN (1)

## 2020-02-22 NOTE — PROGRESS NOTES
[facility]  Primary Care Center   Jama Sanderson MD  02/22/2020      Chief Complaint:   URI       History of Present Illness:   Alena Coronel is a 79 year old female with a history of HTN and ITP who presents for evaluation of URI.    The patient reports having flu-like symptoms that started last night. Symptoms include fever, runny nose, coughing, nausea, and vomiting after eating. She denies having sore throat, earaches, diarrhea, and phlegm production. She noticed that she started coughing on Thursday (2/19/20). She started taking Mucinex last night but it has not improved her symptoms. Notably, her  got sick before her and is currently in the ER.  She has never had pneumonia and does not smoke. She does use an inhaler for asthma.     Review of Systems:   Pertinent items are noted in HPI or as in patient entered ROS below, remainder of complete ROS is negative.     Active Medications:      albuterol (PROAIR RESPICLICK) 108 (90 Base) MCG/ACT inhaler, Inhale 2 puffs into the lungs every 6 hours as needed for shortness of breath / dyspnea or wheezing, Disp: 1 Inhaler, Rfl: 1     Albuterol (VENTOLIN IN), 2-4 puffs every 4-6 hours as needed.Shannon Dao LPN 3:39 PM on 10/29/2018, Disp: , Rfl:      alendronate (FOSAMAX) 70 MG tablet, Take 1 tablet (70 mg) by mouth every 7 days, Disp: 12 tablet, Rfl: 3     atorvastatin (LIPITOR) 40 MG tablet, Take 1 tablet (40 mg) by mouth daily, Disp: 90 tablet, Rfl: 3     Blood Pressure Monitor KIT, 1 Device daily, Disp: 1 kit, Rfl: 1     Calcium Carbonate-Vit D-Min (CALCIUM 1200 PO), Take 1,000 Units by mouth 1 tab daily, Disp: , Rfl:      Cholecalciferol (VITAMIN D) 1000 UNITS capsule, Take 5,000 Units by mouth daily , Disp: , Rfl:      CVS OMEGA-3 KRILL  MG CAPS, Take  by mouth., Disp: , Rfl:      Flaxseed, Linseed, POWD, Take 1-2 tsp by mouth daily., Disp: , Rfl:      fluocinonide (LIDEX) 0.05 % ointment, Apply topically 2 times daily, Disp: , Rfl:       fluticasone (FLONASE) 50 MCG/ACT nasal spray, Spray 1 spray into both nostrils daily (Patient taking differently: Spray 1 spray into both nostrils as needed ), Disp: 1 Bottle, Rfl: 3     Ginger, Zingiber officinalis, (GINGER PO), Take 1 capsule by mouth daily, Disp: , Rfl:      guaiFENesin-codeine (ROBITUSSIN AC) 100-10 MG/5ML solution, Take 5 mLs by mouth every 6 hours as needed for cough, Disp: 118 mL, Rfl: 0     hydrochlorothiazide (HYDRODIURIL) 25 MG tablet, Take 1 tablet (25 mg) by mouth daily, Disp: 90 tablet, Rfl: 3     losartan (COZAAR) 100 MG tablet, Take 1 tablet (100 mg) by mouth daily, Disp: 90 tablet, Rfl: 3     losartan (COZAAR) 50 MG tablet, Take 2 tablets (100 mg) by mouth daily, Disp: 180 tablet, Rfl: 0     mometasone furoate (ASMANEX HFA) 200 MCG/ACT inhaler, Inhale 2 puffs into the lungs 2 times daily, Disp: 3 Inhaler, Rfl: 3     omeprazole (PRILOSEC) 40 MG DR capsule, TAKE ONE CAPSULE BY MOUTH TWICE A DAY BEFORE MEALS, Disp: 180 capsule, Rfl: 1     Probiotic Product (PROBIOTIC PO), , Disp: , Rfl:      sertraline (ZOLOFT) 100 MG tablet, , Disp: , Rfl: 0     sertraline (ZOLOFT) 50 MG tablet, Take 1.5 tablets (75 mg) by mouth daily For more refills,schedule an appointment at [phone], Disp: 45 tablet, Rfl: 0     tolterodine (DETROL) 1 MG tablet, Take 1 tablet (1 mg) by mouth 2 times daily, Disp: 60 tablet, Rfl: 1     traZODone (DESYREL) 100 MG tablet, Take 1 tablet (100 mg) by mouth At Bedtime For more refills,schedule an appointment at [phone], Disp: 90 tablet, Rfl: 0     triamcinolone (KENALOG) 0.5 % OINT ointment, Apply to affected area twice a day, when rash is active, Disp: 30 g, Rfl: 0     TURMERIC PO, , Disp: , Rfl:      albuterol (PROAIR HFA/PROVENTIL HFA/VENTOLIN HFA) 108 (90 Base) MCG/ACT inhaler 2 puff, 2 puff, Inhalation, Once, Ling Luna MD      Allergies:   Penicillins; Shellfish-derived products; and Sulfacetamide      Past Medical History:  Adjustment disorder with  mixed anxiety and depressed mood  Anxiety  Chronic cough  Chronic neck pain  Chronic pruritis   Dysphagia  GERD  Hyperlipidemia  Hypertension  Hypokalemia  Insomnia  Intestinal disaccharide deficiencies and malabsorption  Localized osteoarthrosis in hand  Low back pain with bilateral sciatica  Osteoporosis  Palpitations  Urinary incontinence  IBS  Eczema  Idiopathic thrombocytopenia  Immune to hepatitis A  Macular drusen  PVD  Venous stasis  Cataract  TMJ  Urinary incontinence  Lumbago  Disorder of bone and cartilage     Past Surgical History:  Biopsy of breast, incisional  C appendectomy  C ligate fallopian tube  Colonoscopy    Family History:   Gastrointestinal disease - father  Bad headache - sister, passed in [place]  Mental illness (schizoprenia, paranoid) - sister, brother  Vision loss - sister  Diabetes - sister      Social History:   This patient presented alone.  Smoking status: never smoked  Smokeless tobacco: never used  Alcohol use: yes, 1-2 glasses of wine every week, 2-4 times per month  Drug use: no     Physical Exam:   BP (!) 145/75   Pulse 64   Wt 57.2 kg (126 lb)   SpO2 99%   BMI 27.27 kg/m     Constitutional: Alert. In no distress.  Head: Normocephalic.   ENT: No neck nodes or sinus tenderness.  Cardiovascular: RRR. No murmurs, clicks, gallops, or rub.  Respiratory: Clear to auscultation bilaterally, no wheezes or crackles.  Musculoskeletal: No gross deformities noted.   Psychiatric: Mentation appears normal. Normal affect.       Assessment and Plan:  Cough  Influenza A  She has the flu. She will take Tamiflu. Use conservative and supportive measures and follow-up PRN worse.  - Influenza A/B antigen - Rapid Nasal Swab, Clinic Collect  - oseltamivir (TAMIFLU) 75 MG capsule  Dispense: 10 capsule; Refill: 0      Scribe Disclosure:  Patsy WHITTINGTON and Marcela Arndt am serving as a scribe to document services personally performed by Jama Sanderson MD at this visit, based upon the provider's  statements to me. All documentation has been reviewed by the aforementioned provider prior to being entered into the official medical record.    Portions of this medical record were completed by a scribe. UPON MY REVIEW AND AUTHENTICATION BY ELECTRONIC SIGNATURE, this confirms (a) I performed the applicable clinical services, and (b) the record is accurate.   Jama Sanderson MD MD

## 2020-02-22 NOTE — NURSING NOTE
Chief Complaint   Patient presents with     URI     pt having dry cough and cold symptoms for 1 day. Pt reports throwing up last night     Kimberly Nissen, EMT at 9:19 AM on 2/22/2020

## 2020-02-24 ENCOUNTER — TELEPHONE (OUTPATIENT)
Dept: FAMILY MEDICINE | Facility: CLINIC | Age: 80
End: 2020-02-24

## 2020-02-24 NOTE — TELEPHONE ENCOUNTER
----- Message from Jama Sanderson MD sent at 2/24/2020  2:11 PM CST -----  Please call her, make sure knows flu A positive, if did not get Tamiflu get and start it

## 2020-02-24 NOTE — TELEPHONE ENCOUNTER
Spoke to pharmacy and the patient did  RX. [facility] message sent to patient. Lucie Florence LPN 2/24/2020 4:18 PM

## 2020-02-27 ENCOUNTER — TELEPHONE (OUTPATIENT)
Dept: FAMILY MEDICINE | Facility: CLINIC | Age: 80
End: 2020-02-27

## 2020-02-27 DIAGNOSIS — F41.9 ANXIETY: ICD-10-CM

## 2020-02-27 RX ORDER — TRAZODONE HYDROCHLORIDE 100 MG/1
100 TABLET ORAL AT BEDTIME
Qty: 90 TABLET | Refills: 3 | Status: SHIPPED | OUTPATIENT
Start: 2020-02-27

## 2020-02-27 NOTE — TELEPHONE ENCOUNTER
[facility] Call Center    Phone Message    May a detailed message be left on voicemail: yes     Reason for Call: Medication Refill Request    Has the patient contacted the pharmacy for the refill? Yes   Name of medication being requested: traZODone (DESYREL) 100 MG tablet  Provider who prescribed the medication: Jama Sanderson  Pharmacy:    [id number] - [place], [place] - [address]    Date medication is needed: patient will be out in two weeks         Action Taken: Message routed to:  [facility] med refill    Travel Screening: Not Applicable

## 2020-02-27 NOTE — TELEPHONE ENCOUNTER
Spoke to patient who states that she is feeling much better, but that she is still tired. Patient did take the Tamiflu that was prescribed and it was completed today. Advised patient to rest and drink plenty of fluids and that it take time to recover and get back to normal.      Patient is asking for a refill on her Trazodone, as she is going to be out in about 2 weeks she thinks. New encounter for Rx refill was sent. Lucie Florence LPN 2/27/2020 11:50 AM

## 2020-02-27 NOTE — TELEPHONE ENCOUNTER
traZODone (DESYREL) 100 MG tablet  Last Written Prescription Date:  12/18/19  Last Fill Quantity: 90   # refills: 0  Last Office Visit :2/22/20  Future Office visit: none

## 2020-02-27 NOTE — TELEPHONE ENCOUNTER
[facility] Call Center    Phone Message    May a detailed message be left on voicemail: yes     Reason for Call: Symptoms or Concerns     If patient has red-flag symptoms, warm transfer to triage line    Current symptom or concern: Patient called to report that her symptoms have not subsided and is wondering if there are any alternatives she should try medication wise or suggestions from the clinic.     Please call her back to discuss    Symptoms have been present for:  several day(s)    Has patient previously been seen for this? Yes    By : Jama Sanderson    Date: 2/22/20    Are there any new or worsening symptoms? No      Action Taken: Message routed to:  [facility]    Travel Screening: Not Applicable

## 2020-02-27 NOTE — TELEPHONE ENCOUNTER
Patient asking for a refill on her Trazodone, as she states she will be out in about 2 weeks. Lucie Florence LPN 2/27/2020 11:52 AM

## 2020-03-18 DIAGNOSIS — M81.0 AGE-RELATED OSTEOPOROSIS WITHOUT CURRENT PATHOLOGICAL FRACTURE: Primary | ICD-10-CM

## 2020-03-18 RX ORDER — ALENDRONATE SODIUM 70 MG/1
70 TABLET ORAL
Qty: 12 TABLET | Refills: 0 | Status: SHIPPED | OUTPATIENT
Start: 2020-03-18 | End: 2020-03-26

## 2020-03-18 NOTE — TELEPHONE ENCOUNTER
Pt requesting RX for Alendronate 70mg once a week. Last Dexa scan 2018.  Arin Turner RN 10:40 AM on 3/18/2020.

## 2020-03-22 ENCOUNTER — HEALTH MAINTENANCE LETTER (OUTPATIENT)
Age: 80
End: 2020-03-22

## 2020-03-26 DIAGNOSIS — M81.0 AGE-RELATED OSTEOPOROSIS WITHOUT CURRENT PATHOLOGICAL FRACTURE: ICD-10-CM

## 2020-03-26 RX ORDER — ALENDRONATE SODIUM 70 MG/1
70 TABLET ORAL
Qty: 12 TABLET | Refills: 0 | Status: SHIPPED | OUTPATIENT
Start: 2020-03-26 | End: 2020-07-03

## 2020-03-26 NOTE — TELEPHONE ENCOUNTER
alendronate (FOSAMAX) 70 MG tablet     Last Written Prescription Date:  3/18/2020  Last Fill Quantity: 12,   # refills: 0  Last Office Visit : 2/22/2020  Future Office visit:  None    Routing refill request to provider for review/approval because:  Only 12 Tabs ordered.    Continue same med & dose??  Refer to clinic for review    Celi Doe RN  Central Triage Red Flags/Med Refills

## 2020-03-26 NOTE — TELEPHONE ENCOUNTER
alendronate : 3 month supply (12 tabs) was sent to [facility] pharmacy on 3/18/20, but it needs to send it to a different pharmacy ([facility]).

## 2020-05-13 ENCOUNTER — TELEPHONE (OUTPATIENT)
Dept: FAMILY MEDICINE | Facility: CLINIC | Age: 80
End: 2020-05-13

## 2020-05-13 DIAGNOSIS — E78.5 HYPERLIPIDEMIA LDL GOAL <130: ICD-10-CM

## 2020-05-13 RX ORDER — ATORVASTATIN CALCIUM 40 MG/1
40 TABLET, FILM COATED ORAL DAILY
Qty: 90 TABLET | Refills: 3 | Status: SHIPPED | OUTPATIENT
Start: 2020-05-13

## 2020-05-13 NOTE — TELEPHONE ENCOUNTER
[facility] Call Center    Phone Message    May a detailed message be left on voicemail: yes     Reason for Call: Medication Refill Request    Has the patient contacted the pharmacy for the refill? Yes   Name of medication being requested: atorvastatin (LIPITOR) 40 MG tablet   Provider who prescribed the medication: Dr. Sanderson  [facility] [place] [place] [address]    Date medication is needed: 5/14/2020         Action Taken: Message routed to:  [facility]    Travel Screening: Not Applicable

## 2020-06-30 DIAGNOSIS — K21.9 GASTROESOPHAGEAL REFLUX DISEASE WITHOUT ESOPHAGITIS: ICD-10-CM

## 2020-06-30 DIAGNOSIS — M81.0 AGE-RELATED OSTEOPOROSIS WITHOUT CURRENT PATHOLOGICAL FRACTURE: ICD-10-CM

## 2020-07-03 RX ORDER — OMEPRAZOLE 40 MG/1
CAPSULE, DELAYED RELEASE ORAL
Qty: 180 CAPSULE | Refills: 3 | Status: SHIPPED | OUTPATIENT
Start: 2020-07-03

## 2020-07-03 RX ORDER — ALENDRONATE SODIUM 70 MG/1
70 TABLET ORAL
Qty: 13 TABLET | Refills: 1 | Status: SHIPPED | OUTPATIENT
Start: 2020-07-03

## 2020-07-03 NOTE — TELEPHONE ENCOUNTER
omeprazole (PRILOSEC) 40 MG DR capsule       Last Written Prescription Date:  1/30/20  Last Fill Quantity: 180,   # refills: 1  Last Office Visit : 2/22/20  Future Office visit:  None scheduled    Routing refill request to provider for review/approval because:  Failed protocol as has diagnosis of osteoporsis on problem list

## 2020-07-13 DIAGNOSIS — I10 BENIGN ESSENTIAL HYPERTENSION: ICD-10-CM

## 2020-07-13 DIAGNOSIS — I10 HTN, GOAL BELOW 150/90: ICD-10-CM

## 2020-07-13 RX ORDER — LOSARTAN POTASSIUM 100 MG/1
100 TABLET ORAL DAILY
Qty: 90 TABLET | Refills: 1 | Status: SHIPPED | OUTPATIENT
Start: 2020-07-13

## 2020-07-13 NOTE — TELEPHONE ENCOUNTER
[facility] Call Center    Phone Message    May a detailed message be left on voicemail: yes     Reason for Call: Medication Refill Request    Has the patient contacted the pharmacy for the refill? Yes   Name of medication being requested: losartan (COZAAR) 100 MG tablet   Provider who prescribed the medication: Dr Sanderson  [facility] - [place], [place] - [address]   Date medication is needed: ASAP she is out.            Action Taken: Message routed to:  [facility]): Primary    Travel Screening: Not Applicable

## 2020-07-14 RX ORDER — LOSARTAN POTASSIUM 50 MG/1
100 TABLET ORAL DAILY
Qty: 60 TABLET | Refills: 0 | Status: SHIPPED | OUTPATIENT
Start: 2020-07-14 | End: 2020-09-10

## 2020-09-05 DIAGNOSIS — I10 BENIGN ESSENTIAL HYPERTENSION: ICD-10-CM

## 2020-09-10 RX ORDER — LOSARTAN POTASSIUM 50 MG/1
100 TABLET ORAL DAILY
Qty: 60 TABLET | Refills: 2 | Status: SHIPPED | OUTPATIENT
Start: 2020-09-10

## 2020-09-11 NOTE — TELEPHONE ENCOUNTER
LOSARTAN POTASSIUM 50 MG TAB  Take 2 tablets (100 mg) by mouth daily       Last Written Prescription Date:  7/14/20  Last Fill Quantity: 60,   # refills: 0  Last Office Visit : 2/22/20  Future Office visit:  none   RF per protocol  Can refill x 3 months if BP greater than or equal to 140/90, and refer to PCP for follow up.       Blood pressure out of range > noted at last [facility] isit 2/22/20 02/22/20 (!) 145/75   02/13/20 139/80   12/09/19 123/66

## 2021-01-15 ENCOUNTER — HEALTH MAINTENANCE LETTER (OUTPATIENT)
Age: 81
End: 2021-01-15

## 2021-01-31 ENCOUNTER — IMMUNIZATION (OUTPATIENT)
Dept: NURSING | Facility: CLINIC | Age: 81
End: 2021-01-31
Payer: COMMERCIAL

## 2021-01-31 PROCEDURE — 91300 PR COVID VAC PFIZER DIL RECON 30 MCG/0.3 ML IM: CPT

## 2021-01-31 PROCEDURE — 0001A PR COVID VAC PFIZER DIL RECON 30 MCG/0.3 ML IM: CPT

## 2021-02-21 ENCOUNTER — IMMUNIZATION (OUTPATIENT)
Dept: NURSING | Facility: CLINIC | Age: 81
End: 2021-02-21
Attending: INTERNAL MEDICINE
Payer: COMMERCIAL

## 2021-02-21 PROCEDURE — 91300 PR COVID VAC PFIZER DIL RECON 30 MCG/0.3 ML IM: CPT

## 2021-02-21 PROCEDURE — 0002A PR COVID VAC PFIZER DIL RECON 30 MCG/0.3 ML IM: CPT

## 2021-05-09 ENCOUNTER — HEALTH MAINTENANCE LETTER (OUTPATIENT)
Age: 81
End: 2021-05-09

## 2021-10-24 ENCOUNTER — HEALTH MAINTENANCE LETTER (OUTPATIENT)
Age: 81
End: 2021-10-24

## 2022-06-05 ENCOUNTER — HEALTH MAINTENANCE LETTER (OUTPATIENT)
Age: 82
End: 2022-06-05

## 2022-10-16 ENCOUNTER — HEALTH MAINTENANCE LETTER (OUTPATIENT)
Age: 82
End: 2022-10-16

## 2023-06-17 ENCOUNTER — HEALTH MAINTENANCE LETTER (OUTPATIENT)
Age: 83
End: 2023-06-17

## 2024-08-07 ENCOUNTER — MEDICAL CORRESPONDENCE (OUTPATIENT)
Dept: HEALTH INFORMATION MANAGEMENT | Facility: CLINIC | Age: 84
End: 2024-08-07
Payer: COMMERCIAL

## 2025-03-31 NOTE — PROGRESS NOTES
SUBJECTIVE:   Alena Coronel is a 78 year old female who presents to clinic today for the following health issues:    Patient presents with:  RECHECK: Ultrasound Abdomen and CT Scan Abdomen and Pelvis  Health Maintenance: DUE: GAD7, PHQ9, MICROABLUMIN, LIPID, BMP, EYE EXAM, DEXA, ADP,   Hair/Scalp Problem: loss of hair and itchy            Problem list and histories reviewed & adjusted, as indicated.  Additional history: as documented    Patient Active Problem List   Diagnosis     Generalized osteoarthrosis, unspecified site     Irritable bowel syndrome     Adjustment disorder with mixed anxiety and depressed mood     Insomnia     Eczema     Hypertension goal BP (blood pressure) < 140/90     Hyperlipidemia LDL goal <130     Chronic neck pain     Hypokalemia     Osteoporosis     Chest pain     Idiopathic thrombocytopenia (H)     RUQ abdominal pain     Immune to hepatitis A     Dysphagia     GERD (gastroesophageal reflux disease)     Advanced directives, counseling/discussion     Macular drusen     PVD (posterior vitreous detachment)     Venous stasis     Anxiety     Cataract     TMJ (temporomandibular joint syndrome)     Past Surgical History:   Procedure Laterality Date     BIOPSY OF BREAST, INCISIONAL  1985     C APPENDECTOMY  1975     C LIGATE FALLOPIAN TUBE  1975     COLONOSCOPY  11/14/2008       Social History   Substance Use Topics     Smoking status: Never Smoker     Smokeless tobacco: Never Used      Comment: smoke free household.     Alcohol use No      Comment: 1/3 cup of red wine before bedtime     Family History   Problem Relation Age of Onset     GASTROINTESTINAL DISEASE Father      Stomach/Liver Problems     Neurologic Disorder Sister      SANCHEZ     Glaucoma No family hx of      Macular Degeneration No family hx of          Current Outpatient Prescriptions   Medication Sig Dispense Refill     Alendronate Sodium 70 MG TBEF Take by mouth every 7 days       ASMANEX  MCG/ACT inhaler 200 mcg  12      No signs to explain shoulder pain on xray.    "ATORVASTATIN CALCIUM PO Take 20 mg by mouth every morning       Biotin 2500 MCG CAPS Take 5,000 mcg/day by mouth       Calcium Carbonate-Vit D-Min (CALCIUM 1200 PO) Take 1,200 mg by mouth. 1 tab daily       Cholecalciferol (VITAMIN D) 1000 UNITS capsule Take 1 capsule by mouth daily.       [employer] OMEGA-3 KRILL  MG CAPS Take  by mouth.       Flaxseed, Linseed, POWD Take 1-2 tsp by mouth daily.       fluocinonide (LIDEX) 0.05 % ointment Apply topically 2 times daily       TRAZODONE HCL PO Take 100 mg by mouth At Bedtime       Coenzyme Q10 (COQ10) 200 MG CAPS Take 200 mg by mouth daily       Omega-3 Fatty Acids ([employer] NATURAL FISH OIL) 1000 MG CAPS Take 1,400 mg by mouth daily       Allergies   Allergen Reactions     Penicillins Rash     BP Readings from Last 3 Encounters:   09/20/18 126/70   08/21/18 120/70   01/09/14 129/74    Wt Readings from Last 3 Encounters:   09/20/18 132 lb 3.2 oz (60 kg)   08/21/18 133 lb 9.6 oz (60.6 kg)   01/09/14 136 lb 8 oz (61.9 kg)                  Labs reviewed in EPIC    Reviewed and updated as needed this visit by clinical staff       Reviewed and updated as needed this visit by Provider         ROS:  This 78 year old female is here today to discuss her ongoing abdomen discomfort with gassiness and distention. Worse after she eats. Had recent normal abdomen ultrasound and cat scan. She wonders what supplements she should be taking. She worries about her heart since she gets occasional palpitations at night. She brought in fish oil and other supplements that her daughter brought for her.   Patient has tenderness over her scalp and has been losing hair easily. She worries about this. Wonders what she can take to make it stop.   All other review of systems are negative  Personal, family, and social history reviewed with patient and revised.         OBJECTIVE:     /70  Pulse 58  Temp 97.5  F (36.4  C) (Oral)  Resp 18  Ht 4' 10.47\" (1.485 m)  Wt 132 lb 3.2 oz (60 kg)  SpO2 " 98%  BMI 27.19 kg/m2  Body mass index is 27.19 kg/(m^2).  GENERAL: healthy, alert and no distress  NECK: no adenopathy, no asymmetry, masses, or scars and thyroid normal to palpation  Her hair appears normal to me. I do not appreciate much thinning. She is mildly tender over her right parietal area. No bruises seen. No rashes seen on her scalp. No excoriations   RESP: lungs clear to auscultation - no rales, rhonchi or wheezes  CV: regular rate and rhythm, normal S1 S2, no S3 or S4, no murmur, click or rub, no peripheral edema and peripheral pulses strong  ABDOMEN: soft, nontender, no hepatosplenomegaly, no masses and bowel sounds normal. I have no idea why she feels bloated after she eats. Could be a functional bowel problem. We discussed.   MS: no gross musculoskeletal defects noted, no edema  Well hydrated  Well nourished  Well groomed  Alert and oriented X 3  Good spirits  Brisk gait with no shortness of breath     Diagnostic Test Results:  none     ASSESSMENT/PLAN:              1. Hypertension goal BP (blood pressure) < 140/90  Good control   - BASIC METABOLIC PANEL; Future    2. Hyperlipidemia LDL goal <130  Due for lipid screen   - Lipid panel reflex to direct LDL Fasting; Future    3. Asymptomatic postmenopausal status  Due for dexa scan as she is on alendronate for osteoporosis   - DEXA HIP/PELVIS/SPINE - Future; Future    4. Hair loss  As above   - TSH with free T4 reflex; Future    Return to clinic if no improvement     PERLA ASH MD  [facility]